# Patient Record
Sex: FEMALE | Race: WHITE | Employment: UNEMPLOYED | URBAN - METROPOLITAN AREA
[De-identification: names, ages, dates, MRNs, and addresses within clinical notes are randomized per-mention and may not be internally consistent; named-entity substitution may affect disease eponyms.]

---

## 2019-01-06 ENCOUNTER — HOSPITAL ENCOUNTER (EMERGENCY)
Age: 19
Discharge: HOME | End: 2019-01-06
Attending: EMERGENCY MEDICINE

## 2019-01-06 ENCOUNTER — APPOINTMENT (OUTPATIENT)
Dept: CT IMAGING | Age: 19
End: 2019-01-06

## 2019-01-06 VITALS
RESPIRATION RATE: 14 BRPM | BODY MASS INDEX: 32.25 KG/M2 | SYSTOLIC BLOOD PRESSURE: 96 MMHG | OXYGEN SATURATION: 100 % | DIASTOLIC BLOOD PRESSURE: 52 MMHG | HEIGHT: 59 IN | TEMPERATURE: 97 F | WEIGHT: 160 LBS | HEART RATE: 80 BPM

## 2019-01-06 DIAGNOSIS — R10.30 LOWER ABDOMINAL PAIN: Primary | ICD-10-CM

## 2019-01-06 LAB
A/G RATIO: 1.7 (ref 1.1–2.2)
ALBUMIN SERPL-MCNC: 4.4 G/DL (ref 3.4–5)
ALP BLD-CCNC: 66 U/L (ref 40–129)
ALT SERPL-CCNC: 18 U/L (ref 10–40)
ANION GAP SERPL CALCULATED.3IONS-SCNC: 8 MMOL/L (ref 3–16)
AST SERPL-CCNC: 16 U/L (ref 15–37)
BASOPHILS ABSOLUTE: 0.1 K/UL (ref 0–0.2)
BASOPHILS RELATIVE PERCENT: 0.7 %
BILIRUB SERPL-MCNC: 0.5 MG/DL (ref 0–1)
BILIRUBIN URINE: NEGATIVE
BLOOD, URINE: NEGATIVE
BUN BLDV-MCNC: 11 MG/DL (ref 7–20)
CALCIUM SERPL-MCNC: 9.1 MG/DL (ref 8.3–10.6)
CHLORIDE BLD-SCNC: 106 MMOL/L (ref 99–110)
CLARITY: CLEAR
CO2: 22 MMOL/L (ref 21–32)
COLOR: YELLOW
CREAT SERPL-MCNC: 0.6 MG/DL (ref 0.6–1.1)
EOSINOPHILS ABSOLUTE: 0.4 K/UL (ref 0–0.6)
EOSINOPHILS RELATIVE PERCENT: 5.3 %
GFR AFRICAN AMERICAN: >60
GFR NON-AFRICAN AMERICAN: >60
GLOBULIN: 2.6 G/DL
GLUCOSE BLD-MCNC: 97 MG/DL (ref 70–99)
GLUCOSE URINE: NEGATIVE MG/DL
HCG QUALITATIVE: NEGATIVE
HCT VFR BLD CALC: 45.1 % (ref 36–48)
HEMOGLOBIN: 15.6 G/DL (ref 12–16)
KETONES, URINE: NEGATIVE MG/DL
LEUKOCYTE ESTERASE, URINE: NEGATIVE
LYMPHOCYTES ABSOLUTE: 2.7 K/UL (ref 1–5.1)
LYMPHOCYTES RELATIVE PERCENT: 32.9 %
MCH RBC QN AUTO: 30.9 PG (ref 26–34)
MCHC RBC AUTO-ENTMCNC: 34.7 G/DL (ref 31–36)
MCV RBC AUTO: 88.8 FL (ref 80–100)
MICROSCOPIC EXAMINATION: NORMAL
MONOCYTES ABSOLUTE: 0.3 K/UL (ref 0–1.3)
MONOCYTES RELATIVE PERCENT: 4.3 %
NEUTROPHILS ABSOLUTE: 4.6 K/UL (ref 1.7–7.7)
NEUTROPHILS RELATIVE PERCENT: 56.8 %
NITRITE, URINE: NEGATIVE
PDW BLD-RTO: 12.1 % (ref 12.4–15.4)
PH UA: 6.5
PLATELET # BLD: 292 K/UL (ref 135–450)
PMV BLD AUTO: 7.1 FL (ref 5–10.5)
POTASSIUM REFLEX MAGNESIUM: 4.3 MMOL/L (ref 3.5–5.1)
PROTEIN UA: NEGATIVE MG/DL
RBC # BLD: 5.07 M/UL (ref 4–5.2)
SODIUM BLD-SCNC: 136 MMOL/L (ref 136–145)
SPECIFIC GRAVITY UA: 1.01
TOTAL PROTEIN: 7 G/DL (ref 6.4–8.2)
URINE REFLEX TO CULTURE: NORMAL
URINE TYPE: NORMAL
UROBILINOGEN, URINE: 0.2 E.U./DL
WBC # BLD: 8.1 K/UL (ref 4–11)

## 2019-01-06 PROCEDURE — 74177 CT ABD & PELVIS W/CONTRAST: CPT

## 2019-01-06 PROCEDURE — 6360000004 HC RX CONTRAST MEDICATION: Performed by: EMERGENCY MEDICINE

## 2019-01-06 PROCEDURE — 80053 COMPREHEN METABOLIC PANEL: CPT

## 2019-01-06 PROCEDURE — 96374 THER/PROPH/DIAG INJ IV PUSH: CPT

## 2019-01-06 PROCEDURE — 96375 TX/PRO/DX INJ NEW DRUG ADDON: CPT

## 2019-01-06 PROCEDURE — 81003 URINALYSIS AUTO W/O SCOPE: CPT

## 2019-01-06 PROCEDURE — 85025 COMPLETE CBC W/AUTO DIFF WBC: CPT

## 2019-01-06 PROCEDURE — 84703 CHORIONIC GONADOTROPIN ASSAY: CPT

## 2019-01-06 PROCEDURE — 99284 EMERGENCY DEPT VISIT MOD MDM: CPT

## 2019-01-06 PROCEDURE — 6360000002 HC RX W HCPCS: Performed by: EMERGENCY MEDICINE

## 2019-01-06 RX ORDER — ONDANSETRON 4 MG/1
4 TABLET, ORALLY DISINTEGRATING ORAL EVERY 8 HOURS PRN
Qty: 12 TABLET | Refills: 0 | Status: SHIPPED | OUTPATIENT
Start: 2019-01-06 | End: 2019-03-20

## 2019-01-06 RX ORDER — KETOROLAC TROMETHAMINE 30 MG/ML
30 INJECTION, SOLUTION INTRAMUSCULAR; INTRAVENOUS ONCE
Status: COMPLETED | OUTPATIENT
Start: 2019-01-06 | End: 2019-01-06

## 2019-01-06 RX ORDER — ONDANSETRON 2 MG/ML
4 INJECTION INTRAMUSCULAR; INTRAVENOUS
Status: DISCONTINUED | OUTPATIENT
Start: 2019-01-06 | End: 2019-01-06 | Stop reason: HOSPADM

## 2019-01-06 RX ADMIN — IOPAMIDOL 75 ML: 755 INJECTION, SOLUTION INTRAVENOUS at 13:16

## 2019-01-06 RX ADMIN — KETOROLAC TROMETHAMINE 30 MG: 30 INJECTION, SOLUTION INTRAMUSCULAR; INTRAVENOUS at 12:50

## 2019-01-06 RX ADMIN — ONDANSETRON 4 MG: 2 INJECTION INTRAMUSCULAR; INTRAVENOUS at 12:50

## 2019-01-06 ASSESSMENT — PAIN DESCRIPTION - DESCRIPTORS: DESCRIPTORS: ACHING

## 2019-01-06 ASSESSMENT — PAIN SCALES - GENERAL: PAINLEVEL_OUTOF10: 7

## 2019-01-06 ASSESSMENT — PAIN DESCRIPTION - LOCATION: LOCATION: ABDOMEN

## 2019-01-06 ASSESSMENT — PAIN DESCRIPTION - PAIN TYPE: TYPE: ACUTE PAIN

## 2019-03-15 ENCOUNTER — HOSPITAL ENCOUNTER (EMERGENCY)
Age: 19
Discharge: HOME | End: 2019-03-15
Attending: EMERGENCY MEDICINE

## 2019-03-15 VITALS
OXYGEN SATURATION: 98 % | SYSTOLIC BLOOD PRESSURE: 105 MMHG | BODY MASS INDEX: 34.88 KG/M2 | HEIGHT: 59 IN | DIASTOLIC BLOOD PRESSURE: 63 MMHG | RESPIRATION RATE: 16 BRPM | WEIGHT: 173 LBS | HEART RATE: 62 BPM | TEMPERATURE: 98.4 F

## 2019-03-15 DIAGNOSIS — K13.70 LESION OF ORAL MUCOSA: Primary | ICD-10-CM

## 2019-03-15 DIAGNOSIS — R59.1 LYMPHADENOPATHY: ICD-10-CM

## 2019-03-15 LAB
A/G RATIO: 1.1 (ref 1.1–2.2)
ALBUMIN SERPL-MCNC: 4.2 G/DL (ref 3.4–5)
ALP BLD-CCNC: 63 U/L (ref 40–129)
ALT SERPL-CCNC: 17 U/L (ref 10–40)
ANION GAP SERPL CALCULATED.3IONS-SCNC: 13 MMOL/L (ref 3–16)
AST SERPL-CCNC: 17 U/L (ref 15–37)
BASOPHILS ABSOLUTE: 0 K/UL (ref 0–0.2)
BASOPHILS RELATIVE PERCENT: 0.3 %
BILIRUB SERPL-MCNC: 1 MG/DL (ref 0–1)
BILIRUBIN URINE: NEGATIVE
BLOOD, URINE: NEGATIVE
BUN BLDV-MCNC: 9 MG/DL (ref 7–20)
CALCIUM SERPL-MCNC: 9.2 MG/DL (ref 8.3–10.6)
CHLORIDE BLD-SCNC: 96 MMOL/L (ref 99–110)
CLARITY: CLEAR
CO2: 23 MMOL/L (ref 21–32)
COLOR: YELLOW
CREAT SERPL-MCNC: 0.8 MG/DL (ref 0.6–1.1)
EOSINOPHILS ABSOLUTE: 0 K/UL (ref 0–0.6)
EOSINOPHILS RELATIVE PERCENT: 0.1 %
GFR AFRICAN AMERICAN: >60
GFR NON-AFRICAN AMERICAN: >60
GLOBULIN: 3.9 G/DL
GLUCOSE BLD-MCNC: 115 MG/DL (ref 70–99)
GLUCOSE URINE: NEGATIVE MG/DL
HCG QUALITATIVE: NEGATIVE
HCT VFR BLD CALC: 41 % (ref 36–48)
HEMOGLOBIN: 14.5 G/DL (ref 12–16)
KETONES, URINE: NEGATIVE MG/DL
LEUKOCYTE ESTERASE, URINE: NEGATIVE
LYMPHOCYTES ABSOLUTE: 1.2 K/UL (ref 1–5.1)
LYMPHOCYTES RELATIVE PERCENT: 8.9 %
MCH RBC QN AUTO: 30.6 PG (ref 26–34)
MCHC RBC AUTO-ENTMCNC: 35.4 G/DL (ref 31–36)
MCV RBC AUTO: 86.6 FL (ref 80–100)
MICROSCOPIC EXAMINATION: NORMAL
MONO TEST: NEGATIVE
MONOCYTES ABSOLUTE: 1.2 K/UL (ref 0–1.3)
MONOCYTES RELATIVE PERCENT: 9.1 %
NEUTROPHILS ABSOLUTE: 11 K/UL (ref 1.7–7.7)
NEUTROPHILS RELATIVE PERCENT: 81.6 %
NITRITE, URINE: NEGATIVE
PDW BLD-RTO: 11.7 % (ref 12.4–15.4)
PH UA: 5.5 (ref 5–8)
PLATELET # BLD: 227 K/UL (ref 135–450)
PMV BLD AUTO: 6.9 FL (ref 5–10.5)
POTASSIUM SERPL-SCNC: 3.8 MMOL/L (ref 3.5–5.1)
PROTEIN UA: NEGATIVE MG/DL
RBC # BLD: 4.73 M/UL (ref 4–5.2)
S PYO AG THROAT QL: NEGATIVE
SODIUM BLD-SCNC: 132 MMOL/L (ref 136–145)
SPECIFIC GRAVITY UA: 1.01 (ref 1–1.03)
TOTAL PROTEIN: 8.1 G/DL (ref 6.4–8.2)
URINE REFLEX TO CULTURE: NORMAL
URINE TYPE: NORMAL
UROBILINOGEN, URINE: 0.2 E.U./DL
WBC # BLD: 13.5 K/UL (ref 4–11)

## 2019-03-15 PROCEDURE — 2580000003 HC RX 258: Performed by: PHYSICIAN ASSISTANT

## 2019-03-15 PROCEDURE — 99283 EMERGENCY DEPT VISIT LOW MDM: CPT

## 2019-03-15 PROCEDURE — 85025 COMPLETE CBC W/AUTO DIFF WBC: CPT

## 2019-03-15 PROCEDURE — 6360000002 HC RX W HCPCS: Performed by: PHYSICIAN ASSISTANT

## 2019-03-15 PROCEDURE — 96374 THER/PROPH/DIAG INJ IV PUSH: CPT

## 2019-03-15 PROCEDURE — 80053 COMPREHEN METABOLIC PANEL: CPT

## 2019-03-15 PROCEDURE — 87081 CULTURE SCREEN ONLY: CPT

## 2019-03-15 PROCEDURE — 96375 TX/PRO/DX INJ NEW DRUG ADDON: CPT

## 2019-03-15 PROCEDURE — 81003 URINALYSIS AUTO W/O SCOPE: CPT

## 2019-03-15 PROCEDURE — 96361 HYDRATE IV INFUSION ADD-ON: CPT

## 2019-03-15 PROCEDURE — 87880 STREP A ASSAY W/OPTIC: CPT

## 2019-03-15 PROCEDURE — 86308 HETEROPHILE ANTIBODY SCREEN: CPT

## 2019-03-15 PROCEDURE — 84703 CHORIONIC GONADOTROPIN ASSAY: CPT

## 2019-03-15 RX ORDER — KETOROLAC TROMETHAMINE 30 MG/ML
30 INJECTION, SOLUTION INTRAMUSCULAR; INTRAVENOUS ONCE
Status: COMPLETED | OUTPATIENT
Start: 2019-03-15 | End: 2019-03-15

## 2019-03-15 RX ORDER — AMOXICILLIN AND CLAVULANATE POTASSIUM 875; 125 MG/1; MG/1
1 TABLET, FILM COATED ORAL 2 TIMES DAILY
Qty: 20 TABLET | Refills: 0 | Status: SHIPPED | OUTPATIENT
Start: 2019-03-15 | End: 2019-03-25

## 2019-03-15 RX ORDER — DEXAMETHASONE SODIUM PHOSPHATE 10 MG/ML
6 INJECTION INTRAMUSCULAR; INTRAVENOUS EVERY 6 HOURS
Status: DISCONTINUED | OUTPATIENT
Start: 2019-03-15 | End: 2019-03-15 | Stop reason: HOSPADM

## 2019-03-15 RX ORDER — 0.9 % SODIUM CHLORIDE 0.9 %
1000 INTRAVENOUS SOLUTION INTRAVENOUS ONCE
Status: COMPLETED | OUTPATIENT
Start: 2019-03-15 | End: 2019-03-15

## 2019-03-15 RX ADMIN — SODIUM CHLORIDE 1000 ML: 9 INJECTION, SOLUTION INTRAVENOUS at 14:31

## 2019-03-15 RX ADMIN — DEXAMETHASONE SODIUM PHOSPHATE 6 MG: 10 INJECTION INTRAMUSCULAR; INTRAVENOUS at 13:44

## 2019-03-15 RX ADMIN — KETOROLAC TROMETHAMINE 30 MG: 30 INJECTION, SOLUTION INTRAMUSCULAR; INTRAVENOUS at 14:31

## 2019-03-15 RX ADMIN — SODIUM CHLORIDE 1000 ML: 9 INJECTION, SOLUTION INTRAVENOUS at 13:44

## 2019-03-15 ASSESSMENT — ENCOUNTER SYMPTOMS
RESPIRATORY NEGATIVE: 1
GASTROINTESTINAL NEGATIVE: 1
EYES NEGATIVE: 1

## 2019-03-15 ASSESSMENT — PAIN SCALES - GENERAL
PAINLEVEL_OUTOF10: 8
PAINLEVEL_OUTOF10: 8
PAINLEVEL_OUTOF10: 6

## 2019-03-15 ASSESSMENT — PAIN DESCRIPTION - LOCATION
LOCATION: TEETH
LOCATION_2: THROAT

## 2019-03-15 ASSESSMENT — PAIN DESCRIPTION - ORIENTATION: ORIENTATION: LEFT

## 2019-03-15 ASSESSMENT — PAIN DESCRIPTION - INTENSITY: RATING_2: 10

## 2019-03-17 LAB — S PYO THROAT QL CULT: NORMAL

## 2019-03-20 ENCOUNTER — HOSPITAL ENCOUNTER (EMERGENCY)
Age: 19
Discharge: HOME | End: 2019-03-20

## 2019-03-20 VITALS
SYSTOLIC BLOOD PRESSURE: 106 MMHG | DIASTOLIC BLOOD PRESSURE: 78 MMHG | TEMPERATURE: 98.8 F | OXYGEN SATURATION: 98 % | BODY MASS INDEX: 29.84 KG/M2 | HEIGHT: 59 IN | RESPIRATION RATE: 18 BRPM | HEART RATE: 100 BPM | WEIGHT: 148 LBS

## 2019-03-20 DIAGNOSIS — J02.9 ACUTE PHARYNGITIS, UNSPECIFIED ETIOLOGY: Primary | ICD-10-CM

## 2019-03-20 LAB
AMORPHOUS: ABNORMAL /HPF
BACTERIA: ABNORMAL /HPF
BILIRUBIN URINE: ABNORMAL
BLOOD, URINE: ABNORMAL
CLARITY: ABNORMAL
COLOR: YELLOW
EPITHELIAL CELLS, UA: ABNORMAL /HPF
GLUCOSE URINE: NEGATIVE MG/DL
KETONES, URINE: 15 MG/DL
LEUKOCYTE ESTERASE, URINE: ABNORMAL
MICROSCOPIC EXAMINATION: YES
MUCUS: ABNORMAL /LPF
NITRITE, URINE: NEGATIVE
PH UA: 6 (ref 5–8)
PROTEIN UA: NEGATIVE MG/DL
RBC UA: ABNORMAL /HPF (ref 0–2)
S PYO AG THROAT QL: NEGATIVE
SPECIFIC GRAVITY UA: 1.02 (ref 1–1.03)
URINE TYPE: ABNORMAL
UROBILINOGEN, URINE: 0.2 E.U./DL
WBC UA: ABNORMAL /HPF (ref 0–5)

## 2019-03-20 PROCEDURE — 87081 CULTURE SCREEN ONLY: CPT

## 2019-03-20 PROCEDURE — 6370000000 HC RX 637 (ALT 250 FOR IP): Performed by: PHYSICIAN ASSISTANT

## 2019-03-20 PROCEDURE — 81001 URINALYSIS AUTO W/SCOPE: CPT

## 2019-03-20 PROCEDURE — 99284 EMERGENCY DEPT VISIT MOD MDM: CPT

## 2019-03-20 PROCEDURE — 87880 STREP A ASSAY W/OPTIC: CPT

## 2019-03-20 RX ORDER — IBUPROFEN 800 MG/1
800 TABLET ORAL EVERY 8 HOURS PRN
Qty: 20 TABLET | Refills: 0 | Status: SHIPPED | OUTPATIENT
Start: 2019-03-20 | End: 2019-03-28

## 2019-03-20 RX ORDER — IBUPROFEN 800 MG/1
800 TABLET ORAL ONCE
Status: COMPLETED | OUTPATIENT
Start: 2019-03-20 | End: 2019-03-20

## 2019-03-20 RX ORDER — FAMOTIDINE 20 MG/1
20 TABLET, FILM COATED ORAL 2 TIMES DAILY
Qty: 60 TABLET | Refills: 0 | Status: SHIPPED | OUTPATIENT
Start: 2019-03-20 | End: 2019-03-28

## 2019-03-20 RX ADMIN — IBUPROFEN 800 MG: 800 TABLET, FILM COATED ORAL at 09:11

## 2019-03-20 RX ADMIN — Medication 5 ML: at 09:13

## 2019-03-20 ASSESSMENT — PAIN DESCRIPTION - LOCATION: LOCATION: THROAT

## 2019-03-20 ASSESSMENT — PAIN SCALES - GENERAL
PAINLEVEL_OUTOF10: 6
PAINLEVEL_OUTOF10: 6

## 2019-03-20 ASSESSMENT — PAIN DESCRIPTION - DESCRIPTORS: DESCRIPTORS: ACHING;BURNING

## 2019-03-22 LAB — S PYO THROAT QL CULT: NORMAL

## 2019-03-28 ENCOUNTER — HOSPITAL ENCOUNTER (EMERGENCY)
Age: 19
Discharge: HOME | End: 2019-03-28
Attending: EMERGENCY MEDICINE

## 2019-03-28 VITALS
TEMPERATURE: 97.9 F | HEART RATE: 88 BPM | HEIGHT: 59 IN | DIASTOLIC BLOOD PRESSURE: 66 MMHG | SYSTOLIC BLOOD PRESSURE: 102 MMHG | OXYGEN SATURATION: 100 % | RESPIRATION RATE: 16 BRPM | BODY MASS INDEX: 29.23 KG/M2 | WEIGHT: 145 LBS

## 2019-03-28 DIAGNOSIS — R82.4 KETONURIA: ICD-10-CM

## 2019-03-28 DIAGNOSIS — N10 ACUTE PYELONEPHRITIS: ICD-10-CM

## 2019-03-28 DIAGNOSIS — R11.2 NAUSEA AND VOMITING, INTRACTABILITY OF VOMITING NOT SPECIFIED, UNSPECIFIED VOMITING TYPE: Primary | ICD-10-CM

## 2019-03-28 LAB
A/G RATIO: 1.2 (ref 1.1–2.2)
ALBUMIN SERPL-MCNC: 4.6 G/DL (ref 3.4–5)
ALP BLD-CCNC: 86 U/L (ref 40–129)
ALT SERPL-CCNC: 27 U/L (ref 10–40)
AMPHETAMINE SCREEN, URINE: ABNORMAL
ANION GAP SERPL CALCULATED.3IONS-SCNC: 14 MMOL/L (ref 3–16)
AST SERPL-CCNC: 22 U/L (ref 15–37)
BACTERIA: ABNORMAL /HPF
BARBITURATE SCREEN URINE: ABNORMAL
BASOPHILS ABSOLUTE: 0.1 K/UL (ref 0–0.2)
BASOPHILS RELATIVE PERCENT: 0.9 %
BENZODIAZEPINE SCREEN, URINE: ABNORMAL
BILIRUB SERPL-MCNC: 0.9 MG/DL (ref 0–1)
BILIRUBIN URINE: ABNORMAL
BLOOD, URINE: NEGATIVE
BUN BLDV-MCNC: 11 MG/DL (ref 7–20)
CALCIUM SERPL-MCNC: 9.9 MG/DL (ref 8.3–10.6)
CANNABINOID SCREEN URINE: POSITIVE
CHLORIDE BLD-SCNC: 102 MMOL/L (ref 99–110)
CLARITY: CLEAR
CO2: 24 MMOL/L (ref 21–32)
COCAINE METABOLITE SCREEN URINE: ABNORMAL
COLOR: YELLOW
CREAT SERPL-MCNC: 0.7 MG/DL (ref 0.6–1.1)
EOSINOPHILS ABSOLUTE: 0.2 K/UL (ref 0–0.6)
EOSINOPHILS RELATIVE PERCENT: 1.8 %
EPITHELIAL CELLS, UA: ABNORMAL /HPF
GFR AFRICAN AMERICAN: >60
GFR NON-AFRICAN AMERICAN: >60
GLOBULIN: 3.9 G/DL
GLUCOSE BLD-MCNC: 96 MG/DL (ref 70–99)
GLUCOSE URINE: NEGATIVE MG/DL
HCG(URINE) PREGNANCY TEST: NEGATIVE
HCT VFR BLD CALC: 44 % (ref 36–48)
HEMOGLOBIN: 15.1 G/DL (ref 12–16)
KETONES, URINE: ABNORMAL MG/DL
LEUKOCYTE ESTERASE, URINE: ABNORMAL
LIPASE: 21 U/L (ref 13–60)
LYMPHOCYTES ABSOLUTE: 2.2 K/UL (ref 1–5.1)
LYMPHOCYTES RELATIVE PERCENT: 22.4 %
Lab: ABNORMAL
MCH RBC QN AUTO: 30.1 PG (ref 26–34)
MCHC RBC AUTO-ENTMCNC: 34.3 G/DL (ref 31–36)
MCV RBC AUTO: 87.6 FL (ref 80–100)
METHADONE SCREEN, URINE: ABNORMAL
MICROSCOPIC EXAMINATION: YES
MONOCYTES ABSOLUTE: 0.7 K/UL (ref 0–1.3)
MONOCYTES RELATIVE PERCENT: 6.8 %
MUCUS: ABNORMAL /LPF
NEUTROPHILS ABSOLUTE: 6.6 K/UL (ref 1.7–7.7)
NEUTROPHILS RELATIVE PERCENT: 68.1 %
NITRITE, URINE: NEGATIVE
OPIATE SCREEN URINE: ABNORMAL
OXYCODONE URINE: ABNORMAL
PDW BLD-RTO: 11.9 % (ref 12.4–15.4)
PH UA: 5.5
PH UA: 5.5 (ref 5–8)
PHENCYCLIDINE SCREEN URINE: ABNORMAL
PLATELET # BLD: 373 K/UL (ref 135–450)
PMV BLD AUTO: 6.9 FL (ref 5–10.5)
POTASSIUM REFLEX MAGNESIUM: 4.1 MMOL/L (ref 3.5–5.1)
PROPOXYPHENE SCREEN: ABNORMAL
PROTEIN UA: ABNORMAL MG/DL
RAPID INFLUENZA  B AGN: NEGATIVE
RAPID INFLUENZA A AGN: NEGATIVE
RBC # BLD: 5.02 M/UL (ref 4–5.2)
RBC UA: ABNORMAL /HPF (ref 0–2)
SODIUM BLD-SCNC: 140 MMOL/L (ref 136–145)
SPECIFIC GRAVITY UA: 1.02 (ref 1–1.03)
TOTAL PROTEIN: 8.5 G/DL (ref 6.4–8.2)
URINE REFLEX TO CULTURE: YES
URINE TYPE: ABNORMAL
UROBILINOGEN, URINE: 0.2 E.U./DL
WBC # BLD: 9.7 K/UL (ref 4–11)
WBC UA: ABNORMAL /HPF (ref 0–5)

## 2019-03-28 PROCEDURE — 2580000003 HC RX 258: Performed by: PHYSICIAN ASSISTANT

## 2019-03-28 PROCEDURE — 84703 CHORIONIC GONADOTROPIN ASSAY: CPT

## 2019-03-28 PROCEDURE — 81001 URINALYSIS AUTO W/SCOPE: CPT

## 2019-03-28 PROCEDURE — 80053 COMPREHEN METABOLIC PANEL: CPT

## 2019-03-28 PROCEDURE — 85025 COMPLETE CBC W/AUTO DIFF WBC: CPT

## 2019-03-28 PROCEDURE — 6360000002 HC RX W HCPCS: Performed by: PHYSICIAN ASSISTANT

## 2019-03-28 PROCEDURE — 80307 DRUG TEST PRSMV CHEM ANLYZR: CPT

## 2019-03-28 PROCEDURE — 2500000003 HC RX 250 WO HCPCS: Performed by: PHYSICIAN ASSISTANT

## 2019-03-28 PROCEDURE — 99284 EMERGENCY DEPT VISIT MOD MDM: CPT

## 2019-03-28 PROCEDURE — 96374 THER/PROPH/DIAG INJ IV PUSH: CPT

## 2019-03-28 PROCEDURE — 83690 ASSAY OF LIPASE: CPT

## 2019-03-28 PROCEDURE — 6370000000 HC RX 637 (ALT 250 FOR IP): Performed by: EMERGENCY MEDICINE

## 2019-03-28 PROCEDURE — 96375 TX/PRO/DX INJ NEW DRUG ADDON: CPT

## 2019-03-28 PROCEDURE — 87804 INFLUENZA ASSAY W/OPTIC: CPT

## 2019-03-28 PROCEDURE — 96361 HYDRATE IV INFUSION ADD-ON: CPT

## 2019-03-28 PROCEDURE — 87086 URINE CULTURE/COLONY COUNT: CPT

## 2019-03-28 RX ORDER — 0.9 % SODIUM CHLORIDE 0.9 %
1000 INTRAVENOUS SOLUTION INTRAVENOUS ONCE
Status: COMPLETED | OUTPATIENT
Start: 2019-03-28 | End: 2019-03-28

## 2019-03-28 RX ORDER — CEPHALEXIN 500 MG/1
500 CAPSULE ORAL ONCE
Status: COMPLETED | OUTPATIENT
Start: 2019-03-28 | End: 2019-03-28

## 2019-03-28 RX ORDER — FAMOTIDINE 20 MG/1
20 TABLET, FILM COATED ORAL 2 TIMES DAILY
Qty: 60 TABLET | Refills: 0 | Status: SHIPPED | OUTPATIENT
Start: 2019-03-28 | End: 2019-07-10

## 2019-03-28 RX ORDER — ONDANSETRON 2 MG/ML
4 INJECTION INTRAMUSCULAR; INTRAVENOUS ONCE
Status: COMPLETED | OUTPATIENT
Start: 2019-03-28 | End: 2019-03-28

## 2019-03-28 RX ORDER — CEPHALEXIN 500 MG/1
500 CAPSULE ORAL 4 TIMES DAILY
Qty: 56 CAPSULE | Refills: 0 | Status: SHIPPED | OUTPATIENT
Start: 2019-03-28 | End: 2019-04-11

## 2019-03-28 RX ORDER — NAPROXEN 500 MG/1
500 TABLET ORAL 2 TIMES DAILY
Qty: 20 TABLET | Refills: 0 | Status: SHIPPED | OUTPATIENT
Start: 2019-03-28 | End: 2019-09-03 | Stop reason: ALTCHOICE

## 2019-03-28 RX ORDER — ONDANSETRON 4 MG/1
4 TABLET, ORALLY DISINTEGRATING ORAL EVERY 8 HOURS PRN
Qty: 20 TABLET | Refills: 0 | Status: SHIPPED | OUTPATIENT
Start: 2019-03-28 | End: 2019-07-10

## 2019-03-28 RX ORDER — KETOROLAC TROMETHAMINE 30 MG/ML
30 INJECTION, SOLUTION INTRAMUSCULAR; INTRAVENOUS ONCE
Status: COMPLETED | OUTPATIENT
Start: 2019-03-28 | End: 2019-03-28

## 2019-03-28 RX ADMIN — CEPHALEXIN 500 MG: 500 CAPSULE ORAL at 16:33

## 2019-03-28 RX ADMIN — KETOROLAC TROMETHAMINE 30 MG: 30 INJECTION, SOLUTION INTRAMUSCULAR; INTRAVENOUS at 14:09

## 2019-03-28 RX ADMIN — FAMOTIDINE 20 MG: 10 INJECTION, SOLUTION INTRAVENOUS at 14:10

## 2019-03-28 RX ADMIN — SODIUM CHLORIDE 1000 ML: 9 INJECTION, SOLUTION INTRAVENOUS at 15:19

## 2019-03-28 RX ADMIN — SODIUM CHLORIDE 1000 ML: 9 INJECTION, SOLUTION INTRAVENOUS at 14:07

## 2019-03-28 RX ADMIN — ONDANSETRON 4 MG: 2 INJECTION, SOLUTION INTRAMUSCULAR; INTRAVENOUS at 14:08

## 2019-03-28 ASSESSMENT — PAIN DESCRIPTION - LOCATION: LOCATION: ABDOMEN

## 2019-03-28 ASSESSMENT — PAIN SCALES - GENERAL
PAINLEVEL_OUTOF10: 6
PAINLEVEL_OUTOF10: 0

## 2019-03-28 ASSESSMENT — PAIN DESCRIPTION - PAIN TYPE: TYPE: ACUTE PAIN

## 2019-03-28 NOTE — ED PROVIDER NOTES
Department of Emergency Medicine   ED  Provider Note  Admit Date/Time: 3/28/2019  1:09 PM  ED Bed: 12/12  Chief Complaint:       Abdominal Pain (Abdominal pain, nausea, vomiting, diarrhea, hot flashes, dizziness x1 week. )    History of Present Illness   Source of history provided by:  patient. History/Exam Limitations: none. Isha Cat is a 19 y.o. old female who has a past medical history of:   Past Medical History:   Diagnosis Date    ADHD (attention deficit hyperactivity disorder)     Asthma     presents to the emergency department by private vehicle, for complaints of gradual onset aching, cramping pain in the entire abdomen without radiation which began 7 day(s) prior to arrival.   There has been similar episodes in the past.  Since onset the symptoms have been persistent and worsening. The pain is associated with diarrhea, vomiting and nothing pertinent. The pain is aggravated by activity and relieved by nothing. There has been NO CP, dysuria fevers or SOB. ROS   Pertinent positives and negatives are stated within HPI, all other systems reviewed and are negative. Past Surgical History:   Procedure Laterality Date    TUMOR REMOVAL  03/18/2019    left side of throat    Social History:  reports that she has been smoking cigarettes. She has been smoking about 0.25 packs per day. She has never used smokeless tobacco. She reports that she has current or past drug history. Drug: Marijuana. She reports that she does not drink alcohol. Family History: family history is not on file. Allergies: Patient has no known allergies. Physical Exam            ED Triage Vitals [03/28/19 1306]   BP Temp Temp Source Pulse Resp SpO2 Height Weight   111/78 97.9 °F (36.6 °C) Oral 98 15 98 % 4' 11\" (1.499 m) 145 lb (65.8 kg)      Oxygen Saturation Interpretation: Normal.    · General Appearance/Constitutional:  Alert, development consistent with age  · HEENT:  NC/NT. PERRLA.   Airway patent. · Neck:  Supple. No lymphadenopathy. · Respiratory:  No retractions. Lungs Clear to auscultation and breath sounds equal.  · CV:  Regular rate and rhythm. · GI:  General Appearance: normal.         Bowel sounds: normal bowel sounds. Distension:  None. Tenderness: No abdominal tenderness. Liver: non-tender. Spleen:  non-tender. Pulsatile Mass: absent. Hernia:  no inguinal or femoral hernias noted. · Back: CVA Tenderness: No.  · Integument:  Normal turgor. Warm, dry, without visible rash, unless noted elsewhere. · Lymphatics: No edema, cap.refill <3sec. · Neurological:  Orientation age-appropriate. Motor functions intact. Lab / Imaging Results   (All laboratory and radiology results have been personally reviewed by myself)  Labs:  Results for orders placed or performed during the hospital encounter of 03/28/19   Urine Culture   Result Value Ref Range    Urine Culture, Routine       <10,000 CFU/ml mixed skin/urogenital rebeca.  No further workup   Rapid influenza A/B antigens   Result Value Ref Range    Rapid Influenza A Ag Negative Negative    Rapid Influenza B Ag Negative Negative   Urine, reflex to culture   Result Value Ref Range    Color, UA Yellow Straw/Yellow    Clarity, UA Clear Clear    Glucose, Ur Negative Negative mg/dL    Bilirubin Urine SMALL (A) Negative    Ketones, Urine TRACE (A) Negative mg/dL    Specific Gravity, UA 1.025 1.005 - 1.030    Blood, Urine Negative Negative    pH, UA 5.5 5.0 - 8.0    Protein, UA TRACE (A) Negative mg/dL    Urobilinogen, Urine 0.2 <2.0 E.U./dL    Nitrite, Urine Negative Negative    Leukocyte Esterase, Urine TRACE (A) Negative    Microscopic Examination YES     Urine Reflex to Culture Yes     Urine Type Not Specified    Pregnancy, Urine   Result Value Ref Range    HCG(Urine) Pregnancy Test Negative Detects HCG level >20 MIU/mL   CBC Auto Differential   Result Value Ref Range    WBC 9.7 4.0 - 11.0 K/uL    RBC 5.02 4.00 - 5.20 M/uL    Hemoglobin 15.1 12.0 - 16.0 g/dL    Hematocrit 44.0 36.0 - 48.0 %    MCV 87.6 80.0 - 100.0 fL    MCH 30.1 26.0 - 34.0 pg    MCHC 34.3 31.0 - 36.0 g/dL    RDW 11.9 (L) 12.4 - 15.4 %    Platelets 624 477 - 331 K/uL    MPV 6.9 5.0 - 10.5 fL    Neutrophils % 68.1 %    Lymphocytes % 22.4 %    Monocytes % 6.8 %    Eosinophils % 1.8 %    Basophils % 0.9 %    Neutrophils # 6.6 1.7 - 7.7 K/uL    Lymphocytes # 2.2 1.0 - 5.1 K/uL    Monocytes # 0.7 0.0 - 1.3 K/uL    Eosinophils # 0.2 0.0 - 0.6 K/uL    Basophils # 0.1 0.0 - 0.2 K/uL   Comprehensive Metabolic Panel w/ Reflex to MG   Result Value Ref Range    Sodium 140 136 - 145 mmol/L    Potassium reflex Magnesium 4.1 3.5 - 5.1 mmol/L    Chloride 102 99 - 110 mmol/L    CO2 24 21 - 32 mmol/L    Anion Gap 14 3 - 16    Glucose 96 70 - 99 mg/dL    BUN 11 7 - 20 mg/dL    CREATININE 0.7 0.6 - 1.1 mg/dL    GFR Non-African American >60 >60    GFR African American >60 >60    Calcium 9.9 8.3 - 10.6 mg/dL    Total Protein 8.5 (H) 6.4 - 8.2 g/dL    Alb 4.6 3.4 - 5.0 g/dL    Albumin/Globulin Ratio 1.2 1.1 - 2.2    Total Bilirubin 0.9 0.0 - 1.0 mg/dL    Alkaline Phosphatase 86 40 - 129 U/L    ALT 27 10 - 40 U/L    AST 22 15 - 37 U/L    Globulin 3.9 g/dL   Lipase   Result Value Ref Range    Lipase 21.0 13.0 - 60.0 U/L   Drug screen multi urine   Result Value Ref Range    Amphetamine Screen, Urine Neg Negative <1000ng/mL    Barbiturate Screen, Ur Neg Negative <200 ng/mL    Benzodiazepine Screen, Urine Neg Negative <200 ng/mL    Cannabinoid Scrn, Ur POSITIVE (A) Negative <50 ng/mL    Cocaine Metabolite Screen, Urine Neg Negative <300 ng/mL    Opiate Scrn, Ur Neg Negative <300 ng/mL    PCP Screen, Urine Neg Negative <25 ng/mL    Methadone Screen, Urine Neg Negative <300 ng/mL    Propoxyphene Scrn, Ur Neg Negative <300 ng/mL    pH, UA 5.5     Drug Screen Comment: see below     Oxycodone Urine Neg Negative <100 ng/ml   Microscopic Urinalysis   Result Value Ref Range    Mucus, UA 3+ (A) /LPF    WBC, UA 20-50 (A) 0 - 5 /HPF    RBC, UA 10-20 (A) 0 - 2 /HPF    Epi Cells 5-10 /HPF    Bacteria, UA 2+ (A) /HPF     Imaging: All Radiology results interpreted by Radiologist unless otherwise noted. No orders to display       ED Course / Medical Decision Making     Medications   famotidine (PEPCID) injection 20 mg (20 mg Intravenous Given 3/28/19 1410)   ondansetron (ZOFRAN) injection 4 mg (4 mg Intravenous Given 3/28/19 1408)   ketorolac (TORADOL) injection 30 mg (30 mg Intravenous Given 3/28/19 1409)   0.9 % sodium chloride bolus (0 mLs Intravenous Stopped 3/28/19 1519)   0.9 % sodium chloride bolus (0 mLs Intravenous Stopped 3/28/19 1652)   cephALEXin (KEFLEX) capsule 500 mg (500 mg Oral Given 3/28/19 1633)     ED Course as of Apr 01 0108   Thu Mar 28, 2019   1558 Patient is provided with IV fluids Pepcid Toradol and Zofran. She has no signs of leukocytosis. Negative influenza no anemia. No electrolyte abnormality normal kidney and liver function elevation in lipase.    [AR]   1559 Patient indicates her symptoms are improving due to ketonuria we will administer one additional bag of fluids and then we will discharge in stable condition.    [AR]   1559 Patient's blood pressures are mildly hypertensive for the duration of the time she is here. I did review past visits and this is consistent with patients baseline as patient states it is.     [AR]   1624 Rapid influenza A/B antigens:    Rapid Influenza A Ag Negative   Rapid Influenza B Ag Negative [WL]   1624 Lipase:    Lipase 21.0 [WL]   1624 Comprehensive Metabolic Panel w/ Reflex to MG(!):    Sodium 140   Potassium 4.1   Chloride 102   CO2 24   Anion Gap 14   Glucose 96   BUN 11   Creatinine 0.7   GFR Non- >60   GFR African American >60   Calcium 9.9   Total Protein 8.5(!)   Albumin 4.6   Albumin/Globulin Ratio 1.2   Bilirubin 0.9   Alk Phos 86   ALT 27   AST 22   Globulin 3.9 [WL]   1624 CBC Auto Differential(!):    WBC 9.7   RBC 5.02   Hemoglobin Quant 15.1   Hematocrit 44.0   MCV 87.6   MCH 30.1   MCHC 34.3   RDW 11.9(!)   Platelet Count 640   MPV 6.9   Neutrophils % 68.1   Lymphocyte % 22.4   Monocytes % 6.8   Eosinophils % 1.8   Basophils % 0.9   Neutrophils # 6.6   Lymphocytes # 2.2   Monocytes # 0.7   Eosinophils # 0.2   Basophils # 0.1 [WL]   1624 Drug screen multi urine(!):    Amphetamine Screen, Urine Neg   Barbiturate Screen, Ur Neg   Benzodiazepine Screen, Urine Neg   Cannabinoid Scrn, Ur POSITIVE(!)   Cocaine Metabolite Screen, Urine Neg   Opiate Scrn, Ur Neg   PCP Screen, Urine Neg   Methadone Screen, Urine Neg   Propoxyphene Scrn, Ur Neg   pH, UA 5.5   Drug Screen Comment: see below   Oxycodone Urine Neg [WL]   1624 Microscopic Urinalysis(!):    Mucus, UA 3+(!)   WBC, UA 20-50(!)   RBC, UA 10-20(!)   Epi Cells 5-10   Bacteria, UA 2+(!) [WL]   1624 Urine, reflex to culture(!):    Color, UA Yellow   Clarity, UA Clear   Glucose, UA Negative   Bilirubin, Urine SMALL(!)   Ketones, Urine TRACE(!)   Specific Gravity, UA 1.025   Blood, Urine Negative   pH, UA 5.5   Protein, UA TRACE(!)   Urobilinogen, Urine 0.2   Nitrite, Urine Negative   Leukocyte Esterase, Urine TRACE(!)   Microscopic Examination YES   Urine Reflex to Culture Yes   Urine Type Not Specified [WL]   5492 Pregnancy, Urine:    HCG(Urine) Pregnancy Test Negative [WL]   1627 I have personally performed and/or participated in the history, exam and medical decision making and agree with all pertinent clinical information. I have also reviewed and agree with the past medical, family and social history unless otherwise noted. Patient presents for evaluation of generalized abdominal discomfort and intractable nausea and vomiting this morning. She has not had fever chills chest pain or shortness of breath. She has been having some GI symptoms past few days. She has not had any back or flank pain. He has not had significant dysuria.     On my evaluation patient is nontoxic in no acute distress after medications. Abdomen is soft and nontender with no guarding or rebound. She has no CVA tenderness. Patient with nonseptic pyelonephritis, is well-appearing. She'll be discharged with p.o. antibiotics and nausea control    [WL]      ED Course User Index  [AR] Ralph Castillo PA-C  [WL] Ramiro Haley,      Re-Evaluations:  3/28/19      Time: 1430    Patients symptoms are improving. MDM:  Afebrile, stable, patient presents to the ED for evaluation. Seen on my own, per my scope of practice, with attending ED provider available for consultation who agrees with assessment and plan. Afebrile stable patient presents the ED for evaluation of her vitals are within normal limits SPO2 on room air. He percent patient is not hypoxic. Patient's abdomen is nontender upon palpation. She is provided with Pepcid Zofran and Toradol and IV fluids which helped her symptoms improved. Labs and urinalysis are ordered and evaluated. Patient's urine shows ketonuria and therefore she is given 2 L of IV fluids. There is no indication for CT scanning is patient's abdomen is soft and nontender she is not guarding. Symptoms have been ongoing for the past 7 days. Patient is strongly encouraged to follow-up PCP she is given a referral for one 2 discharged home with some treatment and low suspicion for return. All questions are answered. Indications for return to the ED are discussed. Patient is advised if any new or worsening symptoms arise they should immediately return to the emergency room. Follow-up with primary care in 1-2 days. Counseling:   I have spoken with the patient and discussed todays results, in addition to providing specific details for the plan of care and counseling regarding the diagnosis and prognosis and are agreeable with the plan. Assessment      1.  Nausea and vomiting, intractability of vomiting not specified, unspecified vomiting type    2. Ketonuria    3. Acute pyelonephritis      This patient's ED course included: a personal history and physicial examination  This patient has remained hemodynamically stable during their ED course. Plan   Discharge to home. Patient condition is stable. New Medications     Discharge Medication List as of 3/28/2019  4:26 PM      START taking these medications    Details   ondansetron (ZOFRAN ODT) 4 MG disintegrating tablet Take 1 tablet by mouth every 8 hours as needed for Nausea, Disp-20 tablet, R-0Print      cephALEXin (KEFLEX) 500 MG capsule Take 1 capsule by mouth 4 times daily for 14 days, Disp-56 capsule, R-0Print           Electronically signed by Alan Macias PA-C   DD: 3/28/19  **This report was transcribed using voice recognition software. Every effort was made to ensure accuracy; however, inadvertent computerized transcription errors may be present.   END OF PROVIDER NOTE      Alan Macias PA-C  04/01/19 [id number]

## 2019-03-29 LAB — URINE CULTURE, ROUTINE: NORMAL

## 2019-03-29 NOTE — ED PROVIDER NOTES
ED Course as of Mar 28 2324   Thu Mar 28, 2019   1558 Patient is provided with IV fluids Pepcid Toradol and Zofran. She has no signs of leukocytosis. Negative influenza no anemia. No electrolyte abnormality normal kidney and liver function elevation in lipase.    [AR]   1559 Patient indicates her symptoms are improving due to ketonuria we will administer one additional bag of fluids and then we will discharge in stable condition.    [AR]   1559 Patient's blood pressures are mildly hypertensive for the duration of the time she is here. I did review past visits and this is consistent with patients baseline as patient states it is.     [AR]   1986 Rapid influenza A/B antigens:    Rapid Influenza A Ag Negative   Rapid Influenza B Ag Negative [WL]   1624 Lipase:    Lipase 21.0 [WL]   1624 Comprehensive Metabolic Panel w/ Reflex to MG(!):    Sodium 140   Potassium 4.1   Chloride 102   CO2 24   Anion Gap 14   Glucose 96   BUN 11   Creatinine 0.7   GFR Non- >60   GFR African American >60   Calcium 9.9   Total Protein 8.5(!)   Albumin 4.6   Albumin/Globulin Ratio 1.2   Bilirubin 0.9   Alk Phos 86   ALT 27   AST 22   Globulin 3.9 [WL]   1624 CBC Auto Differential(!):    WBC 9.7   RBC 5.02   Hemoglobin Quant 15.1   Hematocrit 44.0   MCV 87.6   MCH 30.1   MCHC 34.3   RDW 11.9(!)   Platelet Count 074   MPV 6.9   Neutrophils % 68.1   Lymphocyte % 22.4   Monocytes % 6.8   Eosinophils % 1.8   Basophils % 0.9   Neutrophils # 6.6   Lymphocytes # 2.2   Monocytes # 0.7   Eosinophils # 0.2   Basophils # 0.1 [WL]   1624 Drug screen multi urine(!):    Amphetamine Screen, Urine Neg   Barbiturate Screen, Ur Neg   Benzodiazepine Screen, Urine Neg   Cannabinoid Scrn, Ur POSITIVE(!)   Cocaine Metabolite Screen, Urine Neg   Opiate Scrn, Ur Neg   PCP Screen, Urine Neg   Methadone Screen, Urine Neg   Propoxyphene Scrn, Ur Neg   pH, UA 5.5   Drug Screen Comment: see below   Oxycodone Urine Neg [WL]   1624 Microscopic Urinalysis(!):    Mucus, UA 3+(!)   WBC, UA 20-50(!)   RBC, UA 10-20(!)   Epi Cells 5-10   Bacteria, UA 2+(!) [WL]   1624 Urine, reflex to culture(!):    Color, UA Yellow   Clarity, UA Clear   Glucose, UA Negative   Bilirubin, Urine SMALL(!)   Ketones, Urine TRACE(!)   Specific Gravity, UA 1.025   Blood, Urine Negative   pH, UA 5.5   Protein, UA TRACE(!)   Urobilinogen, Urine 0.2   Nitrite, Urine Negative   Leukocyte Esterase, Urine TRACE(!)   Microscopic Examination YES   Urine Reflex to Culture Yes   Urine Type Not Specified [WL]   7677 Pregnancy, Urine:    HCG(Urine) Pregnancy Test Negative [WL]   1627 I have personally performed and/or participated in the history, exam and medical decision making and agree with all pertinent clinical information. I have also reviewed and agree with the past medical, family and social history unless otherwise noted. Patient presents for evaluation of generalized abdominal discomfort and intractable nausea and vomiting this morning. She has not had fever chills chest pain or shortness of breath. She has been having some GI symptoms past few days. She has not had any back or flank pain. He has not had significant dysuria. On my evaluation patient is nontoxic in no acute distress after medications. Abdomen is soft and nontender with no guarding or rebound. She has no CVA tenderness. Patient with nonseptic pyelonephritis, is well-appearing.   She'll be discharged with p.o. antibiotics and nausea control    [WL]      ED Course User Index  [AR] Nubia Ferreira PA-C  [WL] DO Lianet Carrizales DO  03/28/19 5093

## 2019-03-31 ENCOUNTER — APPOINTMENT (OUTPATIENT)
Dept: CT IMAGING | Age: 19
End: 2019-03-31

## 2019-03-31 ENCOUNTER — HOSPITAL ENCOUNTER (EMERGENCY)
Age: 19
Discharge: HOME | End: 2019-03-31
Attending: EMERGENCY MEDICINE

## 2019-03-31 VITALS
WEIGHT: 130 LBS | SYSTOLIC BLOOD PRESSURE: 94 MMHG | RESPIRATION RATE: 16 BRPM | BODY MASS INDEX: 26.21 KG/M2 | HEART RATE: 81 BPM | DIASTOLIC BLOOD PRESSURE: 66 MMHG | HEIGHT: 59 IN | TEMPERATURE: 97.8 F | OXYGEN SATURATION: 100 %

## 2019-03-31 DIAGNOSIS — R10.9 RIGHT SIDED ABDOMINAL PAIN: Primary | ICD-10-CM

## 2019-03-31 LAB
A/G RATIO: 1.2 (ref 1.1–2.2)
ALBUMIN SERPL-MCNC: 4.3 G/DL (ref 3.4–5)
ALP BLD-CCNC: 90 U/L (ref 40–129)
ALT SERPL-CCNC: 23 U/L (ref 10–40)
ANION GAP SERPL CALCULATED.3IONS-SCNC: 13 MMOL/L (ref 3–16)
AST SERPL-CCNC: 13 U/L (ref 15–37)
BACTERIA: ABNORMAL /HPF
BASOPHILS ABSOLUTE: 0.1 K/UL (ref 0–0.2)
BASOPHILS RELATIVE PERCENT: 0.7 %
BILIRUB SERPL-MCNC: 1.4 MG/DL (ref 0–1)
BILIRUBIN URINE: ABNORMAL
BLOOD, URINE: ABNORMAL
BUN BLDV-MCNC: 7 MG/DL (ref 7–20)
CALCIUM SERPL-MCNC: 9.2 MG/DL (ref 8.3–10.6)
CHLORIDE BLD-SCNC: 104 MMOL/L (ref 99–110)
CLARITY: ABNORMAL
CO2: 23 MMOL/L (ref 21–32)
COLOR: YELLOW
CREAT SERPL-MCNC: 0.7 MG/DL (ref 0.6–1.1)
EOSINOPHILS ABSOLUTE: 0.1 K/UL (ref 0–0.6)
EOSINOPHILS RELATIVE PERCENT: 0.9 %
EPITHELIAL CELLS, UA: ABNORMAL /HPF
GFR AFRICAN AMERICAN: >60
GFR NON-AFRICAN AMERICAN: >60
GLOBULIN: 3.5 G/DL
GLUCOSE BLD-MCNC: 93 MG/DL (ref 70–99)
GLUCOSE URINE: NEGATIVE MG/DL
HCG QUALITATIVE: NEGATIVE
HCT VFR BLD CALC: 41.3 % (ref 36–48)
HEMOGLOBIN: 14.3 G/DL (ref 12–16)
KETONES, URINE: 15 MG/DL
LEUKOCYTE ESTERASE, URINE: ABNORMAL
LIPASE: 17 U/L (ref 13–60)
LYMPHOCYTES ABSOLUTE: 1.3 K/UL (ref 1–5.1)
LYMPHOCYTES RELATIVE PERCENT: 11.2 %
MCH RBC QN AUTO: 30.1 PG (ref 26–34)
MCHC RBC AUTO-ENTMCNC: 34.6 G/DL (ref 31–36)
MCV RBC AUTO: 87 FL (ref 80–100)
MICROSCOPIC EXAMINATION: YES
MONOCYTES ABSOLUTE: 0.6 K/UL (ref 0–1.3)
MONOCYTES RELATIVE PERCENT: 5.1 %
MUCUS: ABNORMAL /LPF
NEUTROPHILS ABSOLUTE: 9.8 K/UL (ref 1.7–7.7)
NEUTROPHILS RELATIVE PERCENT: 82.1 %
NITRITE, URINE: NEGATIVE
PDW BLD-RTO: 12.3 % (ref 12.4–15.4)
PH UA: 5.5 (ref 5–8)
PLATELET # BLD: 303 K/UL (ref 135–450)
PMV BLD AUTO: 7 FL (ref 5–10.5)
POTASSIUM REFLEX MAGNESIUM: 4 MMOL/L (ref 3.5–5.1)
PROTEIN UA: ABNORMAL MG/DL
RBC # BLD: 4.74 M/UL (ref 4–5.2)
RBC UA: ABNORMAL /HPF (ref 0–2)
SODIUM BLD-SCNC: 140 MMOL/L (ref 136–145)
SPECIFIC GRAVITY UA: >=1.03 (ref 1–1.03)
TOTAL PROTEIN: 7.8 G/DL (ref 6.4–8.2)
URINE REFLEX TO CULTURE: YES
URINE TYPE: ABNORMAL
UROBILINOGEN, URINE: 0.2 E.U./DL
WBC # BLD: 11.9 K/UL (ref 4–11)
WBC UA: ABNORMAL /HPF (ref 0–5)

## 2019-03-31 PROCEDURE — 83690 ASSAY OF LIPASE: CPT

## 2019-03-31 PROCEDURE — 85025 COMPLETE CBC W/AUTO DIFF WBC: CPT

## 2019-03-31 PROCEDURE — 81001 URINALYSIS AUTO W/SCOPE: CPT

## 2019-03-31 PROCEDURE — 96361 HYDRATE IV INFUSION ADD-ON: CPT

## 2019-03-31 PROCEDURE — 6370000000 HC RX 637 (ALT 250 FOR IP): Performed by: PHYSICIAN ASSISTANT

## 2019-03-31 PROCEDURE — 87086 URINE CULTURE/COLONY COUNT: CPT

## 2019-03-31 PROCEDURE — 6360000004 HC RX CONTRAST MEDICATION: Performed by: EMERGENCY MEDICINE

## 2019-03-31 PROCEDURE — 80053 COMPREHEN METABOLIC PANEL: CPT

## 2019-03-31 PROCEDURE — 99284 EMERGENCY DEPT VISIT MOD MDM: CPT

## 2019-03-31 PROCEDURE — 84703 CHORIONIC GONADOTROPIN ASSAY: CPT

## 2019-03-31 PROCEDURE — 2580000003 HC RX 258: Performed by: PHYSICIAN ASSISTANT

## 2019-03-31 PROCEDURE — 96374 THER/PROPH/DIAG INJ IV PUSH: CPT

## 2019-03-31 PROCEDURE — 74177 CT ABD & PELVIS W/CONTRAST: CPT

## 2019-03-31 PROCEDURE — 6360000002 HC RX W HCPCS: Performed by: PHYSICIAN ASSISTANT

## 2019-03-31 PROCEDURE — 96375 TX/PRO/DX INJ NEW DRUG ADDON: CPT

## 2019-03-31 RX ORDER — ONDANSETRON 4 MG/1
4 TABLET, FILM COATED ORAL EVERY 8 HOURS PRN
Qty: 20 TABLET | Refills: 0 | Status: SHIPPED | OUTPATIENT
Start: 2019-03-31 | End: 2019-07-10

## 2019-03-31 RX ORDER — 0.9 % SODIUM CHLORIDE 0.9 %
1000 INTRAVENOUS SOLUTION INTRAVENOUS ONCE
Status: COMPLETED | OUTPATIENT
Start: 2019-03-31 | End: 2019-03-31

## 2019-03-31 RX ORDER — ONDANSETRON 2 MG/ML
4 INJECTION INTRAMUSCULAR; INTRAVENOUS EVERY 6 HOURS PRN
Status: DISCONTINUED | OUTPATIENT
Start: 2019-03-31 | End: 2019-03-31 | Stop reason: HOSPADM

## 2019-03-31 RX ORDER — KETOROLAC TROMETHAMINE 30 MG/ML
15 INJECTION, SOLUTION INTRAMUSCULAR; INTRAVENOUS ONCE
Status: COMPLETED | OUTPATIENT
Start: 2019-03-31 | End: 2019-03-31

## 2019-03-31 RX ORDER — CYCLOBENZAPRINE HCL 10 MG
10 TABLET ORAL 3 TIMES DAILY PRN
Qty: 20 TABLET | Refills: 0 | Status: SHIPPED | OUTPATIENT
Start: 2019-03-31 | End: 2019-04-07

## 2019-03-31 RX ORDER — CYCLOBENZAPRINE HCL 10 MG
10 TABLET ORAL ONCE
Status: COMPLETED | OUTPATIENT
Start: 2019-03-31 | End: 2019-03-31

## 2019-03-31 RX ADMIN — KETOROLAC TROMETHAMINE 15 MG: 30 INJECTION, SOLUTION INTRAMUSCULAR at 15:17

## 2019-03-31 RX ADMIN — ONDANSETRON 4 MG: 2 INJECTION, SOLUTION INTRAMUSCULAR; INTRAVENOUS at 15:17

## 2019-03-31 RX ADMIN — SODIUM CHLORIDE 1000 ML: 9 INJECTION, SOLUTION INTRAVENOUS at 15:17

## 2019-03-31 RX ADMIN — IOPAMIDOL 75 ML: 755 INJECTION, SOLUTION INTRAVENOUS at 15:29

## 2019-03-31 RX ADMIN — CYCLOBENZAPRINE HYDROCHLORIDE 10 MG: 10 TABLET, FILM COATED ORAL at 16:30

## 2019-03-31 ASSESSMENT — PAIN DESCRIPTION - PAIN TYPE
TYPE: ACUTE PAIN
TYPE: ACUTE PAIN

## 2019-03-31 ASSESSMENT — PAIN SCALES - GENERAL
PAINLEVEL_OUTOF10: 6
PAINLEVEL_OUTOF10: 8
PAINLEVEL_OUTOF10: 5

## 2019-03-31 ASSESSMENT — PAIN DESCRIPTION - ORIENTATION: ORIENTATION: RIGHT;LOWER

## 2019-03-31 ASSESSMENT — ENCOUNTER SYMPTOMS
RESPIRATORY NEGATIVE: 1
ABDOMINAL PAIN: 1
NAUSEA: 1
VOMITING: 1

## 2019-03-31 ASSESSMENT — PAIN DESCRIPTION - LOCATION
LOCATION: ABDOMEN
LOCATION: ABDOMEN

## 2019-03-31 ASSESSMENT — PAIN DESCRIPTION - DESCRIPTORS: DESCRIPTORS: ACHING

## 2019-03-31 NOTE — ED PROVIDER NOTES
I independently examined and evaluated Tanya Toro. In brief, patient presenting for evaluation pain localizing to the right lower quadrant is more of a sharp pain that is worse anytime she moves. She is recently diagnosed with a UTI and possible pyelonephritis and started on Keflex. She is been taking that in addition to Zofran and Naprosyn for symptom control but she states that the Naprosyn only helps with the pain for about an hour and then gets worse. .    Focused exam revealed patient is overall well-appearing and in no acute distress. Heart is regular without murmur. Lungs are clear to auscultation. Abdomen is soft, focally tender in the right lower quadrant without any noted guarding or rebound. No abdominal distention. Imaging:  Ct Abdomen Pelvis W Iv Contrast Additional Contrast? None    Result Date: 3/31/2019  EXAMINATION: CT OF THE ABDOMEN AND PELVIS WITH CONTRAST 3/31/2019 3:27 pm TECHNIQUE: CT of the abdomen and pelvis was performed with the administration of intravenous contrast. Multiplanar reformatted images are provided for review. Dose modulation, iterative reconstruction, and/or weight based adjustment of the mA/kV was utilized to reduce the radiation dose to as low as reasonably achievable. COMPARISON: January 6, 2019 HISTORY: ORDERING SYSTEM PROVIDED HISTORY: cva right sided tenderness/ RLQ pain TECHNOLOGIST PROVIDED HISTORY: If patient is on cardiac monitor and/or pulse ox, they may be taken off cardiac monitor and pulse ox, left on O2 if currently on. All monitors reattached when patient returns to room. Additional Contrast?->None Ordering Physician Provided Reason for Exam: lower right side,  swollen, pain with movement. diarrhea Acuity: Acute Type of Exam: Initial Relevant Medical/Surgical History: no prior abd sx FINDINGS: Lower Chest: Lower mediastinal structures are unremarkable. Lung bases are clear.  Organs: No acute abnormality is identified involving the liver, gallbladder, spleen, pancreas, adrenal glands or kidneys. GI/Bowel: The small bowel is normal in caliber. No evidence of bowel obstruction. No focal inflammatory changes are present involving the GI tract. The appendix is normal. Pelvis: The urinary bladder is decompressed and not optimally evaluated. No stones are seen in the bladder or distal ureters. The pelvic organs appear age-appropriate. No free fluid in the pelvis. No pelvic lymphadenopathy. Peritoneum/Retroperitoneum: The abdominal aorta is normal in caliber. No intraperitoneal free air or free fluid is identified. Bones/Soft Tissues: No acute osseous abnormality     1. No acute findings within the abdomen. Normal appendix. 2. No acute findings within the pelvis. ED course: Patient presents for evaluation of abdominal pain is worse with movement. Possible muscular skeletal component. No evidence of acute appendicitis on CT scan. Her urine does still show white blood cells but also shows significant epithelial cells. Upon review of her urine culture from her visit on the 28th this did not grow any significant organism. We'll send urine for culture again today and have her continue the Keflex as previously prescribed but we'll trial Flexeril in addition to the NSAIDs for pain control. Patient was in agreement of plan for discharge home with close patient follow-up. Reasons to return to the ER were discussed and all questions answered at time of discharge. All diagnostic, treatment, and disposition decisions were made by myself in conjunction with the advanced practice provider. For all further details of the patient's emergency department visit, please see the advanced practice provider's documentation.     Comment: Please note this report has been produced using speech recognition software and may contain errors related to that system including errors in grammar, punctuation, and spelling, as well as words and phrases that may be inappropriate. If there are any questions or concerns please feel free to contact the dictating provider for clarification.        Madison Gamez MD  03/31/19 3035

## 2019-03-31 NOTE — ED NOTES
Pt states she was dx with kidney infection and started on antibiotics. Pt states she woke up this morning with lower abd pain.       Emmanuel Burden RN  03/31/19 [id number]

## 2019-03-31 NOTE — ED NOTES
Freddy Rice, [facility] with pt blood pressure of 94/66 at discharge.       Alex Lion RN  03/31/19 3745

## 2019-03-31 NOTE — ED PROVIDER NOTES
hyperactivity disorder)     Asthma          SURGICAL HISTORY     Past Surgical History:   Procedure Laterality Date    TUMOR REMOVAL  03/18/2019    left side of throat          CURRENTMEDICATIONS       Discharge Medication List as of 3/31/2019  4:29 PM      CONTINUE these medications which have NOT CHANGED    Details   ondansetron (ZOFRAN ODT) 4 MG disintegrating tablet Take 1 tablet by mouth every 8 hours as needed for Nausea, Disp-20 tablet, R-0Print      famotidine (PEPCID) 20 MG tablet Take 1 tablet by mouth 2 times daily, Disp-60 tablet, R-0Print      naproxen (NAPROSYN) 500 MG tablet Take 1 tablet by mouth 2 times daily for 20 doses, Disp-20 tablet, R-0Print      cephALEXin (KEFLEX) 500 MG capsule Take 1 capsule by mouth 4 times daily for 14 days, Disp-56 capsule, R-0Print               ALLERGIES     Patient has no known allergies. FAMILYHISTORY     History reviewed. No pertinent family history.        SOCIAL HISTORY       Social History     Socioeconomic History    Marital status: Single     Spouse name: None    Number of children: None    Years of education: None    Highest education level: None   Occupational History    None   Social Needs    Financial resource strain: None    Food insecurity:     Worry: None     Inability: None    Transportation needs:     Medical: None     Non-medical: None   Tobacco Use    Smoking status: Current Every Day Smoker     Packs/day: 0.25     Types: Cigarettes    Smokeless tobacco: Never Used   Substance and Sexual Activity    Alcohol use: No    Drug use: Yes     Types: Marijuana     Comment: yesterday     Sexual activity: Yes     Partners: Male   Lifestyle    Physical activity:     Days per week: None     Minutes per session: None    Stress: None   Relationships    Social connections:     Talks on phone: None     Gets together: None     Attends Mu-ism service: None     Active member of club or organization: None     Attends meetings of clubs or organizations: None     Relationship status: None    Intimate partner violence:     Fear of current or ex partner: None     Emotionally abused: None     Physically abused: None     Forced sexual activity: None   Other Topics Concern    None   Social History Narrative    None       SCREENINGS    McIntyre Coma Scale  Eye Opening: Spontaneous  Best Verbal Response: Oriented  Best Motor Response: Obeys commands  McIntyre Coma Scale Score: 15        PHYSICAL EXAM    (up to 7 for level 4, 8 or more for level 5)     ED Triage Vitals [03/31/19 1431]   BP Temp Temp Source Pulse Resp SpO2 Height Weight   111/70 97.8 °F (36.6 °C) Oral 115 16 99 % 4' 11\" (1.499 m) 130 lb (59 kg)       Physical Exam    DIAGNOSTIC RESULTS   LABS:    Labs Reviewed   CBC WITH AUTO DIFFERENTIAL - Abnormal; Notable for the following components:       Result Value    WBC 11.9 (*)     RDW 12.3 (*)     Neutrophils # 9.8 (*)     All other components within normal limits    Narrative:     Performed [facility]  [facility],  [employer]   Phone [phone]   COMPREHENSIVE METABOLIC PANEL W/ REFLEX TO MG FOR LOW K - Abnormal; Notable for the following components:     Total Bilirubin 1.4 (*)     AST 13 (*)     All other components within normal limits    Narrative:     Performed at:  [facility],  [employer]   Phone [phone]   URINE RT REFLEX TO CULTURE - Abnormal; Notable for the following components:    Clarity, UA SL CLOUDY (*)     Bilirubin Urine SMALL (*)     Ketones, Urine 15 (*)     Blood, Urine TRACE-INTACT (*)     Protein, UA TRACE (*)     Leukocyte Esterase, Urine SMALL (*)     All other components within normal limits    Narrative:     Performed at:  [facility],  [employer]   Phone [phone]   MICROSCOPIC URINALYSIS - Abnormal; Notable for the following components:    Mucus, UA 2+ (*)     WBC, UA 20-50 (*)     RBC, UA 3-5 (*)     Bacteria, UA 2+ (*)     All other components within normal limits    Narrative:     Performed at:  [facility],  [place], [facility]   Phone [phone]   URINE CULTURE   HCG, SERUM, QUALITATIVE    Narrative:     Performed at:  [place] [place] [address],  [place], [facility]   Phone [phone]   LIPASE    Narrative:     Performed at:  [facility],  [place], [place]   Phone [phone]       All other labs were within normal range or not returned as of this dictation. EKG: All EKG's are interpreted by the Emergency Department Physician who either signs orCo-signs this chart in the absence of a cardiologist.  Please see their note for interpretation of EKG. RADIOLOGY:   Non-plain film images such as CT, Ultrasound and MRI are read by the radiologist. Plain radiographic images are visualized andpreliminarily interpreted by the  ED Provider with the below findings:        Interpretation perthe Radiologist below, if available at the time of this note:    CT ABDOMEN PELVIS W IV CONTRAST Additional Contrast? None   Final Result   1. No acute findings within the abdomen. Normal appendix. 2. No acute findings within the pelvis. No results found.       PROCEDURES   Unless otherwise noted below, none     Procedures    CRITICAL CARE TIME   N/A    CONSULTS:  None      EMERGENCY DEPARTMENT COURSE and DIFFERENTIALDIAGNOSIS/MDM:   Vitals:    Vitals:    03/31/19 1603 03/31/19 1622 03/31/19 1642 03/31/19 1646   BP: 90/62 99/67 92/61 94/66   Pulse: 80 84 80 81   Resp: 16 30 14 16   Temp:       TempSrc:       SpO2: 100% 100% 100% 100%   Weight:       Height:           Patient was given thefollowing medications:  Medications   ondansetron (ZOFRAN) injection 4 mg (4 mg Intravenous Given 3/31/19 1517)   0.9 % sodium chloride bolus (0 mLs Intravenous Stopped 3/31/19 1628)   ketorolac (TORADOL) injection 15 mg (15 mg Intravenous Given 3/31/19 1517)   iopamidol (ISOVUE-370) 76 % injection 75 mL (75 mLs Intravenous Given 3/31/19 1529)   cyclobenzaprine (FLEXERIL) tablet 10 mg (10 mg Oral Given 3/31/19 1630)       Patient brought in today for complaints of right lower abdominal pain and nausea and vomiting. Patient states she was recently diagnosed with a UTI given keflex however now she is experiencing RLQ pain. On exam she is alert oriented tachycardic at 115. Afebrile breathing comfortably and room air satting at 99%. Appears nontoxic in no respiratory distress. Patient does have right lower quadrant pain when palpated. No rigidity or guarding. She does have some right-sided CVA tenderness. Patient was given fluids and Toradol and Zofran here. She does have a white count of 11.9. CT scan of abdomen and pelvis unremarkable. She has a normal-appearing appendix no evidence of pyelonephritis on imaging. Patient is still taking Keflex for her previous urinary tract infection. After re-eval, patient states she is feeling much better. Plan will be to discharge with Flexeril and advised her to continue taking Naprosyn for pain control as pain appears to be worse with movement. I also wrote her a prescription for Zofran for continued nausea. Encourage plenty of fluids at home. I advised her to return with any new or worsening symptoms. Seen and evaluated by my attending who agreed with assessment and plan.      Results for orders placed or performed during the hospital encounter of 03/31/19   CBC auto differential   Result Value Ref Range    WBC 11.9 (H) 4.0 - 11.0 K/uL    RBC 4.74 4.00 - 5.20 M/uL    Hemoglobin 14.3 12.0 - 16.0 g/dL    Hematocrit 41.3 36.0 - 48.0 %    MCV 87.0 80.0 - 100.0 fL    MCH 30.1 26.0 - 34.0 pg    MCHC 34.6 31.0 - 36.0 g/dL    RDW 12.3 (L) 12.4 - 15.4 %    Platelets 364 135 - 450 K/uL    MPV 7.0 5.0 - 10.5 fL    Neutrophils % 82.1 %    Lymphocytes % 11.2 %    Monocytes % 5.1 %    Eosinophils % 0.9 %    Basophils % 0.7 %    Neutrophils # 9.8 (H) 1.7 - 7.7 K/uL    Lymphocytes # 1.3 1.0 - 5.1 K/uL    Monocytes # 0.6 0.0 - 1.3 K/uL    Eosinophils # 0.1 0.0 - 0.6 K/uL    Basophils # 0.1 0.0 - 0.2 K/uL   Comprehensive Metabolic Panel w/ Reflex to MG   Result Value Ref Range    Sodium 140 136 - 145 mmol/L    Potassium reflex Magnesium 4.0 3.5 - 5.1 mmol/L    Chloride 104 99 - 110 mmol/L    CO2 23 21 - 32 mmol/L    Anion Gap 13 3 - 16    Glucose 93 70 - 99 mg/dL    BUN 7 7 - 20 mg/dL    CREATININE 0.7 0.6 - 1.1 mg/dL    GFR Non-African American >60 >60    GFR African American >60 >60    Calcium 9.2 8.3 - 10.6 mg/dL    Total Protein 7.8 6.4 - 8.2 g/dL    Alb 4.3 3.4 - 5.0 g/dL    Albumin/Globulin Ratio 1.2 1.1 - 2.2    Total Bilirubin 1.4 (H) 0.0 - 1.0 mg/dL    Alkaline Phosphatase 90 40 - 129 U/L    ALT 23 10 - 40 U/L    AST 13 (L) 15 - 37 U/L    Globulin 3.5 g/dL   Urinalysis Reflex to Culture   Result Value Ref Range    Color, UA Yellow Straw/Yellow    Clarity, UA SL CLOUDY (A) Clear    Glucose, Ur Negative Negative mg/dL    Bilirubin Urine SMALL (A) Negative    Ketones, Urine 15 (A) Negative mg/dL    Specific Gravity, UA >=1.030 1.005 - 1.030    Blood, Urine TRACE-INTACT (A) Negative    pH, UA 5.5 5.0 - 8.0    Protein, UA TRACE (A) Negative mg/dL    Urobilinogen, Urine 0.2 <2.0 E.U./dL    Nitrite, Urine Negative Negative    Leukocyte Esterase, Urine SMALL (A) Negative    Microscopic Examination YES     Urine Reflex to Culture Yes     Urine Type Not Specified    hCG, serum, qualitative   Result Value Ref Range    hCG Qual Negative Detects HCG level >10 MIU/mL   Lipase   Result Value Ref Range    Lipase 17.0 13.0 - 60.0 U/L   Microscopic Urinalysis   Result Value Ref Range    Mucus, UA 2+ (A) /LPF    WBC, UA 20-50 (A) 0 - 5 /HPF    RBC, UA 3-5 (A) 0 - 2 /HPF    Epi Cells 20-50 /HPF Bacteria, UA 2+ (A) /HPF         I estimate there is LOW risk for ACUTE APPENDICITIS, BOWEL OBSTRUCTION, CHOLECYSTITIS, DIVERTICULITIS, INCARCERATED HERNIA, PANCREATITIS, or PERFORATED BOWEL or ULCER, thus I consider the discharge disposition reasonable. Also, there is no evidence or peritonitis, sepsis, or toxicity. Tanya Toro and I have discussed the diagnosis and risks, and we agree with discharging home to follow-up with their primary doctor. We also discussed returning to the Emergency Department immediately if new or worsening symptoms occur. We have discussed the symptoms which are most concerning (e.g., bloody stool, fever, changing or worsening pain, vomiting) that necessitate immediate return. FINAL Impression    1. Right sided abdominal pain        Blood pressure 94/66, pulse 81, temperature 97.8 °F (36.6 °C), temperature source Oral, resp. rate 16, height 4' 11\" (1.499 m), weight 130 lb (59 kg), last menstrual period 03/07/2019, SpO2 100 %. FINAL IMPRESSION      1.  Right sided abdominal pain          DISPOSITION/PLAN   DISPOSITION        PATIENT REFERREDTO:  [facility]) Pre-Services  [phone]          DISCHARGE MEDICATIONS:  Discharge Medication List as of 3/31/2019  4:29 PM      START taking these medications    Details   cyclobenzaprine (FLEXERIL) 10 MG tablet Take 1 tablet by mouth 3 times daily as needed for Muscle spasms, Disp-20 tablet, R-0Print      ondansetron (ZOFRAN) 4 MG tablet Take 1 tablet by mouth every 8 hours as needed for Nausea, Disp-20 tablet, R-0Print             DISCONTINUED MEDICATIONS:  Discharge Medication List as of 3/31/2019  4:29 PM                 (Please note that portions ofthis note were completed with a voice recognition program.  Efforts were made to edit the dictations but occasionally words are mis-transcribed.)    Khalif Godinez PA-C (electronically signed)           Khalif Godinez PA-C  03/31/19 9034

## 2019-03-31 NOTE — ED NOTES
Saline lock removed intact. IV site looked unremarkable. Pressure dressing applied. Discharge instructions reviewed with Ms. Candie Ruiz. She verbalized understanding. Copy of discharge instructions and prescriptions given. She was advised not to drive, drink ETOH, operate machinery, or supervise small children after receiving pain medications here in the ED or while taking pain medications at home. She verbalized understanding. Ms. Candie Ruiz was discharged to home in good condition per personal vehicle, friend/family driving. She exited the ED without difficulty.       Emmanuel Burden RN  03/31/19 2195

## 2019-04-02 LAB — URINE CULTURE, ROUTINE: NORMAL

## 2019-05-21 ENCOUNTER — HOSPITAL ENCOUNTER (EMERGENCY)
Age: 19
Discharge: HOME | End: 2019-05-21
Payer: MEDICAID

## 2019-05-21 ENCOUNTER — APPOINTMENT (OUTPATIENT)
Dept: GENERAL RADIOLOGY | Age: 19
End: 2019-05-21
Payer: MEDICAID

## 2019-05-21 VITALS
OXYGEN SATURATION: 100 % | HEART RATE: 100 BPM | HEIGHT: 59 IN | WEIGHT: 130 LBS | RESPIRATION RATE: 18 BRPM | BODY MASS INDEX: 26.21 KG/M2 | SYSTOLIC BLOOD PRESSURE: 112 MMHG | TEMPERATURE: 97.7 F | DIASTOLIC BLOOD PRESSURE: 71 MMHG

## 2019-05-21 DIAGNOSIS — J40 BRONCHITIS: Primary | ICD-10-CM

## 2019-05-21 PROCEDURE — 99283 EMERGENCY DEPT VISIT LOW MDM: CPT

## 2019-05-21 PROCEDURE — 71046 X-RAY EXAM CHEST 2 VIEWS: CPT

## 2019-05-21 RX ORDER — BENZONATATE 100 MG/1
100 CAPSULE ORAL 3 TIMES DAILY PRN
Qty: 30 CAPSULE | Refills: 0 | Status: SHIPPED | OUTPATIENT
Start: 2019-05-21 | End: 2019-05-31

## 2019-05-21 RX ORDER — PREDNISONE 10 MG/1
TABLET ORAL
Qty: 20 TABLET | Refills: 0 | Status: SHIPPED | OUTPATIENT
Start: 2019-05-21 | End: 2019-05-31

## 2019-05-21 NOTE — LETTER
[facility] ED  [facility] [address]  Phone: [phone]         May 21, 2019     Patient: Tanya Toro   YOB: 2000   Date of Visit: 5/21/2019       To Whom It May Concern:    Forrest Raymond was seen and treated in our emergency department on 5/21/2019.         Sincerely,        Lily Ko RN        Signature:__________________________________

## 2019-05-24 NOTE — ED PROVIDER NOTES
EVALUATED BY THE [facility]    CHIEF COMPLAINT  Cough (productive grenn cough x 2 days. Pt states she tried allergy medicine once and all it did was clear her throat. )      HISTORY OF Carolin Cervantes is a 19 y.o. female who presents to the ED for evaluation of a cough. Patient states she has had a cough for the last 2 days. No fever. No chest pain. Patient states she is on allergy medicine because she thinks its allergy related however it has not helped. No nausea or vomiting. Here for further evaluation. No other complaints, modifying factors or associated symptoms. Nursing notes reviewed. Past Medical History:   Diagnosis Date    ADHD (attention deficit hyperactivity disorder)     Asthma      Past Surgical History:   Procedure Laterality Date    TUMOR REMOVAL  03/18/2019    left side of throat      History reviewed. No pertinent family history.   Social History     Socioeconomic History    Marital status: Single     Spouse name: Not on file    Number of children: Not on file    Years of education: Not on file    Highest education level: Not on file   Occupational History    Not on file   Social Needs    Financial resource strain: Not on file    Food insecurity:     Worry: Not on file     Inability: Not on file    Transportation needs:     Medical: Not on file     Non-medical: Not on file   Tobacco Use    Smoking status: Current Every Day Smoker     Packs/day: 0.25     Types: Cigarettes    Smokeless tobacco: Never Used   Substance and Sexual Activity    Alcohol use: No    Drug use: Yes     Types: Marijuana     Comment: yesterday     Sexual activity: Yes     Partners: Male   Lifestyle    Physical activity:     Days per week: Not on file     Minutes per session: Not on file    Stress: Not on file   Relationships    Social connections:     Talks on phone: Not on file     Gets together: Not on file     Attends [employer] service: Not on file     Active member of club or organization: Not on file     Attends meetings of clubs or organizations: Not on file     Relationship status: Not on file    Intimate partner violence:     Fear of current or ex partner: Not on file     Emotionally abused: Not on file     Physically abused: Not on file     Forced sexual activity: Not on file   Other Topics Concern    Not on file   Social History Narrative    Not on file     No current facility-administered medications for this encounter. Current Outpatient Medications   Medication Sig Dispense Refill    predniSONE (DELTASONE) 10 MG tablet Take 4 tablets by mouth once daily for 5 days 20 tablet 0    benzonatate (TESSALON PERLES) 100 MG capsule Take 1 capsule by mouth 3 times daily as needed for Cough 30 capsule 0    famotidine (PEPCID) 20 MG tablet Take 1 tablet by mouth 2 times daily 60 tablet 0    ondansetron (ZOFRAN) 4 MG tablet Take 1 tablet by mouth every 8 hours as needed for Nausea 20 tablet 0    ondansetron (ZOFRAN ODT) 4 MG disintegrating tablet Take 1 tablet by mouth every 8 hours as needed for Nausea 20 tablet 0    naproxen (NAPROSYN) 500 MG tablet Take 1 tablet by mouth 2 times daily for 20 doses 20 tablet 0     No Known Allergies    REVIEW OF SYSTEMS  6 systems reviewed, pertinent positives per HPI otherwise noted to be negative    PHYSICAL EXAM  /71   Pulse 100   Temp 97.7 °F (36.5 °C) (Oral)   Resp 18   Ht 4' 11\" (1.499 m)   Wt 130 lb (59 kg)   LMP 04/21/2019   SpO2 100%   BMI 26.26 kg/m²   GENERAL APPEARANCE: Awake and alert. Cooperative. No acute distress. HEAD: Normocephalic. Atraumatic. EYES: No outward signs of trauma. No obvious abnormality. EOM's grossly intact. ENT: Mucous membranes are moist. Oropharynx is clear, uvula midline. TM clear bilaterally. NECK: Supple. Normal ROM. CHEST: Equal symmetric chest rise. RRR  LUNGS: Breathing is unlabored. Speaking comfortably in full sentences. CTAB  Abdomen: Nondistended. nontender  EXTREMITIES: MAEE. No acute deformities. SKIN: Warm and dry. NEUROLOGICAL: Alert and oriented. Strength is 5/5 in all extremities and sensation is intact. Labs:    No results found for this visit on 05/21/19. RADIOLOGY  Xr Chest Standard (2 Vw)    Result Date: 5/21/2019  EXAMINATION: TWO XRAY VIEWS OF THE CHEST 5/21/2019 11:54 am COMPARISON: 02/15/2013 HISTORY: ORDERING SYSTEM PROVIDED HISTORY: cough TECHNOLOGIST PROVIDED HISTORY: Reason for exam:->cough Ordering Physician Provided Reason for Exam: cough Acuity: Acute Type of Exam: Initial Additional signs and symptoms:  (productive grenn cough x 2 days. Pt states she tried allergy medicine once and all it did was clear her throat. ) FINDINGS: The heart and pulmonary vascularity are within normal limits. There are no focal areas of consolidation or pleural effusion. The osseous structures are intact. Negative         ED COURSE/MDM  Patient seen and evaluated here in the ER with the supervising physician available for consultation. Patient presented to the ER with complaints of a cough. Patient chest xray was negative. Patient started on antitussive as well as prednisone. Patient was discharged home in good condition. Patient was given scripts for the following medications. I counseled patient how to take these medications. Discharge Medication List as of 5/21/2019 12:32 PM      START taking these medications    Details   predniSONE (DELTASONE) 10 MG tablet Take 4 tablets by mouth once daily for 5 days, Disp-20 tablet, R-0Print      benzonatate (TESSALON PERLES) 100 MG capsule Take 1 capsule by mouth 3 times daily as needed for Cough, Disp-30 capsule, R-0Print                 CLINICAL IMPRESSION  1. Bronchitis        Blood pressure 112/71, pulse 100, temperature 97.7 °F (36.5 °C), temperature source Oral, resp. rate 18, height 4' 11\" (1.499 m), weight 130 lb (59 kg), last menstrual period 04/21/2019, SpO2 100 %.     DISPOSITION  Patient was discharged to home in

## 2019-07-10 ENCOUNTER — APPOINTMENT (OUTPATIENT)
Dept: ULTRASOUND IMAGING | Age: 19
End: 2019-07-10
Payer: MEDICAID

## 2019-07-10 ENCOUNTER — HOSPITAL ENCOUNTER (EMERGENCY)
Age: 19
Discharge: HOME | End: 2019-07-10
Attending: EMERGENCY MEDICINE
Payer: MEDICAID

## 2019-07-10 VITALS
TEMPERATURE: 98.6 F | WEIGHT: 140 LBS | RESPIRATION RATE: 18 BRPM | HEART RATE: 90 BPM | SYSTOLIC BLOOD PRESSURE: 124 MMHG | HEIGHT: 59 IN | OXYGEN SATURATION: 97 % | DIASTOLIC BLOOD PRESSURE: 80 MMHG | BODY MASS INDEX: 28.22 KG/M2

## 2019-07-10 DIAGNOSIS — Z3A.01 LESS THAN 8 WEEKS GESTATION OF PREGNANCY: Primary | ICD-10-CM

## 2019-07-10 LAB
A/G RATIO: 1.5 (ref 1.1–2.2)
ABO/RH: NORMAL
ALBUMIN SERPL-MCNC: 4.4 G/DL (ref 3.4–5)
ALP BLD-CCNC: 60 U/L (ref 40–129)
ALT SERPL-CCNC: 16 U/L (ref 10–40)
ANION GAP SERPL CALCULATED.3IONS-SCNC: 12 MMOL/L (ref 3–16)
AST SERPL-CCNC: 17 U/L (ref 15–37)
BASOPHILS ABSOLUTE: 0.1 K/UL (ref 0–0.2)
BASOPHILS RELATIVE PERCENT: 0.7 %
BILIRUB SERPL-MCNC: 0.8 MG/DL (ref 0–1)
BILIRUBIN URINE: NEGATIVE
BLOOD, URINE: NEGATIVE
BUN BLDV-MCNC: 10 MG/DL (ref 7–20)
CALCIUM SERPL-MCNC: 9.3 MG/DL (ref 8.3–10.6)
CHLORIDE BLD-SCNC: 104 MMOL/L (ref 99–110)
CLARITY: CLEAR
CO2: 21 MMOL/L (ref 21–32)
COLOR: YELLOW
CREAT SERPL-MCNC: 0.6 MG/DL (ref 0.6–1.1)
EOSINOPHILS ABSOLUTE: 0.5 K/UL (ref 0–0.6)
EOSINOPHILS RELATIVE PERCENT: 5.2 %
GFR AFRICAN AMERICAN: >60
GFR NON-AFRICAN AMERICAN: >60
GLOBULIN: 2.9 G/DL
GLUCOSE BLD-MCNC: 123 MG/DL (ref 70–99)
GLUCOSE URINE: NEGATIVE MG/DL
GONADOTROPIN, CHORIONIC (HCG) QUANT: 1365 MIU/ML
HCT VFR BLD CALC: 42.9 % (ref 36–48)
HEMOGLOBIN: 15.1 G/DL (ref 12–16)
KETONES, URINE: NEGATIVE MG/DL
LEUKOCYTE ESTERASE, URINE: NEGATIVE
LYMPHOCYTES ABSOLUTE: 2.5 K/UL (ref 1–5.1)
LYMPHOCYTES RELATIVE PERCENT: 26.4 %
MCH RBC QN AUTO: 30.9 PG (ref 26–34)
MCHC RBC AUTO-ENTMCNC: 35.1 G/DL (ref 31–36)
MCV RBC AUTO: 88 FL (ref 80–100)
MICROSCOPIC EXAMINATION: NORMAL
MONOCYTES ABSOLUTE: 0.4 K/UL (ref 0–1.3)
MONOCYTES RELATIVE PERCENT: 4.3 %
NEUTROPHILS ABSOLUTE: 6 K/UL (ref 1.7–7.7)
NEUTROPHILS RELATIVE PERCENT: 63.4 %
NITRITE, URINE: NEGATIVE
PDW BLD-RTO: 12.2 % (ref 12.4–15.4)
PH UA: 7 (ref 5–8)
PLATELET # BLD: 282 K/UL (ref 135–450)
PMV BLD AUTO: 6.9 FL (ref 5–10.5)
POTASSIUM REFLEX MAGNESIUM: 3.8 MMOL/L (ref 3.5–5.1)
PROTEIN UA: NEGATIVE MG/DL
RBC # BLD: 4.88 M/UL (ref 4–5.2)
SODIUM BLD-SCNC: 137 MMOL/L (ref 136–145)
SPECIFIC GRAVITY UA: 1.01 (ref 1–1.03)
TOTAL PROTEIN: 7.3 G/DL (ref 6.4–8.2)
URINE REFLEX TO CULTURE: NORMAL
URINE TYPE: NORMAL
UROBILINOGEN, URINE: 0.2 E.U./DL
WBC # BLD: 9.4 K/UL (ref 4–11)

## 2019-07-10 PROCEDURE — 99284 EMERGENCY DEPT VISIT MOD MDM: CPT

## 2019-07-10 PROCEDURE — 80053 COMPREHEN METABOLIC PANEL: CPT

## 2019-07-10 PROCEDURE — 84702 CHORIONIC GONADOTROPIN TEST: CPT

## 2019-07-10 PROCEDURE — 86900 BLOOD TYPING SEROLOGIC ABO: CPT

## 2019-07-10 PROCEDURE — 81003 URINALYSIS AUTO W/O SCOPE: CPT

## 2019-07-10 PROCEDURE — 86901 BLOOD TYPING SEROLOGIC RH(D): CPT

## 2019-07-10 PROCEDURE — 76817 TRANSVAGINAL US OBSTETRIC: CPT

## 2019-07-10 PROCEDURE — 85025 COMPLETE CBC W/AUTO DIFF WBC: CPT

## 2019-07-10 PROCEDURE — 76801 OB US < 14 WKS SINGLE FETUS: CPT

## 2019-07-10 RX ORDER — PNV NO.95/FERROUS FUM/FOLIC AC 28MG-0.8MG
1 TABLET ORAL DAILY
Qty: 30 TABLET | Refills: 0 | Status: SHIPPED | OUTPATIENT
Start: 2019-07-10

## 2019-07-10 ASSESSMENT — PAIN SCALES - GENERAL: PAINLEVEL_OUTOF10: 0

## 2019-07-10 ASSESSMENT — ENCOUNTER SYMPTOMS
ABDOMINAL PAIN: 1
SHORTNESS OF BREATH: 0

## 2019-07-13 ENCOUNTER — HOSPITAL ENCOUNTER (OUTPATIENT)
Age: 19
Discharge: HOME | End: 2019-07-13
Payer: MEDICAID

## 2019-07-13 LAB — GONADOTROPIN, CHORIONIC (HCG) QUANT: 4960 MIU/ML

## 2019-07-13 PROCEDURE — 84702 CHORIONIC GONADOTROPIN TEST: CPT

## 2019-07-13 PROCEDURE — 36415 COLL VENOUS BLD VENIPUNCTURE: CPT

## 2019-09-03 ENCOUNTER — APPOINTMENT (OUTPATIENT)
Dept: GENERAL RADIOLOGY | Age: 19
End: 2019-09-03
Payer: MEDICAID

## 2019-09-03 ENCOUNTER — HOSPITAL ENCOUNTER (EMERGENCY)
Age: 19
Discharge: HOME | End: 2019-09-04
Attending: EMERGENCY MEDICINE
Payer: MEDICAID

## 2019-09-03 DIAGNOSIS — Z3A.14 14 WEEKS GESTATION OF PREGNANCY: ICD-10-CM

## 2019-09-03 DIAGNOSIS — B34.9 VIRAL ILLNESS: Primary | ICD-10-CM

## 2019-09-03 DIAGNOSIS — J20.9 ACUTE BRONCHITIS, UNSPECIFIED ORGANISM: ICD-10-CM

## 2019-09-03 LAB
A/G RATIO: 1.4 (ref 1.1–2.2)
ALBUMIN SERPL-MCNC: 4.2 G/DL (ref 3.4–5)
ALP BLD-CCNC: 45 U/L (ref 40–129)
ALT SERPL-CCNC: 13 U/L (ref 10–40)
ANION GAP SERPL CALCULATED.3IONS-SCNC: 13 MMOL/L (ref 3–16)
AST SERPL-CCNC: 12 U/L (ref 15–37)
BACTERIA: ABNORMAL /HPF
BASOPHILS ABSOLUTE: 0 K/UL (ref 0–0.2)
BASOPHILS RELATIVE PERCENT: 0.3 %
BILIRUB SERPL-MCNC: 0.6 MG/DL (ref 0–1)
BILIRUBIN URINE: NEGATIVE
BLOOD, URINE: NEGATIVE
BUN BLDV-MCNC: 4 MG/DL (ref 7–20)
CALCIUM SERPL-MCNC: 9.6 MG/DL (ref 8.3–10.6)
CHLORIDE BLD-SCNC: 105 MMOL/L (ref 99–110)
CLARITY: CLEAR
CO2: 21 MMOL/L (ref 21–32)
COLOR: YELLOW
CREAT SERPL-MCNC: <0.5 MG/DL (ref 0.6–1.1)
EOSINOPHILS ABSOLUTE: 0.2 K/UL (ref 0–0.6)
EOSINOPHILS RELATIVE PERCENT: 1.4 %
EPITHELIAL CELLS, UA: ABNORMAL /HPF
GFR AFRICAN AMERICAN: >60
GFR NON-AFRICAN AMERICAN: >60
GLOBULIN: 2.9 G/DL
GLUCOSE BLD-MCNC: 103 MG/DL (ref 70–99)
GLUCOSE URINE: NEGATIVE MG/DL
GONADOTROPIN, CHORIONIC (HCG) QUANT: NORMAL MIU/ML
HCT VFR BLD CALC: 36.9 % (ref 36–48)
HEMOGLOBIN: 13.1 G/DL (ref 12–16)
KETONES, URINE: 15 MG/DL
LEUKOCYTE ESTERASE, URINE: NEGATIVE
LYMPHOCYTES ABSOLUTE: 2.4 K/UL (ref 1–5.1)
LYMPHOCYTES RELATIVE PERCENT: 14.9 %
MCH RBC QN AUTO: 30.6 PG (ref 26–34)
MCHC RBC AUTO-ENTMCNC: 35.6 G/DL (ref 31–36)
MCV RBC AUTO: 86.1 FL (ref 80–100)
MICROSCOPIC EXAMINATION: YES
MONOCYTES ABSOLUTE: 0.6 K/UL (ref 0–1.3)
MONOCYTES RELATIVE PERCENT: 3.5 %
MUCUS: ABNORMAL /LPF
NEUTROPHILS ABSOLUTE: 12.9 K/UL (ref 1.7–7.7)
NEUTROPHILS RELATIVE PERCENT: 79.9 %
NITRITE, URINE: NEGATIVE
PDW BLD-RTO: 12.4 % (ref 12.4–15.4)
PH UA: 6 (ref 5–8)
PLATELET # BLD: 258 K/UL (ref 135–450)
PMV BLD AUTO: 7.1 FL (ref 5–10.5)
POTASSIUM SERPL-SCNC: 3.2 MMOL/L (ref 3.5–5.1)
PROTEIN UA: ABNORMAL MG/DL
RBC # BLD: 4.29 M/UL (ref 4–5.2)
RBC UA: ABNORMAL /HPF (ref 0–2)
SODIUM BLD-SCNC: 139 MMOL/L (ref 136–145)
SPECIFIC GRAVITY UA: >=1.03 (ref 1–1.03)
TOTAL PROTEIN: 7.1 G/DL (ref 6.4–8.2)
URINE REFLEX TO CULTURE: ABNORMAL
URINE TYPE: ABNORMAL
UROBILINOGEN, URINE: 0.2 E.U./DL
WBC # BLD: 16.1 K/UL (ref 4–11)
WBC UA: ABNORMAL /HPF (ref 0–5)

## 2019-09-03 PROCEDURE — 71046 X-RAY EXAM CHEST 2 VIEWS: CPT

## 2019-09-03 PROCEDURE — 80053 COMPREHEN METABOLIC PANEL: CPT

## 2019-09-03 PROCEDURE — 81001 URINALYSIS AUTO W/SCOPE: CPT

## 2019-09-03 PROCEDURE — 6360000002 HC RX W HCPCS: Performed by: NURSE PRACTITIONER

## 2019-09-03 PROCEDURE — 2580000003 HC RX 258: Performed by: NURSE PRACTITIONER

## 2019-09-03 PROCEDURE — 96361 HYDRATE IV INFUSION ADD-ON: CPT

## 2019-09-03 PROCEDURE — 96374 THER/PROPH/DIAG INJ IV PUSH: CPT

## 2019-09-03 PROCEDURE — 99284 EMERGENCY DEPT VISIT MOD MDM: CPT

## 2019-09-03 PROCEDURE — 84702 CHORIONIC GONADOTROPIN TEST: CPT

## 2019-09-03 PROCEDURE — 85025 COMPLETE CBC W/AUTO DIFF WBC: CPT

## 2019-09-03 PROCEDURE — 6370000000 HC RX 637 (ALT 250 FOR IP): Performed by: NURSE PRACTITIONER

## 2019-09-03 RX ORDER — 0.9 % SODIUM CHLORIDE 0.9 %
1000 INTRAVENOUS SOLUTION INTRAVENOUS ONCE
Status: COMPLETED | OUTPATIENT
Start: 2019-09-03 | End: 2019-09-04

## 2019-09-03 RX ORDER — POTASSIUM CHLORIDE 20 MEQ/1
40 TABLET, EXTENDED RELEASE ORAL ONCE
Status: COMPLETED | OUTPATIENT
Start: 2019-09-03 | End: 2019-09-03

## 2019-09-03 RX ORDER — METOCLOPRAMIDE HYDROCHLORIDE 5 MG/ML
10 INJECTION INTRAMUSCULAR; INTRAVENOUS ONCE
Status: COMPLETED | OUTPATIENT
Start: 2019-09-03 | End: 2019-09-03

## 2019-09-03 RX ADMIN — SODIUM CHLORIDE 1000 ML: 9 INJECTION, SOLUTION INTRAVENOUS at 23:43

## 2019-09-03 RX ADMIN — METOCLOPRAMIDE 10 MG: 5 INJECTION, SOLUTION INTRAMUSCULAR; INTRAVENOUS at 23:43

## 2019-09-03 RX ADMIN — POTASSIUM CHLORIDE 40 MEQ: 20 TABLET, EXTENDED RELEASE ORAL at 23:43

## 2019-09-03 ASSESSMENT — PAIN DESCRIPTION - LOCATION: LOCATION: CHEST;HEAD;THROAT

## 2019-09-03 ASSESSMENT — PAIN SCALES - GENERAL: PAINLEVEL_OUTOF10: 8

## 2019-09-03 ASSESSMENT — PAIN DESCRIPTION - PAIN TYPE: TYPE: ACUTE PAIN

## 2019-09-04 VITALS
HEIGHT: 59 IN | SYSTOLIC BLOOD PRESSURE: 112 MMHG | OXYGEN SATURATION: 100 % | BODY MASS INDEX: 28.83 KG/M2 | TEMPERATURE: 98.5 F | WEIGHT: 143 LBS | HEART RATE: 100 BPM | RESPIRATION RATE: 14 BRPM | DIASTOLIC BLOOD PRESSURE: 81 MMHG

## 2019-09-04 LAB
RAPID INFLUENZA  B AGN: NEGATIVE
RAPID INFLUENZA A AGN: NEGATIVE
S PYO AG THROAT QL: NEGATIVE

## 2019-09-04 PROCEDURE — 6370000000 HC RX 637 (ALT 250 FOR IP): Performed by: NURSE PRACTITIONER

## 2019-09-04 PROCEDURE — 87804 INFLUENZA ASSAY W/OPTIC: CPT

## 2019-09-04 PROCEDURE — 87880 STREP A ASSAY W/OPTIC: CPT

## 2019-09-04 PROCEDURE — 96361 HYDRATE IV INFUSION ADD-ON: CPT

## 2019-09-04 PROCEDURE — 87081 CULTURE SCREEN ONLY: CPT

## 2019-09-04 RX ORDER — METOCLOPRAMIDE 10 MG/1
10 TABLET ORAL 4 TIMES DAILY
Qty: 20 TABLET | Refills: 0 | Status: SHIPPED | OUTPATIENT
Start: 2019-09-04 | End: 2020-02-16 | Stop reason: ALTCHOICE

## 2019-09-04 RX ORDER — GUAIFENESIN/DEXTROMETHORPHAN 100-10MG/5
5 SYRUP ORAL 3 TIMES DAILY PRN
Qty: 120 ML | Refills: 0 | Status: SHIPPED | OUTPATIENT
Start: 2019-09-04 | End: 2019-09-14

## 2019-09-04 RX ORDER — PSEUDOEPHEDRINE HCL 120 MG/1
120 TABLET, FILM COATED, EXTENDED RELEASE ORAL EVERY 12 HOURS
Qty: 20 TABLET | Refills: 0 | Status: SHIPPED | OUTPATIENT
Start: 2019-09-04 | End: 2019-09-14

## 2019-09-04 RX ORDER — ALBUTEROL SULFATE 90 UG/1
2 AEROSOL, METERED RESPIRATORY (INHALATION) EVERY 4 HOURS PRN
Qty: 1 INHALER | Refills: 0 | Status: SHIPPED | OUTPATIENT
Start: 2019-09-04 | End: 2020-02-16 | Stop reason: ALTCHOICE

## 2019-09-04 RX ORDER — ACETAMINOPHEN 500 MG
1000 TABLET ORAL ONCE
Status: COMPLETED | OUTPATIENT
Start: 2019-09-04 | End: 2019-09-04

## 2019-09-04 RX ADMIN — ACETAMINOPHEN 1000 MG: 500 TABLET ORAL at 02:14

## 2019-09-04 ASSESSMENT — ENCOUNTER SYMPTOMS
BACK PAIN: 0
COUGH: 1
NAUSEA: 1
WHEEZING: 0
ABDOMINAL PAIN: 0
SHORTNESS OF BREATH: 0
VOMITING: 0
DIARRHEA: 0
COLOR CHANGE: 0

## 2019-09-04 ASSESSMENT — PAIN SCALES - GENERAL
PAINLEVEL_OUTOF10: 3
PAINLEVEL_OUTOF10: 8

## 2019-09-06 LAB — S PYO THROAT QL CULT: NORMAL

## 2019-09-22 ENCOUNTER — HOSPITAL ENCOUNTER (EMERGENCY)
Age: 19
Discharge: HOME | End: 2019-09-22
Attending: EMERGENCY MEDICINE
Payer: MEDICAID

## 2019-09-22 ENCOUNTER — APPOINTMENT (OUTPATIENT)
Dept: ULTRASOUND IMAGING | Age: 19
End: 2019-09-22
Payer: MEDICAID

## 2019-09-22 VITALS
RESPIRATION RATE: 18 BRPM | OXYGEN SATURATION: 100 % | DIASTOLIC BLOOD PRESSURE: 70 MMHG | TEMPERATURE: 97.9 F | HEART RATE: 91 BPM | WEIGHT: 148 LBS | SYSTOLIC BLOOD PRESSURE: 98 MMHG | BODY MASS INDEX: 29.89 KG/M2

## 2019-09-22 DIAGNOSIS — R10.31 RIGHT GROIN PAIN: Primary | ICD-10-CM

## 2019-09-22 DIAGNOSIS — N89.8 VAGINAL DISCHARGE: ICD-10-CM

## 2019-09-22 DIAGNOSIS — O44.02 PLACENTA PREVIA IN SECOND TRIMESTER: ICD-10-CM

## 2019-09-22 DIAGNOSIS — Z34.92 SECOND TRIMESTER PREGNANCY: ICD-10-CM

## 2019-09-22 LAB
A/G RATIO: 1.4 (ref 1.1–2.2)
ABO/RH: NORMAL
ALBUMIN SERPL-MCNC: 3.9 G/DL (ref 3.4–5)
ALP BLD-CCNC: 40 U/L (ref 40–129)
ALT SERPL-CCNC: 11 U/L (ref 10–40)
ANION GAP SERPL CALCULATED.3IONS-SCNC: 12 MMOL/L (ref 3–16)
AST SERPL-CCNC: 18 U/L (ref 15–37)
BACTERIA WET PREP: NORMAL
BILIRUB SERPL-MCNC: 0.3 MG/DL (ref 0–1)
BUN BLDV-MCNC: 5 MG/DL (ref 7–20)
CALCIUM SERPL-MCNC: 9 MG/DL (ref 8.3–10.6)
CHLORIDE BLD-SCNC: 104 MMOL/L (ref 99–110)
CLUE CELLS: NORMAL
CO2: 21 MMOL/L (ref 21–32)
CREAT SERPL-MCNC: <0.5 MG/DL (ref 0.6–1.1)
EPITHELIAL CELLS WET PREP: NORMAL
GFR AFRICAN AMERICAN: >60
GFR NON-AFRICAN AMERICAN: >60
GLOBULIN: 2.8 G/DL
GLUCOSE BLD-MCNC: 113 MG/DL (ref 70–99)
GONADOTROPIN, CHORIONIC (HCG) QUANT: NORMAL MIU/ML
HCT VFR BLD CALC: 35.8 % (ref 36–48)
HEMOGLOBIN: 12.8 G/DL (ref 12–16)
MCH RBC QN AUTO: 31.4 PG (ref 26–34)
MCHC RBC AUTO-ENTMCNC: 35.7 G/DL (ref 31–36)
MCV RBC AUTO: 87.8 FL (ref 80–100)
PDW BLD-RTO: 12.7 % (ref 12.4–15.4)
PLATELET # BLD: 250 K/UL (ref 135–450)
PMV BLD AUTO: 7.1 FL (ref 5–10.5)
POTASSIUM SERPL-SCNC: 4.3 MMOL/L (ref 3.5–5.1)
RBC # BLD: 4.08 M/UL (ref 4–5.2)
RBC WET PREP: NORMAL
SODIUM BLD-SCNC: 137 MMOL/L (ref 136–145)
SOURCE WET PREP: NORMAL
TOTAL PROTEIN: 6.7 G/DL (ref 6.4–8.2)
TRICHOMONAS PREP: NORMAL
WBC # BLD: 10.8 K/UL (ref 4–11)
WBC WET PREP: NORMAL
YEAST WET PREP: NORMAL

## 2019-09-22 PROCEDURE — 80053 COMPREHEN METABOLIC PANEL: CPT

## 2019-09-22 PROCEDURE — 87210 SMEAR WET MOUNT SALINE/INK: CPT

## 2019-09-22 PROCEDURE — 87491 CHLMYD TRACH DNA AMP PROBE: CPT

## 2019-09-22 PROCEDURE — 87591 N.GONORRHOEAE DNA AMP PROB: CPT

## 2019-09-22 PROCEDURE — 84702 CHORIONIC GONADOTROPIN TEST: CPT

## 2019-09-22 PROCEDURE — 85027 COMPLETE CBC AUTOMATED: CPT

## 2019-09-22 PROCEDURE — 86900 BLOOD TYPING SEROLOGIC ABO: CPT

## 2019-09-22 PROCEDURE — 99284 EMERGENCY DEPT VISIT MOD MDM: CPT

## 2019-09-22 PROCEDURE — 76805 OB US >/= 14 WKS SNGL FETUS: CPT

## 2019-09-22 PROCEDURE — 86901 BLOOD TYPING SEROLOGIC RH(D): CPT

## 2019-09-22 ASSESSMENT — ENCOUNTER SYMPTOMS
RHINORRHEA: 0
BACK PAIN: 0
BLOOD IN STOOL: 0
PHOTOPHOBIA: 0
EYE DISCHARGE: 0
COLOR CHANGE: 0
NAUSEA: 0
APNEA: 0
WHEEZING: 0
VOICE CHANGE: 0
TROUBLE SWALLOWING: 0
DIARRHEA: 0
EYE PAIN: 0
COUGH: 0
ABDOMINAL DISTENTION: 0
SINUS PRESSURE: 0
STRIDOR: 0
ABDOMINAL PAIN: 1
CHEST TIGHTNESS: 0
CONSTIPATION: 0
VOMITING: 0
RECTAL PAIN: 0
SHORTNESS OF BREATH: 0
SORE THROAT: 0
EYE REDNESS: 0

## 2019-09-22 ASSESSMENT — PAIN SCALES - GENERAL: PAINLEVEL_OUTOF10: 7

## 2019-09-23 LAB
C TRACH DNA GENITAL QL NAA+PROBE: POSITIVE
N. GONORRHOEAE DNA: NEGATIVE

## 2019-11-01 LAB
ABO, EXTERNAL RESULT: NORMAL
HEP B, EXTERNAL RESULT: NEGATIVE
HIV, EXTERNAL RESULT: NEGATIVE
RH FACTOR, EXTERNAL RESULT: POSITIVE
RUBELLA TITER, EXTERNAL RESULT: NORMAL

## 2019-12-23 ENCOUNTER — APPOINTMENT (OUTPATIENT)
Dept: GENERAL RADIOLOGY | Age: 19
End: 2019-12-23
Payer: MEDICAID

## 2019-12-23 ENCOUNTER — HOSPITAL ENCOUNTER (EMERGENCY)
Age: 19
Discharge: HOME | End: 2019-12-23
Attending: EMERGENCY MEDICINE
Payer: MEDICAID

## 2019-12-23 VITALS
HEART RATE: 97 BPM | WEIGHT: 162 LBS | TEMPERATURE: 98.2 F | OXYGEN SATURATION: 96 % | BODY MASS INDEX: 31.8 KG/M2 | SYSTOLIC BLOOD PRESSURE: 99 MMHG | DIASTOLIC BLOOD PRESSURE: 61 MMHG | RESPIRATION RATE: 16 BRPM | HEIGHT: 60 IN

## 2019-12-23 DIAGNOSIS — R11.2 NAUSEA VOMITING AND DIARRHEA: Primary | ICD-10-CM

## 2019-12-23 DIAGNOSIS — R19.7 NAUSEA VOMITING AND DIARRHEA: Primary | ICD-10-CM

## 2019-12-23 DIAGNOSIS — Z3A.28 28 WEEKS GESTATION OF PREGNANCY: ICD-10-CM

## 2019-12-23 DIAGNOSIS — N39.0 URINARY TRACT INFECTION WITHOUT HEMATURIA, SITE UNSPECIFIED: ICD-10-CM

## 2019-12-23 LAB
A/G RATIO: 1.4 (ref 1.1–2.2)
ALBUMIN SERPL-MCNC: 3.5 G/DL (ref 3.4–5)
ALP BLD-CCNC: 68 U/L (ref 40–129)
ALT SERPL-CCNC: <5 U/L (ref 10–40)
ANION GAP SERPL CALCULATED.3IONS-SCNC: 13 MMOL/L (ref 3–16)
AST SERPL-CCNC: 10 U/L (ref 15–37)
BACTERIA: ABNORMAL /HPF
BASOPHILS ABSOLUTE: 0 K/UL (ref 0–0.2)
BASOPHILS RELATIVE PERCENT: 0.3 %
BILIRUB SERPL-MCNC: 0.3 MG/DL (ref 0–1)
BILIRUBIN URINE: NEGATIVE
BLOOD, URINE: NEGATIVE
BUN BLDV-MCNC: 5 MG/DL (ref 7–20)
CALCIUM SERPL-MCNC: 8.6 MG/DL (ref 8.3–10.6)
CHLORIDE BLD-SCNC: 102 MMOL/L (ref 99–110)
CLARITY: CLEAR
CO2: 21 MMOL/L (ref 21–32)
COLOR: YELLOW
CREAT SERPL-MCNC: <0.5 MG/DL (ref 0.6–1.1)
EOSINOPHILS ABSOLUTE: 0.3 K/UL (ref 0–0.6)
EOSINOPHILS RELATIVE PERCENT: 2.5 %
EPITHELIAL CELLS, UA: ABNORMAL /HPF
GFR AFRICAN AMERICAN: >60
GFR NON-AFRICAN AMERICAN: >60
GLOBULIN: 2.5 G/DL
GLUCOSE BLD-MCNC: 98 MG/DL (ref 70–99)
GLUCOSE URINE: NEGATIVE MG/DL
HCT VFR BLD CALC: 31.4 % (ref 36–48)
HEMOGLOBIN: 11 G/DL (ref 12–16)
KETONES, URINE: NEGATIVE MG/DL
LEUKOCYTE ESTERASE, URINE: ABNORMAL
LIPASE: 21 U/L (ref 13–60)
LYMPHOCYTES ABSOLUTE: 1.9 K/UL (ref 1–5.1)
LYMPHOCYTES RELATIVE PERCENT: 17 %
MCH RBC QN AUTO: 31.2 PG (ref 26–34)
MCHC RBC AUTO-ENTMCNC: 35 G/DL (ref 31–36)
MCV RBC AUTO: 89.2 FL (ref 80–100)
MICROSCOPIC EXAMINATION: YES
MONOCYTES ABSOLUTE: 0.7 K/UL (ref 0–1.3)
MONOCYTES RELATIVE PERCENT: 6.4 %
MUCUS: ABNORMAL /LPF
NEUTROPHILS ABSOLUTE: 8.2 K/UL (ref 1.7–7.7)
NEUTROPHILS RELATIVE PERCENT: 73.8 %
NITRITE, URINE: NEGATIVE
PDW BLD-RTO: 12.7 % (ref 12.4–15.4)
PH UA: 7 (ref 5–8)
PLATELET # BLD: 247 K/UL (ref 135–450)
PMV BLD AUTO: 7.1 FL (ref 5–10.5)
POTASSIUM SERPL-SCNC: 3.5 MMOL/L (ref 3.5–5.1)
PROTEIN UA: NEGATIVE MG/DL
RAPID INFLUENZA  B AGN: NEGATIVE
RAPID INFLUENZA A AGN: NEGATIVE
RBC # BLD: 3.52 M/UL (ref 4–5.2)
RBC UA: ABNORMAL /HPF (ref 0–2)
SODIUM BLD-SCNC: 136 MMOL/L (ref 136–145)
SPECIFIC GRAVITY UA: 1.02 (ref 1–1.03)
TOTAL PROTEIN: 6 G/DL (ref 6.4–8.2)
URINE TYPE: ABNORMAL
UROBILINOGEN, URINE: 0.2 E.U./DL
WBC # BLD: 11.2 K/UL (ref 4–11)
WBC UA: ABNORMAL /HPF (ref 0–5)

## 2019-12-23 PROCEDURE — 99284 EMERGENCY DEPT VISIT MOD MDM: CPT

## 2019-12-23 PROCEDURE — 80053 COMPREHEN METABOLIC PANEL: CPT

## 2019-12-23 PROCEDURE — 87804 INFLUENZA ASSAY W/OPTIC: CPT

## 2019-12-23 PROCEDURE — 96375 TX/PRO/DX INJ NEW DRUG ADDON: CPT

## 2019-12-23 PROCEDURE — 85025 COMPLETE CBC W/AUTO DIFF WBC: CPT

## 2019-12-23 PROCEDURE — 6360000002 HC RX W HCPCS: Performed by: EMERGENCY MEDICINE

## 2019-12-23 PROCEDURE — 87086 URINE CULTURE/COLONY COUNT: CPT

## 2019-12-23 PROCEDURE — 2580000003 HC RX 258: Performed by: EMERGENCY MEDICINE

## 2019-12-23 PROCEDURE — 81001 URINALYSIS AUTO W/SCOPE: CPT

## 2019-12-23 PROCEDURE — 96365 THER/PROPH/DIAG IV INF INIT: CPT

## 2019-12-23 PROCEDURE — 83690 ASSAY OF LIPASE: CPT

## 2019-12-23 PROCEDURE — 71045 X-RAY EXAM CHEST 1 VIEW: CPT

## 2019-12-23 RX ORDER — ONDANSETRON 4 MG/1
4 TABLET, FILM COATED ORAL DAILY PRN
Qty: 30 TABLET | Refills: 0 | Status: SHIPPED | OUTPATIENT
Start: 2019-12-23 | End: 2020-02-16 | Stop reason: ALTCHOICE

## 2019-12-23 RX ORDER — ONDANSETRON 2 MG/ML
4 INJECTION INTRAMUSCULAR; INTRAVENOUS ONCE
Status: COMPLETED | OUTPATIENT
Start: 2019-12-23 | End: 2019-12-23

## 2019-12-23 RX ORDER — CEFUROXIME AXETIL 250 MG/1
250 TABLET ORAL 2 TIMES DAILY
Qty: 14 TABLET | Refills: 0 | Status: SHIPPED | OUTPATIENT
Start: 2019-12-23 | End: 2019-12-30

## 2019-12-23 RX ORDER — SODIUM CHLORIDE 9 MG/ML
1000 INJECTION, SOLUTION INTRAVENOUS CONTINUOUS
Status: DISCONTINUED | OUTPATIENT
Start: 2019-12-23 | End: 2019-12-24 | Stop reason: HOSPADM

## 2019-12-23 RX ADMIN — SODIUM CHLORIDE 1000 ML: 9 INJECTION, SOLUTION INTRAVENOUS at 21:43

## 2019-12-23 RX ADMIN — ONDANSETRON 4 MG: 2 INJECTION INTRAMUSCULAR; INTRAVENOUS at 21:42

## 2019-12-23 RX ADMIN — CEFTRIAXONE SODIUM 1 G: 1 INJECTION, POWDER, FOR SOLUTION INTRAMUSCULAR; INTRAVENOUS at 21:43

## 2019-12-23 ASSESSMENT — PAIN DESCRIPTION - FREQUENCY: FREQUENCY: CONTINUOUS

## 2019-12-23 ASSESSMENT — PAIN DESCRIPTION - ONSET: ONSET: ON-GOING

## 2019-12-23 ASSESSMENT — PAIN DESCRIPTION - PAIN TYPE: TYPE: ACUTE PAIN

## 2019-12-23 ASSESSMENT — PAIN SCALES - GENERAL: PAINLEVEL_OUTOF10: 6

## 2019-12-23 ASSESSMENT — PAIN DESCRIPTION - ORIENTATION: ORIENTATION: LEFT;LOWER

## 2019-12-23 ASSESSMENT — PAIN DESCRIPTION - LOCATION: LOCATION: ABDOMEN

## 2019-12-23 ASSESSMENT — PAIN DESCRIPTION - DESCRIPTORS: DESCRIPTORS: CRAMPING

## 2019-12-25 LAB — URINE CULTURE, ROUTINE: NORMAL

## 2019-12-31 ENCOUNTER — HOSPITAL ENCOUNTER (OUTPATIENT)
Age: 19
Discharge: HOME | End: 2019-12-31
Attending: OBSTETRICS & GYNECOLOGY | Admitting: OBSTETRICS & GYNECOLOGY
Payer: MEDICAID

## 2019-12-31 VITALS
HEART RATE: 120 BPM | SYSTOLIC BLOOD PRESSURE: 120 MMHG | RESPIRATION RATE: 18 BRPM | WEIGHT: 166 LBS | DIASTOLIC BLOOD PRESSURE: 64 MMHG | HEIGHT: 60 IN | BODY MASS INDEX: 32.59 KG/M2 | TEMPERATURE: 98.2 F

## 2019-12-31 PROCEDURE — 99211 OFF/OP EST MAY X REQ PHY/QHP: CPT

## 2019-12-31 NOTE — PROGRESS NOTES
Dr. Marielle Aguilar stated it was ok for the patient to take any tylenol cold and flu products for her respiratory symptoms.

## 2019-12-31 NOTE — PROGRESS NOTES
Pt presents to triage with c/o decreased fetal movement, and flu like symptoms for the past 2 days. No recent intercourse, denies LOF, reports she \"hasn't felt the baby move for 2 days since I got sick\".

## 2019-12-31 NOTE — PROGRESS NOTES
Pt verbalized understanding of verbal and written discharge instructions and denies having questions at this time. Pt left OB unit at 1424 ambulatory, undelivered, and in stable condition. Patient is not in active labor.

## 2019-12-31 NOTE — H&P
Department of Obstetrics and Gynecology  Labor and Delivery  Attending Triage Note      SUBJECTIVE:  Presents c/o of decreased fetal movement for 2 days, denies LOF or VB. Since arriving in triage the fetus has started moving. OBJECTIVE    Vitals:  /64   Pulse 120   Temp 98.2 °F (36.8 °C) (Oral)   Resp 18   Ht 5' (1.524 m)   Wt 166 lb (75.3 kg)   LMP 2019   BMI 32.42 kg/m²     CONSTITUTIONAL:  awake, alert, cooperative, no apparent distress, and appears stated age  ABDOMEN:  Gravid, soft, NT, ND  SKIN:  normal skin color, texture, turgor    Membranes:  Intact    Fetal heart rate:         Baseline Heart Rate:  135        Accelerations:  present       Variability:  moderate    Contraction frequency:         DATA:      ASSESSMENT & PLAN:      23 yo  @ 29w6d  1. NST reactive  2. Reassurance given  3. Follow-up in the office as scheduled    Active Problems:    * No active hospital problems.  *

## 2020-02-03 LAB
C. TRACHOMATIS, EXTERNAL RESULT: NEGATIVE
GBS, EXTERNAL RESULT: POSITIVE
N. GONORRHOEAE, EXTERNAL RESULT: NEGATIVE
RPR, EXTERNAL RESULT: NON REACTIVE

## 2020-02-16 ENCOUNTER — APPOINTMENT (OUTPATIENT)
Dept: GENERAL RADIOLOGY | Age: 20
End: 2020-02-16
Payer: MEDICAID

## 2020-02-16 ENCOUNTER — HOSPITAL ENCOUNTER (OUTPATIENT)
Age: 20
Setting detail: OBSERVATION
Discharge: HOME | End: 2020-02-18
Attending: EMERGENCY MEDICINE | Admitting: OBSTETRICS & GYNECOLOGY
Payer: MEDICAID

## 2020-02-16 ENCOUNTER — HOSPITAL ENCOUNTER (INPATIENT)
Age: 20
End: 2020-02-16
Attending: OBSTETRICS & GYNECOLOGY | Admitting: OBSTETRICS & GYNECOLOGY
Payer: MEDICAID

## 2020-02-16 PROBLEM — R00.0 TACHYCARDIA: Status: ACTIVE | Noted: 2020-02-16

## 2020-02-16 PROBLEM — J11.1 FLU: Status: ACTIVE | Noted: 2020-02-16

## 2020-02-16 LAB
ANION GAP SERPL CALCULATED.3IONS-SCNC: 10 MMOL/L (ref 3–16)
ANION GAP SERPL CALCULATED.3IONS-SCNC: 15 MMOL/L (ref 3–16)
BASOPHILS ABSOLUTE: 0 K/UL (ref 0–0.2)
BASOPHILS RELATIVE PERCENT: 0.4 %
BUN BLDV-MCNC: 3 MG/DL (ref 7–20)
BUN BLDV-MCNC: 4 MG/DL (ref 7–20)
CALCIUM SERPL-MCNC: 8.1 MG/DL (ref 8.3–10.6)
CALCIUM SERPL-MCNC: 8.8 MG/DL (ref 8.3–10.6)
CHLORIDE BLD-SCNC: 101 MMOL/L (ref 99–110)
CHLORIDE BLD-SCNC: 105 MMOL/L (ref 99–110)
CO2: 19 MMOL/L (ref 21–32)
CO2: 20 MMOL/L (ref 21–32)
CREAT SERPL-MCNC: <0.5 MG/DL (ref 0.6–1.1)
CREAT SERPL-MCNC: <0.5 MG/DL (ref 0.6–1.1)
EKG ATRIAL RATE: 147 BPM
EKG DIAGNOSIS: NORMAL
EKG P AXIS: 60 DEGREES
EKG P-R INTERVAL: 144 MS
EKG Q-T INTERVAL: 338 MS
EKG QRS DURATION: 66 MS
EKG QTC CALCULATION (BAZETT): 528 MS
EKG R AXIS: 43 DEGREES
EKG T AXIS: 52 DEGREES
EKG VENTRICULAR RATE: 147 BPM
EOSINOPHILS ABSOLUTE: 0.1 K/UL (ref 0–0.6)
EOSINOPHILS RELATIVE PERCENT: 1 %
GFR AFRICAN AMERICAN: >60
GFR AFRICAN AMERICAN: >60
GFR NON-AFRICAN AMERICAN: >60
GFR NON-AFRICAN AMERICAN: >60
GLUCOSE BLD-MCNC: 82 MG/DL (ref 70–99)
GLUCOSE BLD-MCNC: 89 MG/DL (ref 70–99)
HCT VFR BLD CALC: 25.7 % (ref 36–48)
HCT VFR BLD CALC: 30.2 % (ref 36–48)
HEMOGLOBIN: 10.8 G/DL (ref 12–16)
HEMOGLOBIN: 8.8 G/DL (ref 12–16)
LYMPHOCYTES ABSOLUTE: 0.5 K/UL (ref 1–5.1)
LYMPHOCYTES RELATIVE PERCENT: 5.1 %
MAGNESIUM: 1.4 MG/DL (ref 1.8–2.4)
MCH RBC QN AUTO: 30.4 PG (ref 26–34)
MCH RBC QN AUTO: 31.1 PG (ref 26–34)
MCHC RBC AUTO-ENTMCNC: 34.1 G/DL (ref 31–36)
MCHC RBC AUTO-ENTMCNC: 35.8 G/DL (ref 31–36)
MCV RBC AUTO: 86.8 FL (ref 80–100)
MCV RBC AUTO: 89.2 FL (ref 80–100)
MONOCYTES ABSOLUTE: 1 K/UL (ref 0–1.3)
MONOCYTES RELATIVE PERCENT: 9.9 %
NEUTROPHILS ABSOLUTE: 8.5 K/UL (ref 1.7–7.7)
NEUTROPHILS RELATIVE PERCENT: 83.6 %
PDW BLD-RTO: 12.6 % (ref 12.4–15.4)
PDW BLD-RTO: 12.9 % (ref 12.4–15.4)
PHOSPHORUS: 3 MG/DL (ref 2.5–4.9)
PLATELET # BLD: 183 K/UL (ref 135–450)
PLATELET # BLD: 211 K/UL (ref 135–450)
PMV BLD AUTO: 7 FL (ref 5–10.5)
PMV BLD AUTO: 7.3 FL (ref 5–10.5)
POTASSIUM SERPL-SCNC: 3.2 MMOL/L (ref 3.5–5.1)
POTASSIUM SERPL-SCNC: 3.5 MMOL/L (ref 3.5–5.1)
PROCALCITONIN: 0.11 NG/ML (ref 0–0.15)
RAPID INFLUENZA  B AGN: POSITIVE
RAPID INFLUENZA A AGN: NEGATIVE
RBC # BLD: 2.88 M/UL (ref 4–5.2)
RBC # BLD: 3.48 M/UL (ref 4–5.2)
SODIUM BLD-SCNC: 134 MMOL/L (ref 136–145)
SODIUM BLD-SCNC: 136 MMOL/L (ref 136–145)
SPECIMEN STATUS: NORMAL
WBC # BLD: 10.1 K/UL (ref 4–11)
WBC # BLD: 6 K/UL (ref 4–11)

## 2020-02-16 PROCEDURE — 6360000002 HC RX W HCPCS: Performed by: INTERNAL MEDICINE

## 2020-02-16 PROCEDURE — 94640 AIRWAY INHALATION TREATMENT: CPT

## 2020-02-16 PROCEDURE — 6360000002 HC RX W HCPCS: Performed by: OBSTETRICS & GYNECOLOGY

## 2020-02-16 PROCEDURE — 80048 BASIC METABOLIC PNL TOTAL CA: CPT

## 2020-02-16 PROCEDURE — 84145 PROCALCITONIN (PCT): CPT

## 2020-02-16 PROCEDURE — 96376 TX/PRO/DX INJ SAME DRUG ADON: CPT

## 2020-02-16 PROCEDURE — 85025 COMPLETE CBC W/AUTO DIFF WBC: CPT

## 2020-02-16 PROCEDURE — 96375 TX/PRO/DX INJ NEW DRUG ADDON: CPT

## 2020-02-16 PROCEDURE — 87804 INFLUENZA ASSAY W/OPTIC: CPT

## 2020-02-16 PROCEDURE — G0378 HOSPITAL OBSERVATION PER HR: HCPCS

## 2020-02-16 PROCEDURE — 96361 HYDRATE IV INFUSION ADD-ON: CPT

## 2020-02-16 PROCEDURE — 96365 THER/PROPH/DIAG IV INF INIT: CPT

## 2020-02-16 PROCEDURE — 2580000003 HC RX 258: Performed by: INTERNAL MEDICINE

## 2020-02-16 PROCEDURE — 2580000003 HC RX 258: Performed by: EMERGENCY MEDICINE

## 2020-02-16 PROCEDURE — 2580000003 HC RX 258: Performed by: OBSTETRICS & GYNECOLOGY

## 2020-02-16 PROCEDURE — 6370000000 HC RX 637 (ALT 250 FOR IP): Performed by: EMERGENCY MEDICINE

## 2020-02-16 PROCEDURE — 59025 FETAL NON-STRESS TEST: CPT

## 2020-02-16 PROCEDURE — 93005 ELECTROCARDIOGRAM TRACING: CPT | Performed by: INTERNAL MEDICINE

## 2020-02-16 PROCEDURE — 96372 THER/PROPH/DIAG INJ SC/IM: CPT

## 2020-02-16 PROCEDURE — 6360000002 HC RX W HCPCS: Performed by: EMERGENCY MEDICINE

## 2020-02-16 PROCEDURE — 6370000000 HC RX 637 (ALT 250 FOR IP): Performed by: OBSTETRICS & GYNECOLOGY

## 2020-02-16 PROCEDURE — 84100 ASSAY OF PHOSPHORUS: CPT

## 2020-02-16 PROCEDURE — 93010 ELECTROCARDIOGRAM REPORT: CPT | Performed by: INTERNAL MEDICINE

## 2020-02-16 PROCEDURE — 99285 EMERGENCY DEPT VISIT HI MDM: CPT

## 2020-02-16 PROCEDURE — 71045 X-RAY EXAM CHEST 1 VIEW: CPT

## 2020-02-16 PROCEDURE — 6360000002 HC RX W HCPCS: Performed by: NURSE PRACTITIONER

## 2020-02-16 PROCEDURE — 96366 THER/PROPH/DIAG IV INF ADDON: CPT

## 2020-02-16 PROCEDURE — 83735 ASSAY OF MAGNESIUM: CPT

## 2020-02-16 PROCEDURE — 93005 ELECTROCARDIOGRAM TRACING: CPT | Performed by: EMERGENCY MEDICINE

## 2020-02-16 PROCEDURE — 6370000000 HC RX 637 (ALT 250 FOR IP): Performed by: NURSE PRACTITIONER

## 2020-02-16 PROCEDURE — 36415 COLL VENOUS BLD VENIPUNCTURE: CPT

## 2020-02-16 PROCEDURE — 85027 COMPLETE CBC AUTOMATED: CPT

## 2020-02-16 RX ORDER — OSELTAMIVIR PHOSPHATE 75 MG/1
75 CAPSULE ORAL ONCE
Status: COMPLETED | OUTPATIENT
Start: 2020-02-16 | End: 2020-02-16

## 2020-02-16 RX ORDER — OSELTAMIVIR PHOSPHATE 75 MG/1
75 CAPSULE ORAL 2 TIMES DAILY
Status: DISCONTINUED | OUTPATIENT
Start: 2020-02-16 | End: 2020-02-18 | Stop reason: HOSPADM

## 2020-02-16 RX ORDER — ACETAMINOPHEN 500 MG
1000 TABLET ORAL ONCE
Status: COMPLETED | OUTPATIENT
Start: 2020-02-16 | End: 2020-02-16

## 2020-02-16 RX ORDER — ALBUTEROL SULFATE 2.5 MG/3ML
2.5 SOLUTION RESPIRATORY (INHALATION) EVERY 4 HOURS PRN
Status: DISCONTINUED | OUTPATIENT
Start: 2020-02-16 | End: 2020-02-16

## 2020-02-16 RX ORDER — ONDANSETRON 2 MG/ML
4 INJECTION INTRAMUSCULAR; INTRAVENOUS EVERY 6 HOURS PRN
Status: DISCONTINUED | OUTPATIENT
Start: 2020-02-16 | End: 2020-02-18 | Stop reason: HOSPADM

## 2020-02-16 RX ORDER — SODIUM CHLORIDE 9 MG/ML
INJECTION, SOLUTION INTRAVENOUS
Status: DISPENSED
Start: 2020-02-16 | End: 2020-02-17

## 2020-02-16 RX ORDER — PRENATAL WITH FERROUS FUM AND FOLIC ACID 3080; 920; 120; 400; 22; 1.84; 3; 20; 10; 1; 12; 200; 27; 25; 2 [IU]/1; [IU]/1; MG/1; [IU]/1; MG/1; MG/1; MG/1; MG/1; MG/1; MG/1; UG/1; MG/1; MG/1; MG/1; MG/1
1 TABLET ORAL DAILY
Status: DISCONTINUED | OUTPATIENT
Start: 2020-02-16 | End: 2020-02-18 | Stop reason: HOSPADM

## 2020-02-16 RX ORDER — 0.9 % SODIUM CHLORIDE 0.9 %
1000 INTRAVENOUS SOLUTION INTRAVENOUS ONCE
Status: COMPLETED | OUTPATIENT
Start: 2020-02-16 | End: 2020-02-16

## 2020-02-16 RX ORDER — ALBUTEROL SULFATE 2.5 MG/3ML
2.5 SOLUTION RESPIRATORY (INHALATION) EVERY 4 HOURS PRN
Status: DISCONTINUED | OUTPATIENT
Start: 2020-02-16 | End: 2020-02-18 | Stop reason: HOSPADM

## 2020-02-16 RX ORDER — SODIUM CHLORIDE 0.9 % (FLUSH) 0.9 %
10 SYRINGE (ML) INJECTION EVERY 12 HOURS SCHEDULED
Status: DISCONTINUED | OUTPATIENT
Start: 2020-02-16 | End: 2020-02-18 | Stop reason: HOSPADM

## 2020-02-16 RX ORDER — FAMOTIDINE 10 MG
10 TABLET ORAL 2 TIMES DAILY PRN
COMMUNITY

## 2020-02-16 RX ORDER — ACETAMINOPHEN 325 MG/1
650 TABLET ORAL EVERY 4 HOURS PRN
Status: DISCONTINUED | OUTPATIENT
Start: 2020-02-16 | End: 2020-02-18 | Stop reason: HOSPADM

## 2020-02-16 RX ORDER — 0.9 % SODIUM CHLORIDE 0.9 %
500 INTRAVENOUS SOLUTION INTRAVENOUS ONCE
Status: COMPLETED | OUTPATIENT
Start: 2020-02-16 | End: 2020-02-16

## 2020-02-16 RX ORDER — POTASSIUM CHLORIDE 20 MEQ/1
40 TABLET, EXTENDED RELEASE ORAL ONCE
Status: COMPLETED | OUTPATIENT
Start: 2020-02-16 | End: 2020-02-16

## 2020-02-16 RX ORDER — SODIUM CHLORIDE 0.9 % (FLUSH) 0.9 %
10 SYRINGE (ML) INJECTION PRN
Status: DISCONTINUED | OUTPATIENT
Start: 2020-02-16 | End: 2020-02-18 | Stop reason: HOSPADM

## 2020-02-16 RX ORDER — MAGNESIUM SULFATE IN WATER 40 MG/ML
4 INJECTION, SOLUTION INTRAVENOUS ONCE
Status: COMPLETED | OUTPATIENT
Start: 2020-02-16 | End: 2020-02-17

## 2020-02-16 RX ORDER — SODIUM CHLORIDE 9 MG/ML
INJECTION, SOLUTION INTRAVENOUS CONTINUOUS
Status: DISCONTINUED | OUTPATIENT
Start: 2020-02-16 | End: 2020-02-18 | Stop reason: HOSPADM

## 2020-02-16 RX ADMIN — SODIUM CHLORIDE 500 ML: 9 INJECTION, SOLUTION INTRAVENOUS at 06:09

## 2020-02-16 RX ADMIN — ENOXAPARIN SODIUM 40 MG: 40 INJECTION SUBCUTANEOUS at 21:13

## 2020-02-16 RX ADMIN — ACETAMINOPHEN 650 MG: 325 TABLET ORAL at 17:01

## 2020-02-16 RX ADMIN — ALBUTEROL SULFATE 5 MG: 2.5 SOLUTION RESPIRATORY (INHALATION) at 04:42

## 2020-02-16 RX ADMIN — ACETAMINOPHEN 650 MG: 325 TABLET ORAL at 10:37

## 2020-02-16 RX ADMIN — OSELTAMIVIR PHOSPHATE 75 MG: 75 CAPSULE ORAL at 19:52

## 2020-02-16 RX ADMIN — ACETAMINOPHEN 1000 MG: 500 TABLET ORAL at 05:42

## 2020-02-16 RX ADMIN — SODIUM CHLORIDE 1000 ML: 9 INJECTION, SOLUTION INTRAVENOUS at 04:22

## 2020-02-16 RX ADMIN — POTASSIUM CHLORIDE 40 MEQ: 20 TABLET, EXTENDED RELEASE ORAL at 19:52

## 2020-02-16 RX ADMIN — ONDANSETRON 4 MG: 2 INJECTION INTRAMUSCULAR; INTRAVENOUS at 07:05

## 2020-02-16 RX ADMIN — MAGNESIUM SULFATE HEPTAHYDRATE 4 G: 40 INJECTION, SOLUTION INTRAVENOUS at 19:52

## 2020-02-16 RX ADMIN — SODIUM CHLORIDE: 9 INJECTION, SOLUTION INTRAVENOUS at 18:20

## 2020-02-16 RX ADMIN — ALBUTEROL SULFATE 2.5 MG: 2.5 SOLUTION RESPIRATORY (INHALATION) at 11:26

## 2020-02-16 RX ADMIN — ALBUTEROL SULFATE 2.5 MG: 2.5 SOLUTION RESPIRATORY (INHALATION) at 20:00

## 2020-02-16 RX ADMIN — ONDANSETRON 4 MG: 2 INJECTION INTRAMUSCULAR; INTRAVENOUS at 19:58

## 2020-02-16 RX ADMIN — SODIUM CHLORIDE: 9 INJECTION, SOLUTION INTRAVENOUS at 23:00

## 2020-02-16 RX ADMIN — ACETAMINOPHEN 650 MG: 325 TABLET ORAL at 22:57

## 2020-02-16 RX ADMIN — SODIUM CHLORIDE 1000 ML: 9 INJECTION, SOLUTION INTRAVENOUS at 09:02

## 2020-02-16 RX ADMIN — SODIUM CHLORIDE 1000 ML: 9 INJECTION, SOLUTION INTRAVENOUS at 17:55

## 2020-02-16 RX ADMIN — OSELTAMIVIR PHOSPHATE 75 MG: 75 CAPSULE ORAL at 05:42

## 2020-02-16 RX ADMIN — Medication 10 ML: at 08:55

## 2020-02-16 RX ADMIN — SODIUM CHLORIDE 1000 ML: 9 INJECTION, SOLUTION INTRAVENOUS at 13:23

## 2020-02-16 ASSESSMENT — PAIN DESCRIPTION - PROGRESSION: CLINICAL_PROGRESSION: GRADUALLY WORSENING

## 2020-02-16 ASSESSMENT — PAIN SCALES - GENERAL
PAINLEVEL_OUTOF10: 8
PAINLEVEL_OUTOF10: 6
PAINLEVEL_OUTOF10: 7
PAINLEVEL_OUTOF10: 6

## 2020-02-16 ASSESSMENT — PAIN DESCRIPTION - FREQUENCY: FREQUENCY: CONTINUOUS

## 2020-02-16 ASSESSMENT — PAIN DESCRIPTION - PAIN TYPE
TYPE: ACUTE PAIN
TYPE: ACUTE PAIN

## 2020-02-16 ASSESSMENT — PAIN DESCRIPTION - ONSET: ONSET: ON-GOING

## 2020-02-16 ASSESSMENT — PAIN DESCRIPTION - DESCRIPTORS: DESCRIPTORS: ACHING

## 2020-02-16 ASSESSMENT — PAIN DESCRIPTION - LOCATION: LOCATION: ANKLE;HIP;BACK

## 2020-02-16 NOTE — ED NOTES
[address] at [address] [facility] per Dr. Abhay Mendenhall spoke with Dr. Abhay Oliveira at [address]  02/16/20 3919

## 2020-02-16 NOTE — PROGRESS NOTES
Dr. Day Pérez updated with patient lab results, FHR and contraction pattern. Order for SVE by this RN received.

## 2020-02-16 NOTE — PROGRESS NOTES
Patient c/o SOB. HX asthma and stated \"feels like my asthma is acting up\". Patient states she uses albuterol nebulizer at home. C/O chest pain with deep breaths. Lungs CTA. Notified Dr. Zach Sawyer, waiting on response.

## 2020-02-16 NOTE — PROGRESS NOTES
4 Eyes Skin Assessment     The patient is being assess for  Admission    I agree that 2 RN's have performed a thorough Head to Toe Skin Assessment on the patient. ALL assessment sites listed below have been assessed. Areas assessed by both nurses: Ele Reyes RN & Sallie Astorga RN  [x]   Head, Face, and Ears   [x]   Shoulders, Back, and Chest  [x]   Arms, Elbows, and Hands   [x]   Coccyx, Sacrum, and IschIum  [x]   Legs, Feet, and Heels        Does the Patient have Skin Breakdown?   No         Fabio Prevention initiated:  NA   Wound Care Orders initiated:  NA      WOC nurse consulted for Pressure Injury (Stage 3,4, Unstageable, DTI, NWPT, and Complex wounds), New and Established Ostomies:  NA      Nurse 1 eSignature: Electronically signed by Liang Perez RN on 2/16/20 at 6:59 PM    **SHARE this note so that the co-signing nurse is able to place an eSignature**    Nurse 2 eSignature: {Esignature:[id number]}

## 2020-02-16 NOTE — ED NOTES
Writer spoke with [facility], states they will bring monitor down to assess heart tones.      Gasper West RN  02/16/20 6601

## 2020-02-16 NOTE — CONSULTS
Hospital Medicine  Consult History & Physical        Chief Complaint:  Cough, shortness of breath     Date of Service: Pt seen/examined in consultation on 02/16/20     History Of Present Illness:      22 y.o. female who we are asked to see/evaluate by Flaquita Galicia MD for medical management of influenza B, sinus tachycardia  Last trimester pregnant patient admitted with influenza  Tachycardia noted. No chest pain, no shortness of breath. Admitted by obstetrics service because of viable pregnancy. Past Medical History:        Diagnosis Date    ADHD (attention deficit hyperactivity disorder)     Asthma     Chlamydia infection 09/22/2019       Past Surgical History:        Procedure Laterality Date    TUMOR REMOVAL  03/18/2019    left side of throat        Medications Prior to Admission:    Prior to Admission medications    Medication Sig Start Date End Date Taking? Authorizing Provider   famotidine (PEPCID) 10 MG tablet Take 10 mg by mouth 2 times daily as needed   Yes Historical Provider, MD   Prenatal Vit-Fe Fumarate-FA (PRENATAL VITAMINS) 28-0.8 MG TABS Take 1 tablet by mouth daily 7/10/19  Yes DEEP Lara - CNP       Allergies:  Patient has no known allergies. Social History:      The patient currently lives at home    TOBACCO:   reports that she has been smoking cigarettes. She has been smoking about 0.25 packs per day. She has never used smokeless tobacco.  ETOH:   reports no history of alcohol use. Family History:     Reviewed in detail and negative for DM, CAD, Cancer, CVA. Positive as follows:    History reviewed. No pertinent family history. REVIEW OF SYSTEMS:   Pertinent positives as noted in the HPI. Cough, weakness. All other systems reviewed and negative.     PHYSICAL EXAM PERFORMED:  BP (!) 96/54   Pulse 156   Temp 98.8 °F (37.1 °C) (Oral)   Resp 24   Ht 5' (1.524 m)   Wt 172 lb (78 kg)   LMP 04/21/2019   SpO2 95%   BMI 33.59 kg/m²   General appearance: No apparent distress, appears stated age and cooperative. HEENT: Normal cephalic, atraumatic without obvious deformity. Pupils equal, round, and reactive to light. Extra ocular muscles intact. Conjunctivae/corneas clear. Neck: Supple, with full range of motion. No jugular venous distention. Trachea midline. Respiratory:  Normal respiratory effort. Few rhonchi, no crackles or wheezes. Cardiovascular: Tachycardic, regular rate and rhythm with normal S1/S2 without murmurs, rubs or gallops. Abdomen: Soft, non-tender, non-distended with normal bowel sounds. Pregnant patient  Musculoskeletal: No clubbing, cyanosis or edema bilaterally. Skin: Skin color, texture, turgor normal.  No rashes or lesions. Neurologic:  Neurovascularly intact without any focal sensory/motor deficits. Cranial nerves: II-XII intact, grossly non-focal.  Psychiatric: Alert and oriented, thought content appropriate, normal insight  Capillary Refill: Brisk,< 3 seconds   Peripheral Pulses: +2 palpable, equal bilaterally     Labs:     Recent Labs     02/16/20  0338   WBC 10.1   HGB 10.8*   HCT 30.2*        Recent Labs     02/16/20  0338      K 3.5      CO2 20*   BUN 4*   CREATININE <0.5*   CALCIUM 8.8     No results for input(s): AST, ALT, BILIDIR, BILITOT, ALKPHOS in the last 72 hours. No results for input(s): INR in the last 72 hours. No results for input(s): Shaila Arian in the last 72 hours.     Urinalysis:    Lab Results   Component Value Date    NITRU Negative 12/23/2019    WBCUA 6-10 12/23/2019    BACTERIA 1+ 12/23/2019    RBCUA 0-2 12/23/2019    BLOODU Negative 12/23/2019    SPECGRAV 1.020 12/23/2019    GLUCOSEU Negative 12/23/2019       Radiology: I have reviewed the radiology reports with the following interpretation:     No orders to display          EKG:  I have reviewed the EKG with the following interpretation:    Sinus tachycardia      ASSESSMENT:    Active Hospital Problems    Diagnosis Date Noted    Flu [J11.1] 02/16/2020    Tachycardia [R00.0] 02/16/2020       PLAN:    Influenza B  Continue Tamiflu to complete 5-day course. No signs of viral pneumonia, patient is not septic, not in septic shock. Sinus tachycardia  Most likely secondary to acute illness and dehydration  As sinus tachycardia is a reactive phenomenon, direct treatment to reduce heart rate is not indicated. In this case, it could be contraindicated. We will proceed with normal saline boluses. Blood pressure is low but acceptable for a young woman with no medical conditions otherwise. If hypotension becomes severe and intractable, patient may need pressors. This does not seem to be likely. Last trimester pregnancy  Primary service to follow and assess. DVT Prophylaxis: per primary  Diet: DIET GENERAL;  Code Status: Full Code    PT/OT Eval Status: Active and ongoing    Dispo - to be decided by primary. Observation stay for now.     Thank you for the consultation, will follow up as needed    Livan Uriarte MD

## 2020-02-16 NOTE — PROGRESS NOTES
Called to ED to evaluate patient being seen for r/o flu. Patient is alert, oriented and c/o chest pain/pressure when coughing. Denies vaginal bleeding, denies LOF and reports +FM. Symptoms started 02/15/2020 around 1600 and progressed through the evening. Patient is accompanied by FOB.

## 2020-02-16 NOTE — ED PROVIDER NOTES
[address] [facility]  ED PROVIDER NOTE    Patient Identification  Pt Name: Tate Kelly  MRN: [id number]  Steffengfsha 2000  Date of evaluation: 2/16/2020  Provider: Nery Morillo MD  PCP: No primary care provider on file. Chief Complaint  Illness (Patient reports fever, chills , body aches, nausea no vomiting. Patient reports all day and getting worse. Patient is 37 weeks pregnant)      HPI  History provided by patient   This is a 22 y.o. female who presents to the ED for fever, chills, body aches. Ongoing for about 24 hours. No chest pain or shortness of breath. Has occasional cough. Is 37 weeks pregnant. Has no abdominal pain. Does have lower back pressure. Is having contractions that do not last greater than a minute. Last contraction was at 10 PM.  Had about 4 contractions today symptoms are moderate intensity. ROS  10 systems reviewed, pertinent positives/negatives per HPI otherwise noted to be negative. I have reviewed the following nursing documentation:  Allergies: Patient has no known allergies. Past medical history:   Past Medical History:   Diagnosis Date    ADHD (attention deficit hyperactivity disorder)     Asthma     Chlamydia infection 09/22/2019     Past surgical history:   Past Surgical History:   Procedure Laterality Date    TUMOR REMOVAL  03/18/2019    left side of throat        Home medications:   Previous Medications    PRENATAL VIT-FE FUMARATE-FA (PRENATAL VITAMINS) 28-0.8 MG TABS    Take 1 tablet by mouth daily       Social history:  reports that she has been smoking cigarettes. She has been smoking about 0.25 packs per day. She has never used smokeless tobacco. She reports previous drug use. Drug: Marijuana. She reports that she does not drink alcohol. Family history:  History reviewed. No pertinent family history.       Exam  ED Triage Vitals [02/16/20 0307]   BP Temp Temp Source Pulse Resp SpO2 Height Weight   114/60 99.5 °F (37.5 °C) Oral 146 16 96 % 5' Sodium 136 136 - 145 mmol/L    Potassium 3.5 3.5 - 5.1 mmol/L    Chloride 101 99 - 110 mmol/L    CO2 20 (L) 21 - 32 mmol/L    Anion Gap 15 3 - 16    Glucose 89 70 - 99 mg/dL    BUN 4 (L) 7 - 20 mg/dL    CREATININE <0.5 (L) 0.6 - 1.1 mg/dL    GFR Non-African American >60 >60    GFR African American >60 >60    Calcium 8.8 8.3 - 10.6 mg/dL       Screenings           MDM and ED Course  Patient is a 78-year-old woman who presents 37 weeks pregnant with flulike symptoms. Found to have influenza B. Will treat with Tamiflu. Given IV fluids however persistently tachycardic to the 120s to the 130s. Lower suspicion for pulmonary embolism given better alternate explanation for her symptoms. Is having contractions 4 times a day lasting for less than a minute at a time, likely Alize Ropes in etiology. Will admit to OB/GYN service given persistent tachycardia . (EMP MDM)    [unfilled]    Final Impression  1. Influenza with respiratory manifestation other than pneumonia    2. Complication of pregnancy, antepartum        Blood pressure (!) 106/46, pulse 138, temperature 99.5 °F (37.5 °C), temperature source Oral, resp. rate 19, height 5' (1.524 m), weight 172 lb (78 kg), last menstrual period 04/21/2019, SpO2 96 %, not currently breastfeeding. Disposition:  DISPOSITION Decision To Admit 02/16/2020 04:54:53 AM      Patient Referrals:  No follow-up provider specified. Discharge Medications:  New Prescriptions    No medications on file       Discontinued Medications:  Discontinued Medications    ALBUTEROL SULFATE HFA (PROVENTIL HFA) 108 (90 BASE) MCG/ACT INHALER    Inhale 2 puffs into the lungs every 4 hours as needed for Wheezing or Shortness of Breath (Space out to every 6 hours as symptoms improve) Space out to every 6 hours as symptoms improve. METOCLOPRAMIDE (REGLAN) 10 MG TABLET    Take 1 tablet by mouth 4 times daily WARNING:  May cause drowsiness. May impair ability to operate vehicles or machinery.   Do not use in combination with alcohol. ONDANSETRON (ZOFRAN) 4 MG TABLET    Take 1 tablet by mouth daily as needed for Nausea or Vomiting       This chart was generated using the [employer] dictation system. I created this record but it may contain dictation errors given the limitations of this technology.         Agnes Elias MD  02/16/20 1578

## 2020-02-16 NOTE — PROGRESS NOTES
5. Bronchodilators will be delivered via Metered Dose Inhaler (MDI), HHN, Aerogen to intubated patients on mechanical ventilation. 6. Inhalation medication orders will be delivered and/or substituted as outlined below. Aerosolized Medications Ordering and Administration Guidelines:    1. All Medications will be ordered by a physician, and their frequency and/or modality will be adjusted as defined by the patients Respiratory Severity Index (RSI) score. 2. If the patient does not have documented COPD, consider discontinuing anticholinergics when RSI is less than 9.  3. If the bronchospasm worsens (increased RSI), then the bronchodilator frequency can be increased to a maximum of every 4 hours. If greater than every 4 hours is required, the physician will be contacted. 4. If the bronchospasm improves, the frequency of the bronchodilator can be decreased, based on the patient's RSI, but not less than home treatment regimen frequency. 5. Bronchodilator(s) will be discontinued if patient has a RSI less than 9 and has received no scheduled or as needed treatment for 72  Hrs. Patients Ordered on a Mucolytic Agent:    1. Must always be administered with a bronchodilator. 2. Discontinue if patient experiences worsened bronchospasm, or secretions have lessened to the point that the patient is able to clear them with a cough. Anti-inflammatory and Combination Medications:    1. If the patient lacks prior history of lung disease, is not using inhaled anti-inflammatory medication at home, and lacks wheezing by examination or by history for at least 24 hours, contact physician for possible discontinuation.

## 2020-02-16 NOTE — ED NOTES
Called [facility] at [facility] [address] per Dr. Monty Petit spoke with Dr. Monty Jacobo at [facility]  02/16/20 0715

## 2020-02-16 NOTE — PROGRESS NOTES
Pt admitted to room C339 from ED via wheelchair. Pt alert and oriented. Pt MEWS 6 d/t HR 150s, BP 96/54, RR 24-30. Pt c/o slightly worsening abd cramping since coming to the ED; Clinical and Charge RN aware. Clinical notified OBGYN Charge as well. Spoke with Dr. Candy Arias; no new orders at this time. Pt denies needs at this time. Will continue to monitor.

## 2020-02-16 NOTE — H&P
Department of Obstetrics and Gynecology  Attending Obstetrics History and Physical        CHIEF COMPLAINT:  Respiratory symptoms    HISTORY OF PRESENT ILLNESS:      The patient is a 22 y.o.  3 parity 56 at 39 4/7 weeks. Patient presents with a chief complaint as above and is being admitted for influenza b with tachycardia. DATES:    Last Menstrual Period:    Estimated Due Date:  3/11/2020    PRENATAL CARE:    Provider:  Rehan Turner MD    Blood Type/Rh:  B+  Antibody Screen:  neg  Rubella:  Imm  RPR:  nr  Hepatitis B Surface Antigen: nr  HIV:  nr  Gonorrhea:  neg  Chlamydia:  Pos, JASSI neg  1 hour Glucose Tolerance Test:    Group B Strep:        PAST OB HISTORY        Depression:  No      Post-partum depression:  No      Diabetes:  No      Gestational Diabetes:  No      Thyroid Disease:  No      Chronic HTN:  No      Gestation HTN:  No      Pre-eclampsia:  No      Seizure disorder:  No      Asthma:  No      Clotting disorder:  No      :  No      Tubal ligation:  No      D & C:  No      Cerclage:  No      LEEP:  No      Myomectomy:  No    OB History    Para Term  AB Living   3       2     SAB TAB Ectopic Molar Multiple Live Births   2                # Outcome Date GA Lbr Micha/2nd Weight Sex Delivery Anes PTL Lv   3 Current            2 SAB 2019           1 SAB 2018             Past Gynecological History:      Menarche:    Last menstrual period:  Patient's last menstrual period was 2019. History of uterine fibroids:  No  History of endometriosis:  No  Pap History:    Sexually transmitted disease history: chlamydia    Past Medical History:        Diagnosis Date    ADHD (attention deficit hyperactivity disorder)     Asthma     Chlamydia infection 2019     Past Surgical History:        Procedure Laterality Date    TUMOR REMOVAL  2019    left side of throat      Social History:      Family History:   History reviewed. No pertinent family history.   Medications

## 2020-02-16 NOTE — ED NOTES
Fetal Heart tones assessed at this time. Adrianna Wang MD at bedside assessing patient at this time.       Shravan Pearsoner, [address]  02/16/20 4719

## 2020-02-16 NOTE — FLOWSHEET NOTE
This RN to C339 to apply EFM. Pt states cramping is less now since IV fluids. FHR baseline 145 to 150, moderate variability noted. Pt states feels fetal movement. Will reassess FHR tracing in 30 minutes. Pt aware of same and voices understanding.  She is attempting to rest @ this time

## 2020-02-16 NOTE — ED NOTES
Patient ambulatory to and from bathroom without assistance.      Francsico Menchaca RN  02/16/20 9938

## 2020-02-16 NOTE — PROGRESS NOTES
hospitalist perfect served for routine consult for influenza B and tachycardia. Requested immediate call back to primary RN d/t sustained HR 150s and worsening abd cramps. Clinical and charge RN aware.

## 2020-02-17 LAB
ANION GAP SERPL CALCULATED.3IONS-SCNC: 9 MMOL/L (ref 3–16)
BUN BLDV-MCNC: 3 MG/DL (ref 7–20)
CALCIUM SERPL-MCNC: 8.4 MG/DL (ref 8.3–10.6)
CHLORIDE BLD-SCNC: 107 MMOL/L (ref 99–110)
CO2: 20 MMOL/L (ref 21–32)
CREAT SERPL-MCNC: <0.5 MG/DL (ref 0.6–1.1)
EKG ATRIAL RATE: 128 BPM
EKG DIAGNOSIS: NORMAL
EKG P AXIS: 68 DEGREES
EKG P-R INTERVAL: 138 MS
EKG Q-T INTERVAL: 298 MS
EKG QRS DURATION: 66 MS
EKG QTC CALCULATION (BAZETT): 435 MS
EKG R AXIS: 58 DEGREES
EKG T AXIS: 95 DEGREES
EKG VENTRICULAR RATE: 128 BPM
GFR AFRICAN AMERICAN: >60
GFR NON-AFRICAN AMERICAN: >60
GLUCOSE BLD-MCNC: 98 MG/DL (ref 70–99)
POTASSIUM SERPL-SCNC: 3.6 MMOL/L (ref 3.5–5.1)
SODIUM BLD-SCNC: 136 MMOL/L (ref 136–145)
TSH SERPL DL<=0.05 MIU/L-ACNC: 1.04 UIU/ML (ref 0.27–4.2)

## 2020-02-17 PROCEDURE — 84443 ASSAY THYROID STIM HORMONE: CPT

## 2020-02-17 PROCEDURE — 6360000002 HC RX W HCPCS: Performed by: OBSTETRICS & GYNECOLOGY

## 2020-02-17 PROCEDURE — 94640 AIRWAY INHALATION TREATMENT: CPT

## 2020-02-17 PROCEDURE — 6370000000 HC RX 637 (ALT 250 FOR IP): Performed by: OBSTETRICS & GYNECOLOGY

## 2020-02-17 PROCEDURE — G0378 HOSPITAL OBSERVATION PER HR: HCPCS

## 2020-02-17 PROCEDURE — 96372 THER/PROPH/DIAG INJ SC/IM: CPT

## 2020-02-17 PROCEDURE — 96376 TX/PRO/DX INJ SAME DRUG ADON: CPT

## 2020-02-17 PROCEDURE — 36415 COLL VENOUS BLD VENIPUNCTURE: CPT

## 2020-02-17 PROCEDURE — 93010 ELECTROCARDIOGRAM REPORT: CPT | Performed by: INTERNAL MEDICINE

## 2020-02-17 PROCEDURE — 80048 BASIC METABOLIC PNL TOTAL CA: CPT

## 2020-02-17 PROCEDURE — 6370000000 HC RX 637 (ALT 250 FOR IP): Performed by: INTERNAL MEDICINE

## 2020-02-17 PROCEDURE — 2580000003 HC RX 258: Performed by: INTERNAL MEDICINE

## 2020-02-17 PROCEDURE — 59025 FETAL NON-STRESS TEST: CPT

## 2020-02-17 PROCEDURE — 6360000002 HC RX W HCPCS: Performed by: NURSE PRACTITIONER

## 2020-02-17 RX ORDER — CALCIUM CARBONATE 200(500)MG
500 TABLET,CHEWABLE ORAL 3 TIMES DAILY PRN
Status: DISCONTINUED | OUTPATIENT
Start: 2020-02-17 | End: 2020-02-18 | Stop reason: HOSPADM

## 2020-02-17 RX ADMIN — Medication 1 TABLET: at 09:42

## 2020-02-17 RX ADMIN — ALBUTEROL SULFATE 2.5 MG: 2.5 SOLUTION RESPIRATORY (INHALATION) at 16:10

## 2020-02-17 RX ADMIN — SODIUM CHLORIDE: 9 INJECTION, SOLUTION INTRAVENOUS at 19:49

## 2020-02-17 RX ADMIN — ONDANSETRON 4 MG: 2 INJECTION INTRAMUSCULAR; INTRAVENOUS at 13:51

## 2020-02-17 RX ADMIN — OSELTAMIVIR PHOSPHATE 75 MG: 75 CAPSULE ORAL at 09:42

## 2020-02-17 RX ADMIN — ALBUTEROL SULFATE 2.5 MG: 2.5 SOLUTION RESPIRATORY (INHALATION) at 11:17

## 2020-02-17 RX ADMIN — ALBUTEROL SULFATE 2.5 MG: 2.5 SOLUTION RESPIRATORY (INHALATION) at 01:37

## 2020-02-17 RX ADMIN — OSELTAMIVIR PHOSPHATE 75 MG: 75 CAPSULE ORAL at 19:49

## 2020-02-17 RX ADMIN — ALBUTEROL SULFATE 2.5 MG: 2.5 SOLUTION RESPIRATORY (INHALATION) at 20:38

## 2020-02-17 RX ADMIN — ONDANSETRON 4 MG: 2 INJECTION INTRAMUSCULAR; INTRAVENOUS at 04:23

## 2020-02-17 RX ADMIN — ACETAMINOPHEN 650 MG: 325 TABLET ORAL at 06:37

## 2020-02-17 RX ADMIN — ANTACID TABLETS 500 MG: 500 TABLET, CHEWABLE ORAL at 16:07

## 2020-02-17 RX ADMIN — ENOXAPARIN SODIUM 40 MG: 40 INJECTION SUBCUTANEOUS at 09:42

## 2020-02-17 ASSESSMENT — PAIN SCALES - GENERAL
PAINLEVEL_OUTOF10: 6
PAINLEVEL_OUTOF10: 3
PAINLEVEL_OUTOF10: 5
PAINLEVEL_OUTOF10: 5

## 2020-02-17 NOTE — PROGRESS NOTES
Shift assessment completed and charted. Pt alert and oriented. Pt MEWS 6 d/t BP running 92/46, HR 120s-130s, RR 24. Afebrile at this time. Pt does state that she feels dizzy. Pt ambulating to the restroom and back with standby assistance and tolerated well. Lungs clear at this time. Tele on. Pt still c/o abd cramping on and off. Pt does state that she still feels her baby kick from time to time. Pt verbalized understanding to call out if abd cramping worsens or increases in time and frequency. Will continue to monitor closely. Lab results and CXR results in. Perfect Serve sent to CNP to evaluate. See new orders for Mag and K+. Addendum: Spoke with SATISH Senior. She wanted to verify with OBGYN if it was okay to start pt on a standard daily dose of Lovenox. Spoke with Dr. Ricky Petit. He stated it was fine for pt to take Lovenox. Notified CNP. Lovenox ordered.

## 2020-02-17 NOTE — PROGRESS NOTES
Pt continues to MEWS high (6). HR 130s, BP dropped slightly to 89/44, MAP 59. Spoke with CNP. Do not need to further notify her unless pts systolic <75 or HR >012. Will continue to monitor.

## 2020-02-17 NOTE — PLAN OF CARE
Pt daily care needs met during shift. Pt bathed self during shift. Pt observed feeding self; This RN observed the OB RN showing patient how to stretch muscles to prepare for labor; Will continue to monitor.

## 2020-02-17 NOTE — PROGRESS NOTES
Antepartum    Pt is a 21 yo  at 45.5 HD2 with influenza. Pt states she feels a little better this am, sitting up ready to eat breakfast. Not felt baby much yet this am.    Vitals:    20 2116 20 2255 20 0126 20 0420   BP: (!) 89/44 (!) 94/56 (!) 95/45 (!) 85/41   Pulse: 135 135 118 132   Resp:    Temp: 99 °F (37.2 °C) 99.2 °F (37.3 °C) 98.4 °F (36.9 °C) 98.2 °F (36.8 °C)   TempSrc: Oral Oral Oral Oral   SpO2: 99% 95% 96% 94%   Weight:       Height:           A/P: 21 yo  at 36.5 with influenza    Appreciate internal medicine management respiratory symptoms, continued tachycardia.    Daily NST for fetal wellbeing  General diet  Lovenox dvt prophylaxis     Mady Morillo MD

## 2020-02-17 NOTE — PROGRESS NOTES
Hospitalist Progress Note      PCP: No primary care provider on file. Date of Admission: 2/16/2020    Chief Complaint: Cough, shortness of breath    Hospital Course: Admitted with influenza B. Noted to be hypotensive and tachycardic. Those problems continue, but at the lower severity and patient is asymptomatic. Subjective: No chest pain. Shortness of breath has improved and the small amount of shortness of breath and wheezes are responsive to albuterol. No nausea or vomiting. Medications:  Reviewed    Infusion Medications    sodium chloride 125 mL/hr at 02/16/20 2300     Scheduled Medications    prenatal vitamin  1 tablet Oral Daily    sodium chloride flush  10 mL Intravenous 2 times per day    oseltamivir  75 mg Oral BID    enoxaparin  40 mg Subcutaneous Daily     PRN Meds: sodium chloride flush, acetaminophen, ondansetron, albuterol      Intake/Output Summary (Last 24 hours) at 2/17/2020 1339  Last data filed at 2/17/2020 0850  Gross per 24 hour   Intake 4151 ml   Output 1500 ml   Net 2651 ml       Physical Exam Performed:    BP 96/63   Pulse 114   Temp 97.5 °F (36.4 °C) (Axillary)   Resp 16   Ht 5' (1.524 m)   Wt 172 lb (78 kg)   LMP 04/21/2019   SpO2 97%   BMI 33.59 kg/m²     General appearance: No apparent distress, appears stated age and cooperative. HEENT: Pupils equal, round, and reactive to light. Conjunctivae/corneas clear. Neck: Supple, with full range of motion. No jugular venous distention. Trachea midline. Respiratory:  Normal respiratory effort. Lungs are clear today. Cardiovascular: Regular rate and rhythm with normal S1/S2 without murmurs, rubs or gallops. Abdomen: Pregnant belly, soft, non-tender, non-distended with normal bowel sounds. Musculoskeletal: No clubbing, cyanosis or edema bilaterally. Full range of motion without deformity. Skin: Skin color, texture, turgor normal.  No rashes or lesions.   Neurologic:  Neurovascularly intact without any focal monitor.     Last trimester pregnancy  Primary service to follow and assess. No acute problems detected.     Discussed with the patient, questions answered    DVT Prophylaxis: Lovenox  Diet: DIET GENERAL;  Code Status: Full Code    PT/OT Eval Status: Not needed    Dispo -per primary service    Frannie Caballero MD

## 2020-02-18 VITALS
HEIGHT: 60 IN | HEART RATE: 98 BPM | BODY MASS INDEX: 33.77 KG/M2 | OXYGEN SATURATION: 96 % | RESPIRATION RATE: 18 BRPM | TEMPERATURE: 97.6 F | SYSTOLIC BLOOD PRESSURE: 93 MMHG | DIASTOLIC BLOOD PRESSURE: 60 MMHG | WEIGHT: 172 LBS

## 2020-02-18 LAB
ANION GAP SERPL CALCULATED.3IONS-SCNC: 11 MMOL/L (ref 3–16)
BUN BLDV-MCNC: <2 MG/DL (ref 7–20)
CALCIUM SERPL-MCNC: 8.3 MG/DL (ref 8.3–10.6)
CHLORIDE BLD-SCNC: 106 MMOL/L (ref 99–110)
CO2: 20 MMOL/L (ref 21–32)
CREAT SERPL-MCNC: <0.5 MG/DL (ref 0.6–1.1)
GFR AFRICAN AMERICAN: >60
GFR NON-AFRICAN AMERICAN: >60
GLUCOSE BLD-MCNC: 97 MG/DL (ref 70–99)
MAGNESIUM: 1.5 MG/DL (ref 1.8–2.4)
POTASSIUM SERPL-SCNC: 3.5 MMOL/L (ref 3.5–5.1)
SODIUM BLD-SCNC: 137 MMOL/L (ref 136–145)

## 2020-02-18 PROCEDURE — 96376 TX/PRO/DX INJ SAME DRUG ADON: CPT

## 2020-02-18 PROCEDURE — 2580000003 HC RX 258: Performed by: INTERNAL MEDICINE

## 2020-02-18 PROCEDURE — 6360000002 HC RX W HCPCS: Performed by: INTERNAL MEDICINE

## 2020-02-18 PROCEDURE — 94640 AIRWAY INHALATION TREATMENT: CPT

## 2020-02-18 PROCEDURE — G0378 HOSPITAL OBSERVATION PER HR: HCPCS

## 2020-02-18 PROCEDURE — 36415 COLL VENOUS BLD VENIPUNCTURE: CPT

## 2020-02-18 PROCEDURE — 6370000000 HC RX 637 (ALT 250 FOR IP): Performed by: OBSTETRICS & GYNECOLOGY

## 2020-02-18 PROCEDURE — 96372 THER/PROPH/DIAG INJ SC/IM: CPT

## 2020-02-18 PROCEDURE — 80048 BASIC METABOLIC PNL TOTAL CA: CPT

## 2020-02-18 PROCEDURE — 83735 ASSAY OF MAGNESIUM: CPT

## 2020-02-18 PROCEDURE — 6360000002 HC RX W HCPCS: Performed by: NURSE PRACTITIONER

## 2020-02-18 PROCEDURE — 6360000002 HC RX W HCPCS: Performed by: OBSTETRICS & GYNECOLOGY

## 2020-02-18 PROCEDURE — 59025 FETAL NON-STRESS TEST: CPT

## 2020-02-18 RX ORDER — MAGNESIUM SULFATE IN WATER 40 MG/ML
4 INJECTION, SOLUTION INTRAVENOUS ONCE
Status: COMPLETED | OUTPATIENT
Start: 2020-02-18 | End: 2020-02-18

## 2020-02-18 RX ORDER — OSELTAMIVIR PHOSPHATE 75 MG/1
75 CAPSULE ORAL 2 TIMES DAILY
Qty: 6 CAPSULE | Refills: 0 | Status: SHIPPED | OUTPATIENT
Start: 2020-02-18 | End: 2020-02-21

## 2020-02-18 RX ORDER — ALBUTEROL SULFATE 2.5 MG/3ML
2.5 SOLUTION RESPIRATORY (INHALATION) 4 TIMES DAILY
Status: DISCONTINUED | OUTPATIENT
Start: 2020-02-18 | End: 2020-02-18 | Stop reason: HOSPADM

## 2020-02-18 RX ADMIN — Medication 1 TABLET: at 09:51

## 2020-02-18 RX ADMIN — ALBUTEROL SULFATE 2.5 MG: 2.5 SOLUTION RESPIRATORY (INHALATION) at 02:04

## 2020-02-18 RX ADMIN — OSELTAMIVIR PHOSPHATE 75 MG: 75 CAPSULE ORAL at 09:51

## 2020-02-18 RX ADMIN — MAGNESIUM SULFATE HEPTAHYDRATE 4 G: 40 INJECTION, SOLUTION INTRAVENOUS at 09:52

## 2020-02-18 RX ADMIN — ALBUTEROL SULFATE 2.5 MG: 2.5 SOLUTION RESPIRATORY (INHALATION) at 12:22

## 2020-02-18 RX ADMIN — ALBUTEROL SULFATE 2.5 MG: 2.5 SOLUTION RESPIRATORY (INHALATION) at 07:45

## 2020-02-18 RX ADMIN — ENOXAPARIN SODIUM 40 MG: 40 INJECTION SUBCUTANEOUS at 09:51

## 2020-02-18 RX ADMIN — SODIUM CHLORIDE: 9 INJECTION, SOLUTION INTRAVENOUS at 03:36

## 2020-02-18 ASSESSMENT — PAIN SCALES - GENERAL: PAINLEVEL_OUTOF10: 0

## 2020-02-18 NOTE — CARE COORDINATION
D/c order noted. Spoke with [employer]. No DCP needs. Needs 2 more doses tamiflu patient has insurance.  Radha Juarez RN

## 2020-02-18 NOTE — PROGRESS NOTES
Hospitalist Progress Note      PCP: No primary care provider on file. Date of Admission: 2/16/2020    Chief Complaint: Cough, shortness of breath    Hospital Course: Admitted with influenza B. Noted to be hypotensive and tachycardic on admission. Blood pressure is acceptable. Tachycardia resolving. Subjective: No chest pain. Resolved shortness of breath. No nausea or vomiting. No palpitation. Medications:  Reviewed    Infusion Medications    sodium chloride 125 mL/hr at 02/18/20 0336     Scheduled Medications    albuterol  2.5 mg Nebulization 4x daily    magnesium sulfate  4 g Intravenous Once    prenatal vitamin  1 tablet Oral Daily    sodium chloride flush  10 mL Intravenous 2 times per day    oseltamivir  75 mg Oral BID    enoxaparin  40 mg Subcutaneous Daily     PRN Meds: calcium carbonate, sodium chloride flush, acetaminophen, ondansetron, albuterol      Intake/Output Summary (Last 24 hours) at 2/18/2020 1129  Last data filed at 2/18/2020 0355  Gross per 24 hour   Intake 3571 ml   Output --   Net 3571 ml       Physical Exam Performed:    BP (!) 95/59   Pulse 109   Temp 97.8 °F (36.6 °C) (Oral)   Resp 20   Ht 5' (1.524 m)   Wt 172 lb (78 kg)   LMP 04/21/2019   SpO2 96%   BMI 33.59 kg/m²     General appearance: No apparent distress, appears stated age and cooperative. HEENT: Pupils equal, round, and reactive to light. Conjunctivae/corneas clear. Neck: Supple, with full range of motion. No jugular venous distention. Trachea midline. Respiratory:  Normal respiratory effort. Lungs are clear. Cardiovascular: Regular rate and rhythm with normal S1/S2 without murmurs, rubs or gallops. Abdomen: Pregnant belly, soft, non-tender, non-distended with normal bowel sounds. Musculoskeletal: No clubbing, cyanosis or edema bilaterally. Full range of motion without deformity. Skin: Skin color, texture, turgor normal.  No rashes or lesions.   Neurologic:  Neurovascularly intact without any focal sensory/motor deficits. Cranial nerves: II-XII intact, grossly non-focal.  Psychiatric: Alert and oriented, thought content appropriate, normal insight  Capillary Refill: Brisk,< 3 seconds   Peripheral Pulses: +2 palpable, equal bilaterally       Labs:   Recent Labs     02/16/20  0338 02/16/20  1845   WBC 10.1 6.0   HGB 10.8* 8.8*   HCT 30.2* 25.7*    183     Recent Labs     02/16/20  1845 02/17/20  1102 02/18/20  0636   * 136 137   K 3.2* 3.6 3.5    107 106   CO2 19* 20* 20*   BUN 3* 3* <2*   CREATININE <0.5* <0.5* <0.5*   CALCIUM 8.1* 8.4 8.3   PHOS 3.0  --   --      No results for input(s): AST, ALT, BILIDIR, BILITOT, ALKPHOS in the last 72 hours. No results for input(s): INR in the last 72 hours. No results for input(s): Eugune Ran in the last 72 hours. Urinalysis:      Lab Results   Component Value Date    NITRU Negative 12/23/2019    WBCUA 6-10 12/23/2019    BACTERIA 1+ 12/23/2019    RBCUA 0-2 12/23/2019    BLOODU Negative 12/23/2019    SPECGRAV 1.020 12/23/2019    GLUCOSEU Negative 12/23/2019       Radiology:  XR CHEST PORTABLE   Final Result   Findings of focal infiltrate typical of pneumonitis central lower left lung. Assessment/Plan:    Active Hospital Problems    Diagnosis    Flu [J11.1]    Tachycardia [R00.0]     PLAN:    Influenza B  Continue Tamiflu to complete 5-day course. No signs of viral pneumonia, patient is not septic, not in septic shock. Patient feels fine, afebrile. Can be discharged to complete Tamiflu course at home.     Sinus tachycardia  Most likely secondary to acute illness and dehydration  Heart rate is coming down, close to normal levels. Acceptable blood pressure. Nothing that interferes with discharge     Last trimester pregnancy  Primary service to follow and assess. No acute problems detected. Discussed with the patient, questions answered  Discussed with the primary service, OB/GYN.     DVT Prophylaxis: Lovenox  Diet: DIET GENERAL;  Code Status: Full Code    PT/OT Eval Status: Not needed    Dispo -can be discharged home today.     Braydon Pizarro MD

## 2020-02-18 NOTE — PROGRESS NOTES
Discharge: Pt discharged to home as per order. IV removed. Script plus instructions given. Pt verbalized understanding. Denied questions.

## 2020-02-18 NOTE — PROGRESS NOTES
S: Patient denies LOF, VB or contractions. The fetus is active    O: BP (!) 95/59   Pulse 109   Temp 97.8 °F (36.6 °C) (Oral)   Resp 20   Ht 5' (1.524 m)   Wt 172 lb (78 kg)   LMP 2019   SpO2 96%   BMI 33.59 kg/m²   Lungs - CTAB  CVS - tachycardia but regular rhythm  Abd - gravid, NT  Ext - trace edema    WBC/Hgb/Hct/Plts:  6.0/8.8/25.7/183 (1845)    A/P: 21 yo  @ 36w6d  1. Flu - clinically improved, continue tamiflu  2.  Discussed with internal medicine, will d/c home today to follow-up in our office this week after reactive NST today

## 2020-02-18 NOTE — DISCHARGE SUMMARY
Admitting diagnosis:  1. Infulenza B  2. Tachycardia  3. IUP at 36w4d    Discharge diagnosis:  1. Influenza B  2. Tachycardia  3. IUP at [facility] course: The patient was admitted for observation due to tachycardia, positive influenza b and pregnancy at 36 weeks. The patient remained tachycardic throughout the admission with some hypotension, however fetal testing has been reassuring throughout. The patient feels significantly better at the time of discharge from admission. She is being discharged home in stable condition to complete the 5 day course of oseltamivir and will follow-up in our office this week. See progress notes from today for exam. Appreciate management provided by internal medicine during this admission.

## 2020-03-09 ENCOUNTER — ANESTHESIA (OUTPATIENT)
Dept: LABOR AND DELIVERY | Age: 20
DRG: 540 | End: 2020-03-09
Payer: MEDICAID

## 2020-03-09 ENCOUNTER — HOSPITAL ENCOUNTER (INPATIENT)
Age: 20
LOS: 3 days | Discharge: HOME | DRG: 540 | End: 2020-03-12
Attending: OBSTETRICS & GYNECOLOGY | Admitting: OBSTETRICS & GYNECOLOGY
Payer: MEDICAID

## 2020-03-09 ENCOUNTER — ANESTHESIA EVENT (OUTPATIENT)
Dept: LABOR AND DELIVERY | Age: 20
DRG: 540 | End: 2020-03-09
Payer: MEDICAID

## 2020-03-09 PROBLEM — O99.333 TOBACCO SMOKING AFFECTING PREGNANCY IN THIRD TRIMESTER: Status: ACTIVE | Noted: 2020-03-09

## 2020-03-09 PROBLEM — A74.9 CHLAMYDIA INFECTION DURING PREGNANCY: Status: ACTIVE | Noted: 2020-03-09

## 2020-03-09 PROBLEM — Z37.9 NORMAL LABOR: Status: ACTIVE | Noted: 2020-03-09

## 2020-03-09 PROBLEM — O99.013 ANEMIA DURING PREGNANCY IN THIRD TRIMESTER: Status: ACTIVE | Noted: 2020-03-09

## 2020-03-09 PROBLEM — O99.320 SUBSTANCE ABUSE AFFECTING PREGNANCY, ANTEPARTUM (HCC): Status: ACTIVE | Noted: 2020-03-09

## 2020-03-09 PROBLEM — O99.820 GROUP B STREPTOCOCCUS CARRIER, +RV CULTURE, CURRENTLY PREGNANT: Status: ACTIVE | Noted: 2020-03-09

## 2020-03-09 PROBLEM — O09.30 LATE PRENATAL CARE: Status: ACTIVE | Noted: 2020-03-09

## 2020-03-09 PROBLEM — O98.819 CHLAMYDIA INFECTION DURING PREGNANCY: Status: ACTIVE | Noted: 2020-03-09

## 2020-03-09 PROBLEM — F19.10 SUBSTANCE ABUSE AFFECTING PREGNANCY, ANTEPARTUM (HCC): Status: ACTIVE | Noted: 2020-03-09

## 2020-03-09 LAB
ABO/RH: NORMAL
AMPHETAMINE SCREEN, URINE: NORMAL
ANTIBODY SCREEN: NORMAL
BARBITURATE SCREEN URINE: NORMAL
BASOPHILS ABSOLUTE: 0 K/UL (ref 0–0.2)
BASOPHILS RELATIVE PERCENT: 0.4 %
BENZODIAZEPINE SCREEN, URINE: NORMAL
BUPRENORPHINE URINE: NORMAL
CANNABINOID SCREEN URINE: NORMAL
COCAINE METABOLITE SCREEN URINE: NORMAL
EOSINOPHILS ABSOLUTE: 0.1 K/UL (ref 0–0.6)
EOSINOPHILS RELATIVE PERCENT: 1.1 %
HCT VFR BLD CALC: 32.7 % (ref 36–48)
HEMOGLOBIN: 11.7 G/DL (ref 12–16)
LYMPHOCYTES ABSOLUTE: 2.1 K/UL (ref 1–5.1)
LYMPHOCYTES RELATIVE PERCENT: 20.6 %
Lab: NORMAL
MCH RBC QN AUTO: 30.6 PG (ref 26–34)
MCHC RBC AUTO-ENTMCNC: 35.7 G/DL (ref 31–36)
MCV RBC AUTO: 85.7 FL (ref 80–100)
METHADONE SCREEN, URINE: NORMAL
MONOCYTES ABSOLUTE: 0.8 K/UL (ref 0–1.3)
MONOCYTES RELATIVE PERCENT: 7.3 %
NEUTROPHILS ABSOLUTE: 7.2 K/UL (ref 1.7–7.7)
NEUTROPHILS RELATIVE PERCENT: 70.6 %
OPIATE SCREEN URINE: NORMAL
OXYCODONE URINE: NORMAL
PDW BLD-RTO: 13.2 % (ref 12.4–15.4)
PH UA: 7
PHENCYCLIDINE SCREEN URINE: NORMAL
PLATELET # BLD: 267 K/UL (ref 135–450)
PMV BLD AUTO: 7.1 FL (ref 5–10.5)
PROPOXYPHENE SCREEN: NORMAL
RBC # BLD: 3.82 M/UL (ref 4–5.2)
TOTAL SYPHILLIS IGG/IGM: NORMAL
WBC # BLD: 10.3 K/UL (ref 4–11)

## 2020-03-09 PROCEDURE — 85025 COMPLETE CBC W/AUTO DIFF WBC: CPT

## 2020-03-09 PROCEDURE — 3E033VJ INTRODUCTION OF OTHER HORMONE INTO PERIPHERAL VEIN, PERCUTANEOUS APPROACH: ICD-10-PCS | Performed by: OBSTETRICS & GYNECOLOGY

## 2020-03-09 PROCEDURE — 6360000002 HC RX W HCPCS: Performed by: OBSTETRICS & GYNECOLOGY

## 2020-03-09 PROCEDURE — 51701 INSERT BLADDER CATHETER: CPT

## 2020-03-09 PROCEDURE — 10907ZC DRAINAGE OF AMNIOTIC FLUID, THERAPEUTIC FROM PRODUCTS OF CONCEPTION, VIA NATURAL OR ARTIFICIAL OPENING: ICD-10-PCS | Performed by: OBSTETRICS & GYNECOLOGY

## 2020-03-09 PROCEDURE — 3700000025 EPIDURAL BLOCK: Performed by: ANESTHESIOLOGY

## 2020-03-09 PROCEDURE — 80307 DRUG TEST PRSMV CHEM ANLYZR: CPT

## 2020-03-09 PROCEDURE — 86900 BLOOD TYPING SEROLOGIC ABO: CPT

## 2020-03-09 PROCEDURE — 86901 BLOOD TYPING SEROLOGIC RH(D): CPT

## 2020-03-09 PROCEDURE — 86850 RBC ANTIBODY SCREEN: CPT

## 2020-03-09 PROCEDURE — 86780 TREPONEMA PALLIDUM: CPT

## 2020-03-09 PROCEDURE — 2500000003 HC RX 250 WO HCPCS: Performed by: NURSE ANESTHETIST, CERTIFIED REGISTERED

## 2020-03-09 PROCEDURE — 2580000003 HC RX 258

## 2020-03-09 PROCEDURE — 1220000000 HC SEMI PRIVATE OB R&B

## 2020-03-09 PROCEDURE — 2580000003 HC RX 258: Performed by: OBSTETRICS & GYNECOLOGY

## 2020-03-09 RX ORDER — SODIUM CHLORIDE, SODIUM LACTATE, POTASSIUM CHLORIDE, CALCIUM CHLORIDE 600; 310; 30; 20 MG/100ML; MG/100ML; MG/100ML; MG/100ML
INJECTION, SOLUTION INTRAVENOUS CONTINUOUS
Status: DISCONTINUED | OUTPATIENT
Start: 2020-03-09 | End: 2020-03-10

## 2020-03-09 RX ORDER — BUTORPHANOL TARTRATE 1 MG/ML
1 INJECTION, SOLUTION INTRAMUSCULAR; INTRAVENOUS
Status: DISCONTINUED | OUTPATIENT
Start: 2020-03-09 | End: 2020-03-10

## 2020-03-09 RX ORDER — SODIUM CHLORIDE 0.9 % (FLUSH) 0.9 %
10 SYRINGE (ML) INJECTION PRN
Status: DISCONTINUED | OUTPATIENT
Start: 2020-03-09 | End: 2020-03-10

## 2020-03-09 RX ORDER — BUPIVACAINE HYDROCHLORIDE 2.5 MG/ML
INJECTION, SOLUTION EPIDURAL; INFILTRATION; INTRACAUDAL PRN
Status: DISCONTINUED | OUTPATIENT
Start: 2020-03-09 | End: 2020-03-10 | Stop reason: SDUPTHER

## 2020-03-09 RX ORDER — TERBUTALINE SULFATE 1 MG/ML
0.25 INJECTION, SOLUTION SUBCUTANEOUS ONCE
Status: DISCONTINUED | OUTPATIENT
Start: 2020-03-09 | End: 2020-03-10

## 2020-03-09 RX ORDER — SODIUM CHLORIDE 0.9 % (FLUSH) 0.9 %
10 SYRINGE (ML) INJECTION EVERY 12 HOURS SCHEDULED
Status: DISCONTINUED | OUTPATIENT
Start: 2020-03-09 | End: 2020-03-10

## 2020-03-09 RX ORDER — NALBUPHINE HCL 10 MG/ML
10 AMPUL (ML) INJECTION
Status: DISCONTINUED | OUTPATIENT
Start: 2020-03-09 | End: 2020-03-10

## 2020-03-09 RX ORDER — SODIUM CHLORIDE, SODIUM LACTATE, POTASSIUM CHLORIDE, CALCIUM CHLORIDE 600; 310; 30; 20 MG/100ML; MG/100ML; MG/100ML; MG/100ML
INJECTION, SOLUTION INTRAVENOUS
Status: COMPLETED
Start: 2020-03-09 | End: 2020-03-09

## 2020-03-09 RX ORDER — METOCLOPRAMIDE 5 MG/1
5 TABLET ORAL 4 TIMES DAILY
COMMUNITY

## 2020-03-09 RX ORDER — PHENYLEPHRINE HCL IN 0.9% NACL 1 MG/10 ML
SYRINGE (ML) INTRAVENOUS PRN
Status: DISCONTINUED | OUTPATIENT
Start: 2020-03-09 | End: 2020-03-10 | Stop reason: SDUPTHER

## 2020-03-09 RX ORDER — SEVOFLURANE 250 ML/250ML
1 LIQUID RESPIRATORY (INHALATION) CONTINUOUS PRN
Status: DISCONTINUED | OUTPATIENT
Start: 2020-03-09 | End: 2020-03-10

## 2020-03-09 RX ORDER — DOCUSATE SODIUM 100 MG/1
100 CAPSULE, LIQUID FILLED ORAL 2 TIMES DAILY
Status: DISCONTINUED | OUTPATIENT
Start: 2020-03-09 | End: 2020-03-10

## 2020-03-09 RX ORDER — ONDANSETRON 2 MG/ML
4 INJECTION INTRAMUSCULAR; INTRAVENOUS EVERY 6 HOURS PRN
Status: DISCONTINUED | OUTPATIENT
Start: 2020-03-09 | End: 2020-03-10

## 2020-03-09 RX ADMIN — Medication 2.5 MILLION UNITS: at 20:31

## 2020-03-09 RX ADMIN — Medication 2.5 MILLION UNITS: at 12:15

## 2020-03-09 RX ADMIN — DEXTROSE MONOHYDRATE 5 MILLION UNITS: 5 INJECTION INTRAVENOUS at 08:22

## 2020-03-09 RX ADMIN — Medication 1 MILLI-UNITS/MIN: at 08:57

## 2020-03-09 RX ADMIN — SODIUM CHLORIDE, POTASSIUM CHLORIDE, SODIUM LACTATE AND CALCIUM CHLORIDE: 600; 310; 30; 20 INJECTION, SOLUTION INTRAVENOUS at 11:31

## 2020-03-09 RX ADMIN — SODIUM CHLORIDE, POTASSIUM CHLORIDE, SODIUM LACTATE AND CALCIUM CHLORIDE: 600; 310; 30; 20 INJECTION, SOLUTION INTRAVENOUS at 23:14

## 2020-03-09 RX ADMIN — SODIUM CHLORIDE, POTASSIUM CHLORIDE, SODIUM LACTATE AND CALCIUM CHLORIDE: 600; 310; 30; 20 INJECTION, SOLUTION INTRAVENOUS at 14:16

## 2020-03-09 RX ADMIN — SODIUM CHLORIDE, POTASSIUM CHLORIDE, SODIUM LACTATE AND CALCIUM CHLORIDE: 600; 310; 30; 20 INJECTION, SOLUTION INTRAVENOUS at 08:21

## 2020-03-09 RX ADMIN — BUPIVACAINE HYDROCHLORIDE 5 ML: 2.5 INJECTION, SOLUTION EPIDURAL; INFILTRATION; INTRACAUDAL; PERINEURAL at 11:58

## 2020-03-09 RX ADMIN — Medication 2.5 MILLION UNITS: at 16:26

## 2020-03-09 RX ADMIN — ONDANSETRON 4 MG: 2 INJECTION INTRAMUSCULAR; INTRAVENOUS at 20:42

## 2020-03-09 RX ADMIN — Medication 15 ML/HR: at 12:05

## 2020-03-09 RX ADMIN — ONDANSETRON 4 MG: 2 INJECTION INTRAMUSCULAR; INTRAVENOUS at 12:49

## 2020-03-09 RX ADMIN — Medication 100 MCG: at 18:00

## 2020-03-09 RX ADMIN — SODIUM CHLORIDE, POTASSIUM CHLORIDE, SODIUM LACTATE AND CALCIUM CHLORIDE 1000 ML: 600; 310; 30; 20 INJECTION, SOLUTION INTRAVENOUS at 11:31

## 2020-03-09 RX ADMIN — SODIUM CHLORIDE, POTASSIUM CHLORIDE, SODIUM LACTATE AND CALCIUM CHLORIDE: 600; 310; 30; 20 INJECTION, SOLUTION INTRAVENOUS at 18:00

## 2020-03-09 ASSESSMENT — LIFESTYLE VARIABLES: SMOKING_STATUS: 1

## 2020-03-09 NOTE — H&P
Department of Obstetrics and Gynecology  Labor and Delivery Admit Note        CHIEF COMPLAINT:  leakage of amniotic fluid    HISTORY OF PRESENT ILLNESS:      The patient is a 22 y.o. female 38w11d. OB History        3    Para        Term                AB   2    Living           SAB   2    TAB        Ectopic        Molar        Multiple        Live Births                  Patient c/o LOF starting at 0530 today. Mild ctx. No vb, +fm. Recent treatment for influenza w/tamiflu, last triage visit 3wks ago. Estimated Due Date:  Estimated Date of Delivery: 3/11/20    PAST MEDICAL HISTORY:   Past Medical History:   Diagnosis Date    ADHD (attention deficit hyperactivity disorder)     Asthma     Chlamydia infection 2019    Influenza B 2020       PAST  SURGICAL HISTORY:   Past Surgical History:   Procedure Laterality Date    TUMOR REMOVAL  2019    left side of throat        SOCIAL HISTORY:     reports that she has been smoking cigarettes. She has been smoking about 0.25 packs per day. She has never used smokeless tobacco. She reports previous drug use. Drug: Marijuana. She reports that she does not drink alcohol. MEDICATIONS:    Prior to Admission medications    Medication Sig Start Date End Date Taking? Authorizing Provider   famotidine (PEPCID) 10 MG tablet Take 10 mg by mouth 2 times daily as needed    Historical Provider, MD   Prenatal Vit-Fe Fumarate-FA (PRENATAL VITAMINS) 28-0.8 MG TABS Take 1 tablet by mouth daily 7/10/19   Pravin Ozuna, APRN - CNP        PRENATAL CARE:    Complicated by: Chlamydia in early preg, JASSI neg x 2, +Tob, +MJ, +GBS    REVIEW OF SYSTEMS:     Pertinent items are noted in HPI.     PHYSICAL EXAM:    Vital Signs:   Vitals:    20 0645   BP: 118/77   Pulse: 99   Resp: 17   Temp: 98 °F (36.7 °C)       General appearance: alert, appears stated age, cooperative and no distress  Lungs: clear to auscultation bilaterally  Heart: regular rate and rhythm  Abdomen: Term gravid, no fundal tend  Neurologic: Grossly normal    Fetal heart rate:  Baseline Heart Rate 145, accelerations:  present  long term variability:  moderate  decelerations:  absent  Cervix:  1 cm 70 soft -2, [place] and [place] POSITIVE    Contraction frequency:  irreg    Membranes:  Intact    General Labs:      CBC:   Lab Results   Component Value Date    WBC 6.0 2020    RBC 2.88 2020    HGB 8.8 2020    HCT 25.7 2020    MCV 89.2 2020    MCH 30.4 2020    MCHC 34.1 2020    RDW 12.6 2020     2020    MPV 7.0 2020       ASSESSMENT/PLAN:    Patient Active Problem List   Diagnosis    Flu    Tachycardia    Normal labor    Late prenatal care    Tobacco smoking affecting pregnancy in third trimester    Substance abuse affecting pregnancy, antepartum    Chlamydia infection during pregnancy    Group B Streptococcus carrier, +RV culture, currently pregnant      22 y.o. female 38w11d. OB History        3    Para        Term                AB   2    Living           SAB   2    TAB        Ectopic        Molar        Multiple        Live Births                  SROM, admit for delivery. GBS+, start PCN prophylaxis. Reassuring fetal status.     Yen Early  3/9/2020

## 2020-03-09 NOTE — PLAN OF CARE
Problem: Pain:  Goal: Pain level will decrease  Description: Pain level will decrease  Outcome: Ongoing  Goal: Control of acute pain  Description: Control of acute pain  3/9/2020 1928 by Grey Garzon RN  Outcome: Ongoing  3/9/2020 1048 by Davis Beyer RN  Outcome: Ongoing  Goal: Control of chronic pain  Description: Control of chronic pain  Outcome: Ongoing     Problem: Anxiety:  Goal: Level of anxiety will decrease  Description: Level of anxiety will decrease  Outcome: Ongoing     Problem: Breathing Pattern - Ineffective:  Goal: Able to breathe comfortably  Description: Able to breathe comfortably  3/9/2020 1928 by Grey Garzon RN  Outcome: Ongoing  3/9/2020 1048 by Davis Beyer RN  Outcome: Ongoing     Problem: Fluid Volume - Imbalance:  Goal: Absence of imbalanced fluid volume signs and symptoms  Description: Absence of imbalanced fluid volume signs and symptoms  3/9/2020 1928 by Grey Garzon RN  Outcome: Ongoing  3/9/2020 1048 by Davis Beyer RN  Outcome: Ongoing  Goal: Absence of intrapartum hemorrhage signs and symptoms  Description: Absence of intrapartum hemorrhage signs and symptoms  3/9/2020 1928 by Grey Garzon RN  Outcome: Ongoing  3/9/2020 1048 by Davis Beyer RN  Outcome: Ongoing     Problem: Infection - Intrapartum Infection:  Goal: Will show no infection signs and symptoms  Description: Will show no infection signs and symptoms  3/9/2020 1928 by Grey Garzon RN  Outcome: Ongoing  3/9/2020 1048 by Davis Beyer RN  Outcome: Ongoing     Problem: Labor Process - Prolonged:  Goal: Labor progression, first stage, within specified pattern  Description: Labor progression, first stage, within specified pattern  Outcome: Ongoing  Goal: Labor progession, second stage, within specified pattern  Description: Labor progession, second stage, within specified pattern  Outcome: Ongoing  Goal: Uterine contractions within specified parameters  Description: Uterine contractions within specified parameters  Outcome: Ongoing     Problem:  Screening:  Goal: Ability to make informed decisions regarding treatment has improved  Description: Ability to make informed decisions regarding treatment has improved  Outcome: Ongoing     Problem: Pain - Acute:  Goal: Pain level will decrease  Description: Pain level will decrease  Outcome: Ongoing  Goal: Able to cope with pain  Description: Able to cope with pain  3/9/2020 1928 by Sienna Rodriguez RN  Outcome: Ongoing  3/9/2020 1048 by Too Aiken RN  Outcome: Ongoing     Problem: Tissue Perfusion - Uteroplacental, Altered:  Goal: Absence of abnormal fetal heart rate pattern  Description: Absence of abnormal fetal heart rate pattern  3/9/2020 1928 by Sienna Rodriguez RN  Outcome: Ongoing  3/9/2020 1048 by Too Aiken RN  Outcome: Ongoing     Problem: Urinary Retention:  Goal: Experiences of bladder distention will decrease  Description: Experiences of bladder distention will decrease  Outcome: Ongoing  Goal: Urinary elimination within specified parameters  Description: Urinary elimination within specified parameters  Outcome: Ongoing

## 2020-03-09 NOTE — PROGRESS NOTES
Patient arrived to [facility] with c/o SROM at 0530. Denies vaginal bleeding, reports +FM. Instructed to leave urine sample and change into hospital gown. Patient states she had sex about six hours ago and started feeling leaking around 0530. Reports contractions intermittent since SROM. Triage admission completed. Patient instructed to cough, no fluid noted on bed pad. Patient was not wearing a pad upon arrival to the hospital and small amount of clear fluid noted on underwear. Dr. Winters Melvin notified of patient arrival and need for SROM confirmation.

## 2020-03-09 NOTE — PROGRESS NOTES
S: patient comfortable after epidural. +FM Denies LOF or VB.  O:  Vitals:    20 1822 20 1830 20 1834 20 1855   BP: (!) 87/54 91/62 (!) 92/50 (!) 99/59   Pulse: 86 83 75 71   Resp:  16     Temp:       TempSrc:       Weight:       Height:         Cx -5/90%/-1, BBOW - AROM of forebag w/ clear fluid  [place]: ctx q 2-3 min  FHT: 120's, Cat. I   GBS positive - s/p PCN therapy  A/P: 21 y/o  @ 39.5 wks. w/ AROM  1) Labor - On pitocin & AROM of forebag performed w/o difficulty. Anticipate .    2) Fetus - tracing reassuring

## 2020-03-09 NOTE — ANESTHESIA PRE PROCEDURE
Weight:       Height:                                                  BP Readings from Last 3 Encounters:   03/09/20 115/64   02/18/20 93/60   12/31/19 120/64       NPO Status: Time of last liquid consumption: 2350                        Time of last solid consumption: 0600                        Date of last liquid consumption: 03/08/20                        Date of last solid food consumption: 03/09/20    BMI:   Wt Readings from Last 3 Encounters:   03/09/20 171 lb (77.6 kg)   02/16/20 172 lb (78 kg)   12/31/19 166 lb (75.3 kg)     Body mass index is 33.4 kg/m². CBC:   Lab Results   Component Value Date    WBC 10.3 03/09/2020    RBC 3.82 03/09/2020    HGB 11.7 03/09/2020    HCT 32.7 03/09/2020    MCV 85.7 03/09/2020    RDW 13.2 03/09/2020     03/09/2020       CMP:   Lab Results   Component Value Date     02/18/2020    K 3.5 02/18/2020    K 3.8 07/10/2019     02/18/2020    CO2 20 02/18/2020    BUN <2 02/18/2020    CREATININE <0.5 02/18/2020    GFRAA >60 02/18/2020    GFRAA >60 02/01/2012    AGRATIO 1.4 12/23/2019    LABGLOM >60 02/18/2020    GLUCOSE 97 02/18/2020    PROT 6.0 12/23/2019    CALCIUM 8.3 02/18/2020    BILITOT 0.3 12/23/2019    ALKPHOS 68 12/23/2019    AST 10 12/23/2019    ALT <5 12/23/2019       POC Tests: No results for input(s): POCGLU, POCNA, POCK, POCCL, POCBUN, POCHEMO, POCHCT in the last 72 hours.     Coags: No results found for: PROTIME, INR, APTT    HCG (If Applicable):   Lab Results   Component Value Date    PREGTESTUR Negative 03/28/2019        ABGs: No results found for: PHART, PO2ART, NTM3XCW, UAG8JKW, BEART, A4TRGBDH     Type & Screen (If Applicable):  No results found for: LABABO, [address]    Anesthesia Evaluation  Patient summary reviewed and Nursing notes reviewed no history of anesthetic complications:   Airway: Mallampati: II        Dental:          Pulmonary:   (+) asthma: current smoker                           Cardiovascular:Negative CV ROS Neuro/Psych:   (+) neuromuscular disease (Patient reports asymptomatic spina bifida not otherwised documented. Normal physical exam grossly):,             GI/Hepatic/Renal: Neg GI/Hepatic/Renal ROS            Endo/Other: Negative Endo/Other ROS                    Abdominal:           Vascular: negative vascular ROS. Anesthesia Plan      epidural     ASA 2     (Benefits and risks of USHA were discussed and agreed upon. All questions were answered, and verbal consent was obtained.)        Anesthetic plan and risks discussed with spouse and patient.                       DEEP Matson - CRNA   3/9/2020

## 2020-03-10 VITALS — SYSTOLIC BLOOD PRESSURE: 88 MMHG | OXYGEN SATURATION: 99 % | DIASTOLIC BLOOD PRESSURE: 48 MMHG

## 2020-03-10 PROBLEM — Z98.891 S/P CESAREAN SECTION: Status: ACTIVE | Noted: 2020-03-10

## 2020-03-10 PROCEDURE — 7100000000 HC PACU RECOVERY - FIRST 15 MIN: Performed by: OBSTETRICS & GYNECOLOGY

## 2020-03-10 PROCEDURE — 3700000001 HC ADD 15 MINUTES (ANESTHESIA): Performed by: OBSTETRICS & GYNECOLOGY

## 2020-03-10 PROCEDURE — 2500000003 HC RX 250 WO HCPCS: Performed by: NURSE ANESTHETIST, CERTIFIED REGISTERED

## 2020-03-10 PROCEDURE — 6360000002 HC RX W HCPCS: Performed by: OBSTETRICS & GYNECOLOGY

## 2020-03-10 PROCEDURE — 6360000002 HC RX W HCPCS: Performed by: NURSE ANESTHETIST, CERTIFIED REGISTERED

## 2020-03-10 PROCEDURE — 2580000003 HC RX 258: Performed by: OBSTETRICS & GYNECOLOGY

## 2020-03-10 PROCEDURE — 2709999900 HC NON-CHARGEABLE SUPPLY: Performed by: OBSTETRICS & GYNECOLOGY

## 2020-03-10 PROCEDURE — 51701 INSERT BLADDER CATHETER: CPT

## 2020-03-10 PROCEDURE — 3700000000 HC ANESTHESIA ATTENDED CARE: Performed by: OBSTETRICS & GYNECOLOGY

## 2020-03-10 PROCEDURE — 7100000001 HC PACU RECOVERY - ADDTL 15 MIN: Performed by: OBSTETRICS & GYNECOLOGY

## 2020-03-10 PROCEDURE — 3609079900 HC CESAREAN SECTION: Performed by: OBSTETRICS & GYNECOLOGY

## 2020-03-10 PROCEDURE — 1220000000 HC SEMI PRIVATE OB R&B

## 2020-03-10 RX ORDER — FERROUS SULFATE 325(65) MG
325 TABLET ORAL 2 TIMES DAILY WITH MEALS
Status: DISCONTINUED | OUTPATIENT
Start: 2020-03-10 | End: 2020-03-12 | Stop reason: HOSPADM

## 2020-03-10 RX ORDER — SODIUM CHLORIDE 0.9 % (FLUSH) 0.9 %
10 SYRINGE (ML) INJECTION EVERY 12 HOURS SCHEDULED
Status: DISCONTINUED | OUTPATIENT
Start: 2020-03-10 | End: 2020-03-12 | Stop reason: HOSPADM

## 2020-03-10 RX ORDER — FENTANYL CITRATE 50 UG/ML
INJECTION, SOLUTION INTRAMUSCULAR; INTRAVENOUS
Status: COMPLETED
Start: 2020-03-10 | End: 2020-03-10

## 2020-03-10 RX ORDER — DEXAMETHASONE SODIUM PHOSPHATE 4 MG/ML
INJECTION, SOLUTION INTRA-ARTICULAR; INTRALESIONAL; INTRAMUSCULAR; INTRAVENOUS; SOFT TISSUE PRN
Status: DISCONTINUED | OUTPATIENT
Start: 2020-03-10 | End: 2020-03-10 | Stop reason: SDUPTHER

## 2020-03-10 RX ORDER — MIDAZOLAM HYDROCHLORIDE 1 MG/ML
INJECTION INTRAMUSCULAR; INTRAVENOUS PRN
Status: DISCONTINUED | OUTPATIENT
Start: 2020-03-10 | End: 2020-03-10 | Stop reason: SDUPTHER

## 2020-03-10 RX ORDER — ONDANSETRON 2 MG/ML
INJECTION INTRAMUSCULAR; INTRAVENOUS PRN
Status: DISCONTINUED | OUTPATIENT
Start: 2020-03-10 | End: 2020-03-10 | Stop reason: SDUPTHER

## 2020-03-10 RX ORDER — OXYTOCIN 10 [USP'U]/ML
INJECTION, SOLUTION INTRAMUSCULAR; INTRAVENOUS PRN
Status: DISCONTINUED | OUTPATIENT
Start: 2020-03-10 | End: 2020-03-10 | Stop reason: SDUPTHER

## 2020-03-10 RX ORDER — IBUPROFEN 800 MG/1
800 TABLET ORAL EVERY 8 HOURS SCHEDULED
Status: DISCONTINUED | OUTPATIENT
Start: 2020-03-10 | End: 2020-03-12 | Stop reason: HOSPADM

## 2020-03-10 RX ORDER — DIPHENHYDRAMINE HYDROCHLORIDE 50 MG/ML
INJECTION INTRAMUSCULAR; INTRAVENOUS PRN
Status: DISCONTINUED | OUTPATIENT
Start: 2020-03-10 | End: 2020-03-10 | Stop reason: SDUPTHER

## 2020-03-10 RX ORDER — OXYCODONE HYDROCHLORIDE 5 MG/1
5 TABLET ORAL EVERY 4 HOURS PRN
Status: DISCONTINUED | OUTPATIENT
Start: 2020-03-10 | End: 2020-03-12 | Stop reason: HOSPADM

## 2020-03-10 RX ORDER — DOCUSATE SODIUM 100 MG/1
100 CAPSULE, LIQUID FILLED ORAL 2 TIMES DAILY
Status: DISCONTINUED | OUTPATIENT
Start: 2020-03-10 | End: 2020-03-12 | Stop reason: HOSPADM

## 2020-03-10 RX ORDER — OXYCODONE HYDROCHLORIDE AND ACETAMINOPHEN 5; 325 MG/1; MG/1
1 TABLET ORAL EVERY 4 HOURS PRN
Status: DISCONTINUED | OUTPATIENT
Start: 2020-03-10 | End: 2020-03-12 | Stop reason: HOSPADM

## 2020-03-10 RX ORDER — SIMETHICONE 80 MG
80 TABLET,CHEWABLE ORAL EVERY 6 HOURS PRN
Status: DISCONTINUED | OUTPATIENT
Start: 2020-03-10 | End: 2020-03-12 | Stop reason: HOSPADM

## 2020-03-10 RX ORDER — IBUPROFEN 800 MG/1
800 TABLET ORAL EVERY 6 HOURS PRN
Status: DISCONTINUED | OUTPATIENT
Start: 2020-03-11 | End: 2020-03-12 | Stop reason: HOSPADM

## 2020-03-10 RX ORDER — ACETAMINOPHEN 500 MG
1000 TABLET ORAL EVERY 8 HOURS SCHEDULED
Status: DISCONTINUED | OUTPATIENT
Start: 2020-03-10 | End: 2020-03-12 | Stop reason: HOSPADM

## 2020-03-10 RX ORDER — PROMETHAZINE HYDROCHLORIDE 25 MG/ML
INJECTION, SOLUTION INTRAMUSCULAR; INTRAVENOUS PRN
Status: DISCONTINUED | OUTPATIENT
Start: 2020-03-10 | End: 2020-03-10 | Stop reason: SDUPTHER

## 2020-03-10 RX ORDER — METOCLOPRAMIDE HYDROCHLORIDE 5 MG/ML
INJECTION INTRAMUSCULAR; INTRAVENOUS
Status: COMPLETED
Start: 2020-03-10 | End: 2020-03-10

## 2020-03-10 RX ORDER — ONDANSETRON 2 MG/ML
4 INJECTION INTRAMUSCULAR; INTRAVENOUS EVERY 6 HOURS PRN
Status: DISCONTINUED | OUTPATIENT
Start: 2020-03-10 | End: 2020-03-12 | Stop reason: HOSPADM

## 2020-03-10 RX ORDER — ONDANSETRON 2 MG/ML
INJECTION INTRAMUSCULAR; INTRAVENOUS
Status: COMPLETED
Start: 2020-03-10 | End: 2020-03-10

## 2020-03-10 RX ORDER — MORPHINE SULFATE 0.5 MG/ML
INJECTION, SOLUTION EPIDURAL; INTRATHECAL; INTRAVENOUS
Status: COMPLETED
Start: 2020-03-10 | End: 2020-03-10

## 2020-03-10 RX ORDER — KETOROLAC TROMETHAMINE 30 MG/ML
30 INJECTION, SOLUTION INTRAMUSCULAR; INTRAVENOUS EVERY 6 HOURS
Status: DISPENSED | OUTPATIENT
Start: 2020-03-10 | End: 2020-03-12

## 2020-03-10 RX ORDER — PROPOFOL 10 MG/ML
INJECTION, EMULSION INTRAVENOUS PRN
Status: DISCONTINUED | OUTPATIENT
Start: 2020-03-10 | End: 2020-03-10 | Stop reason: SDUPTHER

## 2020-03-10 RX ORDER — LIDOCAINE HYDROCHLORIDE AND EPINEPHRINE BITARTRATE 20; .01 MG/ML; MG/ML
INJECTION, SOLUTION SUBCUTANEOUS PRN
Status: DISCONTINUED | OUTPATIENT
Start: 2020-03-10 | End: 2020-03-10 | Stop reason: SDUPTHER

## 2020-03-10 RX ORDER — OXYCODONE HYDROCHLORIDE 5 MG/1
10 TABLET ORAL EVERY 4 HOURS PRN
Status: DISCONTINUED | OUTPATIENT
Start: 2020-03-10 | End: 2020-03-12 | Stop reason: HOSPADM

## 2020-03-10 RX ORDER — OXYCODONE HYDROCHLORIDE AND ACETAMINOPHEN 5; 325 MG/1; MG/1
2 TABLET ORAL EVERY 4 HOURS PRN
Status: DISCONTINUED | OUTPATIENT
Start: 2020-03-10 | End: 2020-03-12 | Stop reason: HOSPADM

## 2020-03-10 RX ORDER — SODIUM CHLORIDE, SODIUM LACTATE, POTASSIUM CHLORIDE, CALCIUM CHLORIDE 600; 310; 30; 20 MG/100ML; MG/100ML; MG/100ML; MG/100ML
INJECTION, SOLUTION INTRAVENOUS CONTINUOUS
Status: DISCONTINUED | OUTPATIENT
Start: 2020-03-10 | End: 2020-03-11

## 2020-03-10 RX ORDER — SODIUM CHLORIDE 0.9 % (FLUSH) 0.9 %
10 SYRINGE (ML) INJECTION PRN
Status: DISCONTINUED | OUTPATIENT
Start: 2020-03-10 | End: 2020-03-12 | Stop reason: HOSPADM

## 2020-03-10 RX ORDER — MORPHINE SULFATE 0.5 MG/ML
INJECTION, SOLUTION EPIDURAL; INTRATHECAL; INTRAVENOUS PRN
Status: DISCONTINUED | OUTPATIENT
Start: 2020-03-10 | End: 2020-03-10 | Stop reason: SDUPTHER

## 2020-03-10 RX ORDER — DIPHENHYDRAMINE HYDROCHLORIDE 50 MG/ML
25 INJECTION INTRAMUSCULAR; INTRAVENOUS EVERY 6 HOURS PRN
Status: DISCONTINUED | OUTPATIENT
Start: 2020-03-10 | End: 2020-03-12 | Stop reason: HOSPADM

## 2020-03-10 RX ORDER — BUPIVACAINE HYDROCHLORIDE 5 MG/ML
INJECTION, SOLUTION EPIDURAL; INTRACAUDAL PRN
Status: DISCONTINUED | OUTPATIENT
Start: 2020-03-10 | End: 2020-03-10 | Stop reason: SDUPTHER

## 2020-03-10 RX ORDER — KETOROLAC TROMETHAMINE 30 MG/ML
30 INJECTION, SOLUTION INTRAMUSCULAR; INTRAVENOUS EVERY 8 HOURS
Status: DISCONTINUED | OUTPATIENT
Start: 2020-03-10 | End: 2020-03-12 | Stop reason: HOSPADM

## 2020-03-10 RX ORDER — LANOLIN 100 %
OINTMENT (GRAM) TOPICAL
Status: DISCONTINUED | OUTPATIENT
Start: 2020-03-10 | End: 2020-03-12 | Stop reason: HOSPADM

## 2020-03-10 RX ORDER — FENTANYL CITRATE 50 UG/ML
INJECTION, SOLUTION INTRAMUSCULAR; INTRAVENOUS PRN
Status: DISCONTINUED | OUTPATIENT
Start: 2020-03-10 | End: 2020-03-10 | Stop reason: SDUPTHER

## 2020-03-10 RX ORDER — METOCLOPRAMIDE HYDROCHLORIDE 5 MG/ML
INJECTION INTRAMUSCULAR; INTRAVENOUS PRN
Status: DISCONTINUED | OUTPATIENT
Start: 2020-03-10 | End: 2020-03-10 | Stop reason: SDUPTHER

## 2020-03-10 RX ADMIN — OXYTOCIN 10 UNITS: 10 INJECTION INTRAVENOUS at 11:55

## 2020-03-10 RX ADMIN — Medication 100 MCG: at 11:36

## 2020-03-10 RX ADMIN — SODIUM CHLORIDE, POTASSIUM CHLORIDE, SODIUM LACTATE AND CALCIUM CHLORIDE: 600; 310; 30; 20 INJECTION, SOLUTION INTRAVENOUS at 11:33

## 2020-03-10 RX ADMIN — DEXAMETHASONE SODIUM PHOSPHATE 4 MG: 4 INJECTION, SOLUTION INTRAMUSCULAR; INTRAVENOUS at 11:37

## 2020-03-10 RX ADMIN — Medication 15 ML/HR: at 07:01

## 2020-03-10 RX ADMIN — AZITHROMYCIN MONOHYDRATE 500 MG: 500 INJECTION, POWDER, LYOPHILIZED, FOR SOLUTION INTRAVENOUS at 11:23

## 2020-03-10 RX ADMIN — PROPOFOL 10 MG: 10 INJECTION, EMULSION INTRAVENOUS at 11:38

## 2020-03-10 RX ADMIN — Medication 2.5 MILLION UNITS: at 04:40

## 2020-03-10 RX ADMIN — PROPOFOL 10 MG: 10 INJECTION, EMULSION INTRAVENOUS at 11:29

## 2020-03-10 RX ADMIN — METOCLOPRAMIDE HYDROCHLORIDE 10 MG: 5 INJECTION INTRAMUSCULAR; INTRAVENOUS at 10:55

## 2020-03-10 RX ADMIN — CEFAZOLIN SODIUM 2 G: 10 INJECTION, POWDER, FOR SOLUTION INTRAVENOUS at 11:14

## 2020-03-10 RX ADMIN — FENTANYL CITRATE 100 MCG: 50 INJECTION INTRAMUSCULAR; INTRAVENOUS at 02:55

## 2020-03-10 RX ADMIN — KETOROLAC TROMETHAMINE 30 MG: 30 INJECTION, SOLUTION INTRAMUSCULAR at 14:55

## 2020-03-10 RX ADMIN — FAMOTIDINE 20 MG: 10 INJECTION, SOLUTION INTRAVENOUS at 10:55

## 2020-03-10 RX ADMIN — PROMETHAZINE HYDROCHLORIDE 5 MG: 25 INJECTION INTRAMUSCULAR; INTRAVENOUS at 11:33

## 2020-03-10 RX ADMIN — FENTANYL CITRATE 100 MCG: 50 INJECTION INTRAMUSCULAR; INTRAVENOUS at 11:36

## 2020-03-10 RX ADMIN — Medication 1 MILLI-UNITS/MIN: at 06:15

## 2020-03-10 RX ADMIN — Medication 2.5 MILLION UNITS: at 08:10

## 2020-03-10 RX ADMIN — ONDANSETRON 4 MG: 2 INJECTION INTRAMUSCULAR; INTRAVENOUS at 04:15

## 2020-03-10 RX ADMIN — SODIUM CHLORIDE, POTASSIUM CHLORIDE, SODIUM LACTATE AND CALCIUM CHLORIDE: 600; 310; 30; 20 INJECTION, SOLUTION INTRAVENOUS at 16:47

## 2020-03-10 RX ADMIN — MORPHINE SULFATE 2.5 MG: 0.5 INJECTION, SOLUTION EPIDURAL; INTRATHECAL; INTRAVENOUS at 11:42

## 2020-03-10 RX ADMIN — LIDOCAINE HYDROCHLORIDE AND EPINEPHRINE 2 ML: 20; 10 INJECTION, SOLUTION INFILTRATION; PERINEURAL at 11:21

## 2020-03-10 RX ADMIN — PROPOFOL 10 MG: 10 INJECTION, EMULSION INTRAVENOUS at 11:22

## 2020-03-10 RX ADMIN — Medication 2.5 MILLION UNITS: at 00:30

## 2020-03-10 RX ADMIN — LIDOCAINE HYDROCHLORIDE AND EPINEPHRINE 10 ML: 20; 10 INJECTION, SOLUTION INFILTRATION; PERINEURAL at 10:56

## 2020-03-10 RX ADMIN — Medication 100 MCG: at 11:44

## 2020-03-10 RX ADMIN — BUPIVACAINE HYDROCHLORIDE 8 ML: 5 INJECTION, SOLUTION EPIDURAL; INTRACAUDAL; PERINEURAL at 01:17

## 2020-03-10 RX ADMIN — OXYTOCIN 20 UNITS: 10 INJECTION INTRAVENOUS at 11:32

## 2020-03-10 RX ADMIN — KETOROLAC TROMETHAMINE 30 MG: 30 INJECTION, SOLUTION INTRAMUSCULAR at 21:30

## 2020-03-10 RX ADMIN — SODIUM CHLORIDE, POTASSIUM CHLORIDE, SODIUM LACTATE AND CALCIUM CHLORIDE: 600; 310; 30; 20 INJECTION, SOLUTION INTRAVENOUS at 11:55

## 2020-03-10 RX ADMIN — ONDANSETRON 4 MG: 2 INJECTION INTRAMUSCULAR; INTRAVENOUS at 10:55

## 2020-03-10 RX ADMIN — BUPIVACAINE HYDROCHLORIDE 8 ML: 5 INJECTION, SOLUTION EPIDURAL; INTRACAUDAL; PERINEURAL at 02:55

## 2020-03-10 RX ADMIN — MIDAZOLAM 2 MG: 1 INJECTION INTRAMUSCULAR; INTRAVENOUS at 11:36

## 2020-03-10 RX ADMIN — DIPHENHYDRAMINE HYDROCHLORIDE 12.5 MG: 50 INJECTION, SOLUTION INTRAMUSCULAR; INTRAVENOUS at 11:34

## 2020-03-10 ASSESSMENT — PULMONARY FUNCTION TESTS
PIF_VALUE: 0

## 2020-03-10 ASSESSMENT — PAIN SCALES - GENERAL
PAINLEVEL_OUTOF10: 4
PAINLEVEL_OUTOF10: 4

## 2020-03-10 ASSESSMENT — PAIN DESCRIPTION - DESCRIPTORS
DESCRIPTORS: CRAMPING

## 2020-03-10 NOTE — PROGRESS NOTES
Patient pushed for approximately 45 minutes and there is now an anterior lip that swollen. Discussed the clinical situation with the patient. Called complete 6 hours ago and no descent noted on exam.  section recommended, risks reviewed, informed consent obtained.

## 2020-03-10 NOTE — PROGRESS NOTES
S: Pt c/o nausea.    O: cx-complete/+2  toco - ctx q 1-2 min  , Cat I  A/P:  23 y/o    Labor - complete - will start pushing, anticipate   Fetus - tracing reassuring

## 2020-03-10 NOTE — L&D DELIVERY NOTE
Department of Obstetrics and Gynecology   Section Note    Indications: failure to progress - arrest of descent    Pre-operative Diagnosis: 39 week 6 day pregnancy. Post-operative Diagnosis: Living  infant(s) and Male    Surgeon: Padmini Gomez     Assistants: Paul Ravi    Anesthesia: Epidural anesthesia    ASA Class:     Procedure Details   The patient was seen in the Holding Room. The risks, benefits, complications, treatment options, and expected outcomes were discussed with the patient. The patient concurred with the proposed plan, giving informed consent. The site of surgery properly noted/marked. The patient was taken to Operating Room # 2, identified as [facility] and the procedure verified as  Delivery. A Time Out was held and the above information confirmed. After induction of anesthesia, the patient was draped and prepped in the usual sterile manner. A Pfannenstiel incision was made and carried down through the subcutaneous tissue to the fascia. Fascial incision was made and extended transversely. The fascia was  from the underlying rectus tissue superiorly and inferiorly. The peritoneum was identified and entered bluntly. Peritoneal incision was extended longitudinally with traction. The utero-vesical peritoneal reflection was incised transversely and the bladder flap was bluntly freed from the lower uterine segment. A low transverse uterine incision was made. Delivered from [address] [place] [address] presentation was a 3700 gram MALE with Apgar scores of 8 at one minute and 9 at five minutes. After the umbilical cord was clamped and cut cord blood was obtained for evaluation. The placenta was removed intact and appeared normal. The uterine outline, tubes and ovaries appeared normal. The uterine incision was closed with a running suture of 0 Vicryl. A second layer of 0-monocryl was then placed to acquire hemostasis. Lavage was carried out until clear.  The peritoneum was closed with running 3-0 vicryl. The fascia was then reapproximated with running sutures of 0 Vicryl. The sub-Q was closed with 3-0 vicryl suture. The skin was reapproximated with a 4-0 monocryl suture. Instrument, sponge, and needle counts were correct prior the abdominal closure and at the conclusion of the case. Findings:  Normal uterus, tubes and ovaries    Estimated Blood Loss:  800ml           Drains: none           Total IV Fluids: ml           Specimens: none           Implants: none           Complications:  none           Disposition: PACU - hemodynamically stable. Condition: infant stable to general nursery and mother stable    Attending Attestation: I performed the procedure.

## 2020-03-10 NOTE — PROGRESS NOTES
This RN reviewed all documentation and supervised all care done by Saturnino Cantu RN.   Franchesca Gomez

## 2020-03-10 NOTE — PROGRESS NOTES
at bedside to start practice pushing. Per , patient not bringing baby down with practice pushes, will continue to labor down for an hour.

## 2020-03-10 NOTE — PROGRESS NOTES
Pt actively pushing. RN remains in continuous attendance at the bedside assessing fetal heart rate per EFM .

## 2020-03-10 NOTE — LACTATION NOTE
This note was copied from a baby's chart. Lactation Progress Note      Data:    RN requests f/u assistance with latching, baby is tongue sucking and struggling to obtain deep latch onto the breast.     Action: Reviewed tips to improve position of baby to the breast. Encouraged hand expression of colostrum. Pt attempting to hand express. Gave tips to improve technique by hand expressing back further on the breast, away from nipple to assist with easier and more comfortable hand expression. Few drops expressed. Infant with CHELITA after few attempts with SRS and AS. Pt confirms latch feels comfortable and feels gentle tugging. Breast feeding education reinforced. Name and number remains on whiteboard. Encouraged to call for f/u support prn. Response: Pleased with feed and comfortable latch. Infant continues nursing well. Will call for f/u support prn.

## 2020-03-10 NOTE — PROGRESS NOTES
RN continually at bedside from 1584-1874 attempting to obtain accurate FHR on monitor. Patient being repositioned from side to side to find accurate tracing on FHR.  Audible FHR measuring at 135 bpm

## 2020-03-10 NOTE — PROGRESS NOTES
Called  to inform of SVE 8/80/0. Notified of FHR monitoring in the 60's at points but audibly in the 120's still. No new orders at this time. Will continue to monitor.

## 2020-03-10 NOTE — PROGRESS NOTES
Pardeep Jo, NNP notified of pt's prolonged ROM, GBS positive and that FHR is being halfed at times per EFM. NNP notified that her presence at delivery is requested.

## 2020-03-11 LAB
BASOPHILS ABSOLUTE: 0 K/UL (ref 0–0.2)
BASOPHILS RELATIVE PERCENT: 0.3 %
EOSINOPHILS ABSOLUTE: 0 K/UL (ref 0–0.6)
EOSINOPHILS RELATIVE PERCENT: 0.3 %
HCT VFR BLD CALC: 25.4 % (ref 36–48)
HEMOGLOBIN: 8.8 G/DL (ref 12–16)
LYMPHOCYTES ABSOLUTE: 2.3 K/UL (ref 1–5.1)
LYMPHOCYTES RELATIVE PERCENT: 15.1 %
MCH RBC QN AUTO: 29.9 PG (ref 26–34)
MCHC RBC AUTO-ENTMCNC: 34.6 G/DL (ref 31–36)
MCV RBC AUTO: 86.3 FL (ref 80–100)
MONOCYTES ABSOLUTE: 1.3 K/UL (ref 0–1.3)
MONOCYTES RELATIVE PERCENT: 8.7 %
NEUTROPHILS ABSOLUTE: 11.3 K/UL (ref 1.7–7.7)
NEUTROPHILS RELATIVE PERCENT: 75.6 %
PDW BLD-RTO: 12.9 % (ref 12.4–15.4)
PLATELET # BLD: 185 K/UL (ref 135–450)
PMV BLD AUTO: 7.3 FL (ref 5–10.5)
RBC # BLD: 2.95 M/UL (ref 4–5.2)
WBC # BLD: 15 K/UL (ref 4–11)

## 2020-03-11 PROCEDURE — 36415 COLL VENOUS BLD VENIPUNCTURE: CPT

## 2020-03-11 PROCEDURE — 1220000000 HC SEMI PRIVATE OB R&B

## 2020-03-11 PROCEDURE — 2580000003 HC RX 258: Performed by: OBSTETRICS & GYNECOLOGY

## 2020-03-11 PROCEDURE — 85025 COMPLETE CBC W/AUTO DIFF WBC: CPT

## 2020-03-11 PROCEDURE — 6370000000 HC RX 637 (ALT 250 FOR IP): Performed by: OBSTETRICS & GYNECOLOGY

## 2020-03-11 PROCEDURE — 6360000002 HC RX W HCPCS: Performed by: OBSTETRICS & GYNECOLOGY

## 2020-03-11 RX ADMIN — FERROUS SULFATE TAB 325 MG (65 MG ELEMENTAL FE) 325 MG: 325 (65 FE) TAB at 09:53

## 2020-03-11 RX ADMIN — SODIUM CHLORIDE, POTASSIUM CHLORIDE, SODIUM LACTATE AND CALCIUM CHLORIDE: 600; 310; 30; 20 INJECTION, SOLUTION INTRAVENOUS at 00:57

## 2020-03-11 RX ADMIN — IBUPROFEN 800 MG: 800 TABLET, FILM COATED ORAL at 22:27

## 2020-03-11 RX ADMIN — DOCUSATE SODIUM 100 MG: 100 CAPSULE, LIQUID FILLED ORAL at 09:53

## 2020-03-11 RX ADMIN — Medication 10 ML: at 05:40

## 2020-03-11 RX ADMIN — OXYCODONE 5 MG: 5 TABLET ORAL at 18:54

## 2020-03-11 RX ADMIN — Medication 10 ML: at 09:20

## 2020-03-11 RX ADMIN — ACETAMINOPHEN 1000 MG: 500 TABLET ORAL at 02:28

## 2020-03-11 RX ADMIN — FERROUS SULFATE TAB 325 MG (65 MG ELEMENTAL FE) 325 MG: 325 (65 FE) TAB at 18:54

## 2020-03-11 RX ADMIN — KETOROLAC TROMETHAMINE 30 MG: 30 INJECTION, SOLUTION INTRAMUSCULAR at 05:40

## 2020-03-11 RX ADMIN — ACETAMINOPHEN 1000 MG: 500 TABLET ORAL at 09:52

## 2020-03-11 RX ADMIN — ACETAMINOPHEN 1000 MG: 500 TABLET ORAL at 17:53

## 2020-03-11 RX ADMIN — IBUPROFEN 800 MG: 800 TABLET, FILM COATED ORAL at 13:22

## 2020-03-11 ASSESSMENT — PAIN SCALES - GENERAL
PAINLEVEL_OUTOF10: 5
PAINLEVEL_OUTOF10: 5
PAINLEVEL_OUTOF10: 6
PAINLEVEL_OUTOF10: 6
PAINLEVEL_OUTOF10: 7
PAINLEVEL_OUTOF10: 5
PAINLEVEL_OUTOF10: 8

## 2020-03-11 NOTE — ANESTHESIA POSTPROCEDURE EVALUATION
Department of Anesthesiology  Postprocedure Note    Patient: Tad Schlatter  MRN: [id number]  YOB: 2000  Date of evaluation: 3/11/2020  Time:  5:11 PM     Procedure Summary     Date:  20 Room / Location:  [facility] L&D OR [address] [place] [address]    Anesthesia Start:  56 Anesthesia Stop:  03/10/20 1208    Procedures:        SECTION (N/A Abdomen)      Labor Analgesia Diagnosis:  (Arrest of Descent)    Surgeon:  Eleni Vazquez MD Responsible Provider:  Jacques Warren MD    Anesthesia Type:  epidural ASA Status:  2          Anesthesia Type: No value filed. Rosa Maria Phase I: Rosa Maria Score: 9    Rosa Maria Phase II:      Last vitals: Reviewed and per EMR flowsheets. Anesthesia Post Evaluation    Patient location during evaluation: bedside  Note status: Pt. nursing with AM attempt and sleeping with afteroon attempt. Post-procedure mental status: sleeping. Nausea & Vomiting: no nausea and no vomiting  Complications: no  Cardiovascular status: hemodynamically stable  Hydration status: stable  Comments: Pt. Resting .  Notes and flowsheet reviewed {t stable with no apparent anesthesia complications

## 2020-03-11 NOTE — PLAN OF CARE
Problem: Anxiety:  Goal: Level of anxiety will decrease  Description: Level of anxiety will decrease  3/11/2020 0426 by Ochoa Tavera RN  Outcome: Completed  3/11/2020 0044 by Sonali Velazquez RN  Outcome: Ongoing     Problem: Aspiration - Risk of:  Goal: Absence of aspiration  Description: Absence of aspiration  3/11/2020 0426 by Ochoa Tavera RN  Outcome: Completed  3/11/2020 0044 by Sonali Velazquez RN  Outcome: Ongoing     Problem: Tissue Perfusion - Uteroplacental, Altered:  Goal: Absence of abnormal fetal heart rate pattern  Description: Absence of abnormal fetal heart rate pattern  3/11/2020 0044 by Sonali Velazquez RN  Outcome: Completed     Problem: Venous Thromboembolism - Risk of:  Goal: Will show no signs or symptoms of venous thromboembolism  Description: Will show no signs or symptoms of venous thromboembolism  3/11/2020 0426 by Ochoa Tavera RN  Outcome: Completed  3/11/2020 0044 by Sonali Velazquez RN  Outcome: Ongoing

## 2020-03-11 NOTE — PLAN OF CARE
Problem: Fluid Volume - Imbalance:  Goal: Absence of postpartum hemorrhage signs and symptoms  Description: Absence of postpartum hemorrhage signs and symptoms  Outcome: Ongoing  Goal: Absence of imbalanced fluid volume signs and symptoms  Description: Absence of imbalanced fluid volume signs and symptoms  Outcome: Ongoing     Problem: Infection - Surgical Site:  Goal: Will show no infection signs and symptoms  Description: Will show no infection signs and symptoms  Outcome: Ongoing     Problem: Mood - Altered:  Goal: Mood stable  Description: Mood stable  Outcome: Ongoing     Problem: Nausea/Vomiting:  Goal: Absence of nausea/vomiting  Description: Absence of nausea/vomiting  Outcome: Ongoing     Problem: Pain - Acute:  Goal: Pain level will decrease  Description: Pain level will decrease  Outcome: Ongoing     Problem: Urinary Retention:  Goal: Urinary elimination within specified parameters  Description: Urinary elimination within specified parameters  Outcome: Ongoing     Problem: Venous Thromboembolism:  Goal: Will show no signs or symptoms of venous thromboembolism  Description: Will show no signs or symptoms of venous thromboembolism  Outcome: Ongoing  Goal: Absence of signs or symptoms of impaired coagulation  Description: Absence of signs or symptoms of impaired coagulation  Outcome: Ongoing

## 2020-03-11 NOTE — PROGRESS NOTES
Bedside report received. White board updated. Pt sitting in bed holding infant. Pt is alert and orientated at this time. Pt denies any needs. Will continue to monitor.

## 2020-03-11 NOTE — LACTATION NOTE
This note was copied from a baby's chart. Lactation Progress Note      Data:   F/U with primip 1PO with breast feeding and lactation rounds. MOB states that infant is having trouble sustaining latch onto breast. States overnight infant had several attempts at breast, but feels that infant does not have a good latch. MOB has been hand expressing gtts of colostrum for infant frequently and is attempting to feed infant at time of consult. Infant output established, weight loss @2%. Action: Introduced self to patient as Lactation RN, name and phone number written on white board in room. MOB needing reinforcement on how to support breast and bring infant onto breast with wide open mouth. MOB holding onto shallow onto nipple causing infant not to open wide and get a good latch onto breast. This was pointed out to mob and LC assisted with better support and positioning infant to breast. Observed infant attempting to latch onto flat nipple despite gentle stimulation given. Infant uncoordinated with suck burst and using tongue to suck shallow onto nipple. Suck training done with gloved finger. \"Chompy\" with initial suck burst and pushing finger out of mouth. After few minutes, infant with better suction using tongue appropriately with good persistalitc motion noted. Infant then placed back to left breast in football hold. Nipple shield used to help infant to pull nipple out and sustain latch with suck burst. After a few attempts, CHELITA was achieved with SRS observed and AS noted over the next 15 minutes and continues after consult. MOB needs reinforcement throughout feeding to provide breast support and making sure infant does not slip off nipple sucking shallow. Nipple shield use discussed with mob and education provided on use, and how to wean from shield. Reviewed with mother what to expect over the next  24-48 hours with infant feedings, infant output, and breast care.  Educated mother on how to hand express

## 2020-03-12 VITALS
SYSTOLIC BLOOD PRESSURE: 112 MMHG | WEIGHT: 171 LBS | DIASTOLIC BLOOD PRESSURE: 59 MMHG | OXYGEN SATURATION: 96 % | HEIGHT: 60 IN | HEART RATE: 106 BPM | TEMPERATURE: 97.7 F | BODY MASS INDEX: 33.57 KG/M2 | RESPIRATION RATE: 16 BRPM

## 2020-03-12 PROCEDURE — 6370000000 HC RX 637 (ALT 250 FOR IP): Performed by: OBSTETRICS & GYNECOLOGY

## 2020-03-12 RX ORDER — OXYCODONE HYDROCHLORIDE 5 MG/1
5 TABLET ORAL EVERY 6 HOURS PRN
Qty: 28 TABLET | Refills: 0 | Status: SHIPPED | OUTPATIENT
Start: 2020-03-12 | End: 2020-03-19

## 2020-03-12 RX ORDER — FERROUS SULFATE 325(65) MG
325 TABLET ORAL 2 TIMES DAILY WITH MEALS
Qty: 60 TABLET | Refills: 3 | Status: SHIPPED | OUTPATIENT
Start: 2020-03-12

## 2020-03-12 RX ORDER — IBUPROFEN 800 MG/1
800 TABLET ORAL EVERY 8 HOURS PRN
Qty: 60 TABLET | Refills: 0 | Status: SHIPPED | OUTPATIENT
Start: 2020-03-12 | End: 2020-10-01

## 2020-03-12 RX ADMIN — OXYCODONE 5 MG: 5 TABLET ORAL at 14:40

## 2020-03-12 RX ADMIN — IBUPROFEN 800 MG: 800 TABLET, FILM COATED ORAL at 09:21

## 2020-03-12 RX ADMIN — FERROUS SULFATE TAB 325 MG (65 MG ELEMENTAL FE) 325 MG: 325 (65 FE) TAB at 10:41

## 2020-03-12 RX ADMIN — DOCUSATE SODIUM 100 MG: 100 CAPSULE, LIQUID FILLED ORAL at 09:21

## 2020-03-12 RX ADMIN — ACETAMINOPHEN 1000 MG: 500 TABLET ORAL at 10:41

## 2020-03-12 ASSESSMENT — PAIN SCALES - GENERAL
PAINLEVEL_OUTOF10: 8
PAINLEVEL_OUTOF10: 7

## 2020-03-12 NOTE — PROGRESS NOTES
ID bands checked. Infant's ID band and Mother's matching ID bands removed and taped to discharge sheet, mother verifies as correct and witnessed by RN. Umbilical clamp and HUGS tags removed. Infant placed in car seat by parent. Discharged in stable condition to personal vehicle per wheel chair with infant in car seat on mother's lap.

## 2020-03-12 NOTE — PROGRESS NOTES
Discharge Phone Call Log  Patient Name: 40 [address] [place] [address] Provider: Deisy Lopes MD Discharge Date: 3/12/2020    Disposition of baby:    Phone Number: [phone] (home)     Attempts to Contact:  Date:    Nurse  Date:    Nurse  Date:    Nurse    1. Now that you are at home is your pain being well controlled? Y/N   What pain reducing measures are you using? ____________________________________        Information for the patient's :  Shalonda Tripathi [[id number]]   Delivery Method: , Low Transverse    2. Are you currently  having any infant feeding issues? Y/N _____________________________ If yes, please explain: __________________________________________________________________  3. If breastfeeding, were you satisfied with the breastfeeding support services offered? Y/N  4.  Have you had to supplement? Y/N If yes, please explain: _____________________________________________________  5. Did your OB provider offer you information about the benefits of breastfeeding during your prenatal visits? Y/N  6.  Have you made or have you already had your first appointment with the baby's doctor? Y/N If no, do you know when to schedule it? Y/N   7.  Have you scheduled your follow-up appointment? Y/N  If no, do you know when to schedule it? Y/N  8. Did staff discuss safe sleep during your stay? Y/N  Did you see the wall cling posted in your room explaining how to keep you and your baby safe? Y/N  9. Can you tell me at least 1 point you learned from reading or hearing about infant safety and safe sleep practices? 10. Did your nurses and physicians include you in the plan of care, communicating with you respectfully? Y/N If no, please explain __________________________  11. Is there anyone in particular you would like to mention who provided care for you? ________________________________  12. Did your discharge occur in a timely manner?   Y/N If no, please explain __________________________  13. Do you have any other questions or concerns I can address today?  Y/N  __________________________________________________      Teaching During interview :_____________________________________________  ___________________________RN       Date:______________Time:________________

## 2020-03-12 NOTE — PROGRESS NOTES
OBGYN Attending Note  Postop C/S     Pt is a 22 y.o.  POD2 s/p primary C/S 2/2 AOD. Pt is recovering well. Pain well controlled, was sleeping comfortably. Lochia appropriate. Tolerating diet. Ambulating, voiding. Vitals:    20 0535 20 0827 20 1645 20 2231   BP: 100/67 (!) 101/56 (!) 108/55 (!) 104/53   Pulse: 100 90 110 88   Resp: 16 18 17 16   Temp: 98.1 °F (36.7 °C) 97.9 °F (36.6 °C) 98.4 °F (36.9 °C) 97.7 °F (36.5 °C)   TempSrc: Oral Oral Oral Temporal   SpO2:       Weight:       Height:         Abdomen soft, minimally tender, incision c/d/i  Ext: non tender, warm well perfused     hgb  8.8    A/P: POD 2 s/p primary C/S 2/2 AOD.   Vitals wnl  Continue routine postop care  Pt stable for discharge, instructions reviewed    Mady Morillo MD

## 2020-03-12 NOTE — PROGRESS NOTES
Discharge instructions reviewed at this time with patient and significant other. Appropriate questions asked. Patient and family ready for discharge.

## 2020-03-12 NOTE — PLAN OF CARE
Problem: Mood - Altered:  Goal: Mood stable  Description: Mood stable  Outcome: Ongoing     Problem: Pain - Acute:  Goal: Pain level will decrease  Description: Pain level will decrease  Outcome: Ongoing     Problem: Urinary Retention:  Goal: Urinary elimination within specified parameters  Description: Urinary elimination within specified parameters  Outcome: Ongoing     Problem: Venous Thromboembolism:  Goal: Will show no signs or symptoms of venous thromboembolism  Description: Will show no signs or symptoms of venous thromboembolism  Outcome: Ongoing

## 2020-10-01 ENCOUNTER — APPOINTMENT (OUTPATIENT)
Dept: GENERAL RADIOLOGY | Age: 20
End: 2020-10-01
Payer: MEDICAID

## 2020-10-01 ENCOUNTER — HOSPITAL ENCOUNTER (EMERGENCY)
Age: 20
Discharge: HOME | End: 2020-10-01
Payer: MEDICAID

## 2020-10-01 VITALS
WEIGHT: 170 LBS | SYSTOLIC BLOOD PRESSURE: 119 MMHG | BODY MASS INDEX: 33.2 KG/M2 | OXYGEN SATURATION: 99 % | HEART RATE: 84 BPM | RESPIRATION RATE: 16 BRPM | DIASTOLIC BLOOD PRESSURE: 80 MMHG | TEMPERATURE: 98.5 F

## 2020-10-01 PROCEDURE — 73610 X-RAY EXAM OF ANKLE: CPT

## 2020-10-01 PROCEDURE — 73630 X-RAY EXAM OF FOOT: CPT

## 2020-10-01 PROCEDURE — 6370000000 HC RX 637 (ALT 250 FOR IP): Performed by: NURSE PRACTITIONER

## 2020-10-01 PROCEDURE — 99283 EMERGENCY DEPT VISIT LOW MDM: CPT

## 2020-10-01 RX ORDER — NAPROXEN 500 MG/1
500 TABLET ORAL 2 TIMES DAILY PRN
Qty: 30 TABLET | Refills: 0 | Status: SHIPPED | OUTPATIENT
Start: 2020-10-01

## 2020-10-01 RX ORDER — TRAMADOL HYDROCHLORIDE 50 MG/1
50 TABLET ORAL ONCE
Status: COMPLETED | OUTPATIENT
Start: 2020-10-01 | End: 2020-10-01

## 2020-10-01 RX ADMIN — TRAMADOL HYDROCHLORIDE 50 MG: 50 TABLET, FILM COATED ORAL at 14:11

## 2020-10-01 ASSESSMENT — PAIN SCALES - GENERAL
PAINLEVEL_OUTOF10: 4
PAINLEVEL_OUTOF10: 8
PAINLEVEL_OUTOF10: 8
PAINLEVEL_OUTOF10: 5

## 2020-10-01 ASSESSMENT — PAIN DESCRIPTION - PAIN TYPE: TYPE: ACUTE PAIN

## 2020-10-01 ASSESSMENT — PAIN DESCRIPTION - LOCATION: LOCATION: ANKLE;ARM

## 2020-10-01 ASSESSMENT — PAIN DESCRIPTION - ORIENTATION: ORIENTATION: LEFT;RIGHT

## 2020-10-01 NOTE — ED NOTES
Pt placed in 4in strapping ace wrap/air cast placed to left ankle for comfort.      Artem Lopez LPN  63/00/57 [id number]

## 2020-10-01 NOTE — LETTER
[facility]  [address] [address]  Phone: [phone]  Fax: [phone]               October 1, 2020    Patient: Tanya Toro   YOB: 2000   Date of Visit: 10/1/2020       To Whom It May Concern:    Osvaldo Moseley was seen and treated in our emergency department on 10/1/2020. She may return to work on 10/5/2020.       Sincerely,       DEEP Raymond CNP         Signature:__________________________________

## 2020-10-01 NOTE — ED PROVIDER NOTES
[facility] Emergency Department    CHIEF COMPLAINT  Ankle Pain (states rolled ankle a couple weeks ago and not getting any better hard to put weight on) and Numbness (states numbness and pain in R arm off and on for two weeks )      SHARED SERVICE VISIT:  Evaluated by SAMMY. My supervising physician was available for consultation. HISTORY OF PRESENT ILLNESS  Paul Locke is a 23 y.o. female who presents to the ED complaining of a 2-week history of \"sharp and throbbing\" 8/10 left ankle pain status post she \"rolled her ankle\". She states the pain has been progressively worsening and has not improved over the 2 weeks since the initial insult.  + Weightbearing but with antalgic gait. She has tried ibuprofen without relief of her symptoms. Also complains of right hand and wrist \"numbness\" that is worse in the morning and later in the day. 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 No other complaints, modifying factors or associated symptoms. Nursing notes reviewed.    Past Medical History:   Diagnosis Date    ADHD (attention deficit hyperactivity disorder)     Asthma     Chlamydia infection 2019    Influenza B 2020     Past Surgical History:   Procedure Laterality Date     SECTION N/A 3/10/2020     SECTION performed by Fabiano West MD at [address] [place] [address] L&D OR    TUMOR REMOVAL  2019    left side of throat     WISDOM TOOTH EXTRACTION       Family History   Adopted: Yes     Social History     Socioeconomic History    Marital status: Single     Spouse name: Not on file    Number of children: Not on file    Years of education: Not on file    Highest education level: Not on file   Occupational History    Not on file   Social Needs    Financial resource strain: Not on file    Food insecurity     Worry: Not on file     Inability: Not on file    Transportation needs     Medical: Not on file     Non-medical: Not on Resp 18   Wt 170 lb (77.1 kg)   SpO2 97%   BMI 33.20 kg/m²   GENERAL APPEARANCE: Awake and alert. Cooperative. No acute distress. HEAD: Normocephalic. Atraumatic. EYES: PERRL. EOM's grossly intact. ENT: Mucous membranes are moist.   NECK: Supple. Normal ROM. CHEST: Equal symmetric chest rise. LUNGS: Breathing is unlabored. Speaking comfortably in full sentences. Abdomen: Nondistended  EXTREMITIES: MAEE. No acute deformities. + left lateral ankle and lateral left hind foot tenderness upon palpation. + Phalen's and Tinel's signs in respect to right hand. SKIN: Warm and dry. NEUROLOGICAL: Alert and oriented. Strength is 5/5 in all extremities and sensation is intact. RADIOLOGY  No results found. ED COURSE   Patient received Ultram for pain, with good relief. A discussion was had with Ms. Sammy Funes regarding left ankle pain, left foot pain, and right wrist pain. Risk management discussed and shared decision making had with patient and/or surrogate. All questions were answered. Patient will follow up with PCP and orthopedics for further evaluation/treatment. Patient will return to ED for new/worsening symptoms. Patient was sent home with a prescription for naproxen. MDM  Patient is to emergency department with left ankle pain, leg pain, and right wrist pain. Alternative diagnoses are less likely based on history and physical. Considered fracture/dislocation of left foot and ankle but no radiographic evidence of osseous abnormality. Considered distal radius fracture but less likely as patient denies trauma, there is full range of motion, and no tenderness upon palpation. Impression:  Left ankle pain  Foot pain  Right wrist pain      DISPOSITION  Patient was discharged to home in good condition.             Norris Crum, DEEP - CNP  10/02/20 0000

## 2020-10-07 ENCOUNTER — OFFICE VISIT (OUTPATIENT)
Dept: ORTHOPEDIC SURGERY | Age: 20
End: 2020-10-07
Payer: MEDICAID

## 2020-10-07 ENCOUNTER — TELEPHONE (OUTPATIENT)
Dept: ORTHOPEDIC SURGERY | Age: 20
End: 2020-10-07

## 2020-10-07 VITALS — BODY MASS INDEX: 33.38 KG/M2 | WEIGHT: 170 LBS | HEIGHT: 60 IN

## 2020-10-07 PROCEDURE — 4004F PT TOBACCO SCREEN RCVD TLK: CPT | Performed by: PODIATRIST

## 2020-10-07 PROCEDURE — G8484 FLU IMMUNIZE NO ADMIN: HCPCS | Performed by: PODIATRIST

## 2020-10-07 PROCEDURE — G8427 DOCREV CUR MEDS BY ELIG CLIN: HCPCS | Performed by: PODIATRIST

## 2020-10-07 PROCEDURE — G8417 CALC BMI ABV UP PARAM F/U: HCPCS | Performed by: PODIATRIST

## 2020-10-07 PROCEDURE — L4360 PNEUMAT WALKING BOOT PRE CST: HCPCS | Performed by: PODIATRIST

## 2020-10-07 PROCEDURE — 99203 OFFICE O/P NEW LOW 30 MIN: CPT | Performed by: PODIATRIST

## 2020-10-07 NOTE — LETTER
[address]  Phone: [phone]  Fax: [phone]    [place]        October 7, 2020     Patient: Tanya Toro   YOB: 2000   Date of Visit: 10/7/2020       To Whom It May Concern: It is my medical opinion that [place] may return to work on 10/7/20. Sit down job only for 3 weeks. If you have any questions or concerns, please don't hesitate to call.     Sincerely,         BENNY Plummer DPM

## 2020-10-07 NOTE — PROGRESS NOTES
HISTORY OF PRESENT ILLNESS: This is an initial visit for a 55-year-old female with a chief complaint of left lateral ankle and foot pain. The  patient twisted the ankle and foot approximately 2 weeks ago when she stepped into a shallow hole in her yard. She was fitted for an ankle brace however, she states that that has not been helping and her pain level has not changed since the original injury. She has been working at [employer] and that greatly aggravates the pain after her shift. Pain is present with weightbearing  and twisting motions of the ankle. The pain is best relieved with rest, ice, and  elevation. FAMILY HISTORY: Documented in chart. SOCIAL HISTORY:  Documented in chart. REVIEW OF SYSTEMS: The patient denies any fever, chills, or night sweats. The patient also denies developing any type of rash. The patient denies any problems with cardiovascular, pulmonary, gastrointestinal, neurologic, urologic, genitourinary, psychiatric, dermatologic, and HEENT systems. PHYSICAL EXAMINATION: The area of greatest palpable tenderness is at the  left lateral ankle, over the ATF ligament. She has moderate pain also over the dorsal lateral aspect of the left midfoot. An anterior drawer test does  not reproduce any gross instability. There is mild edema of the  lateral ankle. There is no erythema or ecchymosis of the foot or ankle. No open lesions or fracture blisters are present. Pedal pulses are palpable bilateral.  The sensation is grossly intact bilateral.  There is no pain over the deltoid  ligament. There is no pain over the Achilles tendon and this is palpated to be  intact. Thompsonâs test is negative for a complete rupture of the Achilles  tendon. The patient is able to dorsiflex and plantarflex the foot, as well as  adan and invert independently. RADIOGRAPHS: 3 weightbearing x-ray views of the left ankle were evaluated. No acute fractures or dislocations are noted.  The ankle joint is in  excellent alignment. 3 weightbearing x-ray views of the left foot were evaluated. No acute fracture or dislocations are noted. There is no diastases noted between the first and second metatarsal bases. ASSESSMENT: Left Lateral Ankle and Midfoot Sprain      PLAN: The patient was educated on the pathology and its treatment options. A high-tide walker was applied to the patientâs left lower extremity. It was  advised that sleeping with the boot on is desirable for better healing of the  ligament and symptom reduction. The patient is allowed to bear weight as  tolerated. Range of motion exercises were prescribed to the patient. Rest, ice,  and elevation are to be used to relieve pain. Overall activity is to be decreased  in the short-term. Work restrictions of sitdown job only work given for 3 weeks. The patient will return in one week for reevaluation. Procedures    Haylieg / Brooke Cannony Tall Walking Boot     Patient was prescribed a Tall Walking Boot. The left foot will require stabilization / immobilization from this semi-rigid / rigid orthosis to improve their function. The orthosis will assist in protecting the affected area, provide functional support and facilitate healing. Patient was instructed to progress ambulation weight bearing as tolerated in the device. The patient was educated and fit by a healthcare professional with expert knowledge and specialization in brace application while under the direct supervision of the physician. Verbal and written instructions for the use of and application of this item were provided. They were instructed to contact the office immediately should the brace result in increased pain, decreased sensation, increased swelling or worsening of the condition.

## 2020-10-07 NOTE — TELEPHONE ENCOUNTER
[place] [facility] [place]  NO PRECERT REQUIRED - PER ONLINE [facility] -   MP

## 2020-10-14 ENCOUNTER — OFFICE VISIT (OUTPATIENT)
Dept: ORTHOPEDIC SURGERY | Age: 20
End: 2020-10-14
Payer: MEDICAID

## 2020-10-14 VITALS — BODY MASS INDEX: 33.38 KG/M2 | HEIGHT: 60 IN | WEIGHT: 170 LBS

## 2020-10-14 PROCEDURE — G8417 CALC BMI ABV UP PARAM F/U: HCPCS | Performed by: PODIATRIST

## 2020-10-14 PROCEDURE — 99213 OFFICE O/P EST LOW 20 MIN: CPT | Performed by: PODIATRIST

## 2020-10-14 PROCEDURE — G8427 DOCREV CUR MEDS BY ELIG CLIN: HCPCS | Performed by: PODIATRIST

## 2020-10-14 PROCEDURE — 4004F PT TOBACCO SCREEN RCVD TLK: CPT | Performed by: PODIATRIST

## 2020-10-14 PROCEDURE — G8484 FLU IMMUNIZE NO ADMIN: HCPCS | Performed by: PODIATRIST

## 2020-10-14 NOTE — PROGRESS NOTES
HISTORY OF PRESENT ILLNESS: This is a followup for a left lateral ankle and midfoot sprain. She is feeling much better and can now weight-bear while in the boot. PHYSICAL EXAMINATION: She continues having some mild pain over the lateral ankle ligaments. There are no new areas of pain. There is mild edema of the lateral ankle. No open lesions or fracture blisters are present. Pedal pulses are palpable bilateral.  The sensation is grossly intact bilateral. There is no pain over the deltoid  ligament. There is no pain over the Achilles tendon and this is palpated to be  intact. Thompsonâs test is negative for a complete rupture of the Achilles  tendon. There is no pain over the peroneal tendons. Range of motion at the ankle does not demonstrate subluxation of the peroneal tendons. The patient is able to dorsiflex and plantarflex the foot, as well as adan and invert independently. ASSESSMENT: Left Lateral Ankle and Midfoot Sprain      PLAN: I prescribed her physical therapy to begin rehab of the left foot and ankle. She will eventually transition back into her ankle brace. She can return to work anytime she feels she can tolerate it. I will see her back in approximately 3-4 weeks.

## 2021-12-10 ENCOUNTER — HOSPITAL ENCOUNTER (EMERGENCY)
Age: 21
Discharge: HOME | End: 2021-12-10
Payer: MEDICAID

## 2021-12-10 ENCOUNTER — APPOINTMENT (OUTPATIENT)
Dept: GENERAL RADIOLOGY | Age: 21
End: 2021-12-10
Payer: MEDICAID

## 2021-12-10 VITALS
TEMPERATURE: 98.6 F | SYSTOLIC BLOOD PRESSURE: 122 MMHG | OXYGEN SATURATION: 98 % | RESPIRATION RATE: 16 BRPM | DIASTOLIC BLOOD PRESSURE: 95 MMHG | BODY MASS INDEX: 33.38 KG/M2 | HEIGHT: 60 IN | WEIGHT: 170 LBS | HEART RATE: 70 BPM

## 2021-12-10 DIAGNOSIS — U07.1 COVID-19 VIRUS INFECTION: Primary | ICD-10-CM

## 2021-12-10 LAB — SARS-COV-2, NAAT: DETECTED

## 2021-12-10 PROCEDURE — 71045 X-RAY EXAM CHEST 1 VIEW: CPT

## 2021-12-10 PROCEDURE — 87635 SARS-COV-2 COVID-19 AMP PRB: CPT

## 2021-12-10 PROCEDURE — 99283 EMERGENCY DEPT VISIT LOW MDM: CPT

## 2021-12-10 PROCEDURE — 6370000000 HC RX 637 (ALT 250 FOR IP): Performed by: PHYSICIAN ASSISTANT

## 2021-12-10 RX ORDER — DEXAMETHASONE 6 MG/1
6 TABLET ORAL DAILY
Qty: 5 TABLET | Refills: 0 | Status: SHIPPED | OUTPATIENT
Start: 2021-12-10 | End: 2021-12-15

## 2021-12-10 RX ADMIN — Medication 1 LOZENGE: at 23:04

## 2021-12-10 ASSESSMENT — PAIN SCALES - GENERAL: PAINLEVEL_OUTOF10: 10

## 2021-12-10 ASSESSMENT — PAIN DESCRIPTION - PAIN TYPE: TYPE: ACUTE PAIN

## 2021-12-10 ASSESSMENT — PAIN DESCRIPTION - LOCATION: LOCATION: THROAT

## 2021-12-11 NOTE — ED PROVIDER NOTES
[facility]  ED  EMERGENCY DEPARTMENT ENCOUNTER        Pt Name: Kehinde Carbajal  MRN: [id number]  Steffengfsha 2000  Date of evaluation: 12/10/2021  Provider: Meggan Mendiola PA-C  PCP: No primary care provider on file. Note Started: 9:51 PM EST       SAMMY. I have evaluated this patient. My supervising physician was available for consultation. Aneudy Guzman MD      CHIEF COMPLAINT       Chief Complaint   Patient presents with    Shortness of Breath     starting Saturday, cough - sputum is \"really chunky and green\"    Pharyngitis     \"my neck is tender to touch, I can't swallow, I saw something on the back of my throat\"       HISTORY OF PRESENT ILLNESS   (Location, Timing/Onset, Context/Setting, Quality, Duration, Modifying Factors, Severity, Associated Signs and Symptoms)  Note limiting factors. Chief Complaint: Nasal congestion, sore throat left and cough. Kehinde Carbajal is a 19 y.o. female who presents onset 1 week ago. Accompanied by boyfriend and child. Patient indicates above symptoms with mild progression. Mostly sore throat on the left. States she sees an ulcer back there. She indicates no gastrointestinal or urinary complaints. Cough is occasional and nonproductive. She does smoke. She does have history of asthma. She has not had the Covid vaccination. Nursing Notes were all reviewed and agreed with or any disagreements were addressed in the HPI. REVIEW OF SYSTEMS    (2-9 systems for level 4, 10 or more for level 5)     Review of Systems    Positives and Pertinent negatives as per HPI. Except as noted above in the ROS, all other systems were reviewed and negative.        PAST MEDICAL HISTORY     Past Medical History:   Diagnosis Date    ADHD (attention deficit hyperactivity disorder)     Asthma     Chlamydia infection 09/22/2019    Influenza B 02/16/2020         SURGICAL HISTORY     Past Surgical History:   Procedure Laterality Date     SECTION N/A 3/10/2020     SECTION performed by Dorcas [facility]o MD at [facility] L&D OR    TUMOR REMOVAL  2019    left side of throat     WISDOM TOOTH EXTRACTION           CURRENTMEDICATIONS       Previous Medications    FAMOTIDINE (PEPCID) 10 MG TABLET    Take 10 mg by mouth 2 times daily as needed    FERROUS SULFATE (IRON 325) 325 (65 FE) MG TABLET    Take 1 tablet by mouth 2 times daily (with meals)    METOCLOPRAMIDE (REGLAN) 5 MG TABLET    Take 5 mg by mouth 4 times daily    NAPROXEN (NAPROSYN) 500 MG TABLET    Take 1 tablet by mouth 2 times daily as needed for Pain    PRENATAL VIT-FE FUMARATE-FA (PRENATAL VITAMINS) 28-0.8 MG TABS    Take 1 tablet by mouth daily         ALLERGIES     Patient has no known allergies. FAMILYHISTORY       Family History   Adopted: Yes          SOCIAL HISTORY       Social History     Tobacco Use    Smoking status: Current Some Day Smoker     Packs/day: 0.25     Types: Cigarettes    Smokeless tobacco: Never Used    Tobacco comment: quit a few months ago   Vaping Use    Vaping Use: Some days    Substances: Always   Substance Use Topics    Alcohol use: Yes     Alcohol/week: 1.0 standard drink     Types: 1 Glasses of wine per week    Drug use: Not Currently     Types: Marijuana (Weed)       SCREENINGS             PHYSICAL EXAM    (up to 7 for level 4, 8 or more for level 5)     ED Triage Vitals [12/10/21 2007]   BP Temp Temp Source Pulse Resp SpO2 Height Weight   (!) 124/95 98.6 °F (37 °C) Oral 75 18 98 % 5' (1.524 m) 170 lb (77.1 kg)       Physical Exam  Vitals and nursing note reviewed. Constitutional:       Appearance: Normal appearance. She is well-developed. She is obese. HENT:      Head: Normocephalic and atraumatic. Right Ear: Tympanic membrane, ear canal and external ear normal.      Left Ear: Tympanic membrane, ear canal and external ear normal.      Nose: Congestion present.       Mouth/Throat:      Pharynx: Posterior oropharyngeal erythema present. Comments: Patient with a viral ulceration noted on the left peritonsillar region. No evidence of peritonsillar abscess. No exudate. Eyes:      General: No scleral icterus. Right eye: No discharge. Left eye: No discharge. Conjunctiva/sclera: Conjunctivae normal.   Cardiovascular:      Rate and Rhythm: Normal rate and regular rhythm. Heart sounds: Normal heart sounds. Pulmonary:      Effort: Pulmonary effort is normal.      Breath sounds: Normal breath sounds. Musculoskeletal:         General: Normal range of motion. Cervical back: Normal range of motion and neck supple. Lymphadenopathy:      Cervical: Cervical adenopathy present. Skin:     General: Skin is warm and dry. Findings: No rash. Neurological:      General: No focal deficit present. Mental Status: She is alert and oriented to person, place, and time. Mental status is at baseline. Psychiatric:         Mood and Affect: Mood normal.         Behavior: Behavior normal.         Thought Content: Thought content normal.         Judgment: Judgment normal.         DIAGNOSTIC RESULTS   LABS:    Labs Reviewed   COVID-19, RAPID - Abnormal; Notable for the following components:       Result Value    SARS-CoV-2, NAAT DETECTED (*)     All other components within normal limits    Narrative:     Rogers Felix tel. [phone],  Microbiology results called to and read back by Lori Chiu RN, 12/10/2021  22:46, by [facility]  Performed at:  [address]   Phone [phone]       When ordered only abnormal lab results are displayed. All other labs were within normal range or not returned as of this dictation. EKG: When ordered, EKG's are interpreted by the Emergency Department Physician in the absence of a cardiologist.  Please see their note for interpretation of EKG.     RADIOLOGY:   Non-plain film images such as CT, Ultrasound and MRI are read by the radiologist. Plain radiographic images are visualized and preliminarily interpreted by the ED Provider with the below findings:        Interpretation per the Radiologist below, if available at the time of this note:    XR CHEST PORTABLE   Final Result   No acute cardiopulmonary disease. No results found. PROCEDURES   Unless otherwise noted below, none     Procedures    CRITICAL CARE TIME   N/A    CONSULTS:  None      EMERGENCY DEPARTMENT COURSE and DIFFERENTIAL DIAGNOSIS/MDM:   Vitals:    Vitals:    12/10/21 2007   BP: (!) 124/95   Pulse: 75   Resp: 18   Temp: 98.6 °F (37 °C)   TempSrc: Oral   SpO2: 98%   Weight: 170 lb (77.1 kg)   Height: 5' (1.524 m)       Patient was given the following medications:  Medications   benzocaine-menthol (CEPACOL SORE THROAT) lozenge 1 lozenge (has no administration in time range)           Patient presenting with sore throat on the left. Viral ulcer noted. Covid concerns. Exposure known. Rapid Covid positive. X-ray negative. Patient be diagnosed with Covid virus infection and viral sore throat. Recommended ibuprofen and Tylenol for basic fever and pain control. Chloraseptic or similar throat lozenge OTC recommended. Salt water gargle recommended. I did recommend vitamin C 1000 mg twice daily and vitamin D3 2000 units twice daily. I did send prescription for Decadron 6 mg p.o. x5 days to her local / [facility]. I also suggested the use of aspirin 81 mg daily for the next 1 to 2 weeks. Patient did express understanding the diagnosis and the treatment plan.     FINAL IMPRESSION      1. COVID-19 virus infection          DISPOSITION/PLAN   DISPOSITION Decision To Discharge 12/10/2021 10:56:54 PM      PATIENT REFERRED TO:  Your healthcare provider    Schedule an appointment as soon as possible for a visit in 1 week      [facility]  ED  [address] [facility]  Go to   If symptoms worsen      DISCHARGE MEDICATIONS:  New Prescriptions    DEXAMETHASONE (DECADRON) 6 MG TABLET    Take 1 tablet by mouth daily for 5 days       DISCONTINUED MEDICATIONS:  Discontinued Medications    No medications on file              (Please note that portions of this note were completed with a voice recognition program.  Efforts were made to edit the dictations but occasionally words are mis-transcribed. )    Laquita Kowalski PA-C (electronically signed)           Laquita Kowalski PA-C  12/10/21 [id number]

## 2021-12-13 ENCOUNTER — CARE COORDINATION (OUTPATIENT)
Dept: CARE COORDINATION | Age: 21
End: 2021-12-13

## 2021-12-13 NOTE — CARE COORDINATION
ACM attempted outreach. Left message. Contact information for call back provided.      ER visit on 12/10/21 covid ( covid risk score 3)

## 2021-12-14 ENCOUNTER — CARE COORDINATION (OUTPATIENT)
Dept: CARE COORDINATION | Age: 21
End: 2021-12-14

## 2021-12-14 NOTE — CARE COORDINATION
ACM attempted outreach. Left message. Contact information for call back provided. 2nd attempt and my chart message sent. Care transition completed.

## 2022-10-25 ENCOUNTER — HOSPITAL ENCOUNTER (OUTPATIENT)
Dept: ULTRASOUND IMAGING | Age: 22
Discharge: HOME | End: 2022-10-25
Payer: MEDICAID

## 2022-10-25 ENCOUNTER — HOSPITAL ENCOUNTER (OUTPATIENT)
Dept: WOMENS IMAGING | Age: 22
Discharge: HOME | End: 2022-10-25
Payer: MEDICAID

## 2022-10-25 DIAGNOSIS — N64.3 GALACTORRHEA: ICD-10-CM

## 2022-10-25 DIAGNOSIS — N93.9 ABNORMAL UTERINE BLEEDING: ICD-10-CM

## 2022-10-25 PROCEDURE — 76830 TRANSVAGINAL US NON-OB: CPT

## 2022-10-25 PROCEDURE — 76642 ULTRASOUND BREAST LIMITED: CPT

## 2023-06-22 RX ORDER — FLUOXETINE HYDROCHLORIDE 20 MG/1
20 CAPSULE ORAL DAILY
COMMUNITY

## 2023-06-22 RX ORDER — HYDROXYZINE HYDROCHLORIDE 25 MG/1
25 TABLET, FILM COATED ORAL 2 TIMES DAILY
COMMUNITY

## 2023-06-26 ENCOUNTER — ANESTHESIA EVENT (OUTPATIENT)
Dept: OPERATING ROOM | Age: 23
End: 2023-06-26
Payer: MEDICAID

## 2023-06-30 ENCOUNTER — HOSPITAL ENCOUNTER (OUTPATIENT)
Age: 23
Setting detail: OUTPATIENT SURGERY
Discharge: HOME | End: 2023-06-30
Attending: STUDENT IN AN ORGANIZED HEALTH CARE EDUCATION/TRAINING PROGRAM | Admitting: STUDENT IN AN ORGANIZED HEALTH CARE EDUCATION/TRAINING PROGRAM
Payer: MEDICAID

## 2023-06-30 ENCOUNTER — ANESTHESIA (OUTPATIENT)
Dept: OPERATING ROOM | Age: 23
End: 2023-06-30
Payer: MEDICAID

## 2023-06-30 VITALS
SYSTOLIC BLOOD PRESSURE: 90 MMHG | DIASTOLIC BLOOD PRESSURE: 57 MMHG | OXYGEN SATURATION: 96 % | TEMPERATURE: 97.5 F | RESPIRATION RATE: 11 BRPM | BODY MASS INDEX: 31.8 KG/M2 | HEIGHT: 60 IN | HEART RATE: 63 BPM | WEIGHT: 162 LBS

## 2023-06-30 DIAGNOSIS — R87.613 HGSIL ON CYTOLOGIC SMEAR OF CERVIX: ICD-10-CM

## 2023-06-30 DIAGNOSIS — G89.18 POSTOPERATIVE PAIN: Primary | ICD-10-CM

## 2023-06-30 PROBLEM — R00.0 TACHYCARDIA: Status: RESOLVED | Noted: 2020-02-16 | Resolved: 2023-06-30

## 2023-06-30 PROBLEM — O98.819 CHLAMYDIA INFECTION DURING PREGNANCY: Status: RESOLVED | Noted: 2020-03-09 | Resolved: 2023-06-30

## 2023-06-30 PROBLEM — F19.10 SUBSTANCE ABUSE AFFECTING PREGNANCY, ANTEPARTUM (HCC): Status: RESOLVED | Noted: 2020-03-09 | Resolved: 2023-06-30

## 2023-06-30 PROBLEM — Z98.891 S/P CESAREAN SECTION: Status: RESOLVED | Noted: 2020-03-10 | Resolved: 2023-06-30

## 2023-06-30 PROBLEM — J11.1 FLU: Status: RESOLVED | Noted: 2020-02-16 | Resolved: 2023-06-30

## 2023-06-30 PROBLEM — O99.013 ANEMIA DURING PREGNANCY IN THIRD TRIMESTER: Status: RESOLVED | Noted: 2020-03-09 | Resolved: 2023-06-30

## 2023-06-30 PROBLEM — A74.9 CHLAMYDIA INFECTION DURING PREGNANCY: Status: RESOLVED | Noted: 2020-03-09 | Resolved: 2023-06-30

## 2023-06-30 PROBLEM — O99.333 TOBACCO SMOKING AFFECTING PREGNANCY IN THIRD TRIMESTER: Status: RESOLVED | Noted: 2020-03-09 | Resolved: 2023-06-30

## 2023-06-30 PROBLEM — Z37.9 NORMAL LABOR: Status: RESOLVED | Noted: 2020-03-09 | Resolved: 2023-06-30

## 2023-06-30 PROBLEM — O99.320 SUBSTANCE ABUSE AFFECTING PREGNANCY, ANTEPARTUM: Status: RESOLVED | Noted: 2020-03-09 | Resolved: 2023-06-30

## 2023-06-30 PROBLEM — O09.30 LATE PRENATAL CARE: Status: RESOLVED | Noted: 2020-03-09 | Resolved: 2023-06-30

## 2023-06-30 PROBLEM — O99.820 GROUP B STREPTOCOCCUS CARRIER, +RV CULTURE, CURRENTLY PREGNANT: Status: RESOLVED | Noted: 2020-03-09 | Resolved: 2023-06-30

## 2023-06-30 LAB
DEPRECATED RDW RBC AUTO: 12.1 % (ref 12.4–15.4)
HCG UR QL: NEGATIVE
HCT VFR BLD AUTO: 44.5 % (ref 36–48)
HGB BLD-MCNC: 15.1 G/DL (ref 12–16)
MCH RBC QN AUTO: 29.9 PG (ref 26–34)
MCHC RBC AUTO-ENTMCNC: 33.9 G/DL (ref 31–36)
MCV RBC AUTO: 88.4 FL (ref 80–100)
PLATELET # BLD AUTO: 251 K/UL (ref 135–450)
PMV BLD AUTO: 7.1 FL (ref 5–10.5)
RBC # BLD AUTO: 5.04 M/UL (ref 4–5.2)
WBC # BLD AUTO: 7.2 K/UL (ref 4–11)

## 2023-06-30 PROCEDURE — 6360000002 HC RX W HCPCS: Performed by: NURSE ANESTHETIST, CERTIFIED REGISTERED

## 2023-06-30 PROCEDURE — 2500000003 HC RX 250 WO HCPCS: Performed by: ANESTHESIOLOGY

## 2023-06-30 PROCEDURE — 2709999900 HC NON-CHARGEABLE SUPPLY: Performed by: STUDENT IN AN ORGANIZED HEALTH CARE EDUCATION/TRAINING PROGRAM

## 2023-06-30 PROCEDURE — 85027 COMPLETE CBC AUTOMATED: CPT

## 2023-06-30 PROCEDURE — 3600000012 HC SURGERY LEVEL 2 ADDTL 15MIN: Performed by: STUDENT IN AN ORGANIZED HEALTH CARE EDUCATION/TRAINING PROGRAM

## 2023-06-30 PROCEDURE — 3700000001 HC ADD 15 MINUTES (ANESTHESIA): Performed by: STUDENT IN AN ORGANIZED HEALTH CARE EDUCATION/TRAINING PROGRAM

## 2023-06-30 PROCEDURE — 2580000003 HC RX 258: Performed by: ANESTHESIOLOGY

## 2023-06-30 PROCEDURE — 2500000003 HC RX 250 WO HCPCS: Performed by: STUDENT IN AN ORGANIZED HEALTH CARE EDUCATION/TRAINING PROGRAM

## 2023-06-30 PROCEDURE — 2500000003 HC RX 250 WO HCPCS: Performed by: NURSE ANESTHETIST, CERTIFIED REGISTERED

## 2023-06-30 PROCEDURE — 36415 COLL VENOUS BLD VENIPUNCTURE: CPT

## 2023-06-30 PROCEDURE — 7100000001 HC PACU RECOVERY - ADDTL 15 MIN: Performed by: STUDENT IN AN ORGANIZED HEALTH CARE EDUCATION/TRAINING PROGRAM

## 2023-06-30 PROCEDURE — 7100000000 HC PACU RECOVERY - FIRST 15 MIN: Performed by: STUDENT IN AN ORGANIZED HEALTH CARE EDUCATION/TRAINING PROGRAM

## 2023-06-30 PROCEDURE — 7100000011 HC PHASE II RECOVERY - ADDTL 15 MIN: Performed by: STUDENT IN AN ORGANIZED HEALTH CARE EDUCATION/TRAINING PROGRAM

## 2023-06-30 PROCEDURE — 7100000010 HC PHASE II RECOVERY - FIRST 15 MIN: Performed by: STUDENT IN AN ORGANIZED HEALTH CARE EDUCATION/TRAINING PROGRAM

## 2023-06-30 PROCEDURE — 6360000002 HC RX W HCPCS: Performed by: ANESTHESIOLOGY

## 2023-06-30 PROCEDURE — 88307 TISSUE EXAM BY PATHOLOGIST: CPT

## 2023-06-30 PROCEDURE — 3600000002 HC SURGERY LEVEL 2 BASE: Performed by: STUDENT IN AN ORGANIZED HEALTH CARE EDUCATION/TRAINING PROGRAM

## 2023-06-30 PROCEDURE — 84703 CHORIONIC GONADOTROPIN ASSAY: CPT

## 2023-06-30 PROCEDURE — 3700000000 HC ANESTHESIA ATTENDED CARE: Performed by: STUDENT IN AN ORGANIZED HEALTH CARE EDUCATION/TRAINING PROGRAM

## 2023-06-30 RX ORDER — DEXAMETHASONE SODIUM PHOSPHATE 4 MG/ML
INJECTION, SOLUTION INTRA-ARTICULAR; INTRALESIONAL; INTRAMUSCULAR; INTRAVENOUS; SOFT TISSUE PRN
Status: DISCONTINUED | OUTPATIENT
Start: 2023-06-30 | End: 2023-06-30 | Stop reason: SDUPTHER

## 2023-06-30 RX ORDER — FENTANYL CITRATE 50 UG/ML
INJECTION, SOLUTION INTRAMUSCULAR; INTRAVENOUS PRN
Status: DISCONTINUED | OUTPATIENT
Start: 2023-06-30 | End: 2023-06-30 | Stop reason: SDUPTHER

## 2023-06-30 RX ORDER — ACETAMINOPHEN 500 MG
500 TABLET ORAL EVERY 6 HOURS PRN
Qty: 120 TABLET | Refills: 3 | Status: SHIPPED | OUTPATIENT
Start: 2023-06-30

## 2023-06-30 RX ORDER — LABETALOL HYDROCHLORIDE 5 MG/ML
10 INJECTION, SOLUTION INTRAVENOUS
Status: DISCONTINUED | OUTPATIENT
Start: 2023-06-30 | End: 2023-06-30 | Stop reason: HOSPADM

## 2023-06-30 RX ORDER — SODIUM CHLORIDE 0.9 % (FLUSH) 0.9 %
5-40 SYRINGE (ML) INJECTION PRN
Status: DISCONTINUED | OUTPATIENT
Start: 2023-06-30 | End: 2023-06-30 | Stop reason: SDUPTHER

## 2023-06-30 RX ORDER — SODIUM CHLORIDE 9 MG/ML
INJECTION, SOLUTION INTRAVENOUS PRN
Status: DISCONTINUED | OUTPATIENT
Start: 2023-06-30 | End: 2023-06-30 | Stop reason: SDUPTHER

## 2023-06-30 RX ORDER — ONDANSETRON 2 MG/ML
INJECTION INTRAMUSCULAR; INTRAVENOUS PRN
Status: DISCONTINUED | OUTPATIENT
Start: 2023-06-30 | End: 2023-06-30 | Stop reason: SDUPTHER

## 2023-06-30 RX ORDER — FAMOTIDINE 10 MG/ML
20 INJECTION, SOLUTION INTRAVENOUS ONCE
Status: COMPLETED | OUTPATIENT
Start: 2023-06-30 | End: 2023-06-30

## 2023-06-30 RX ORDER — OXYCODONE HYDROCHLORIDE 5 MG/1
5 TABLET ORAL PRN
Status: DISCONTINUED | OUTPATIENT
Start: 2023-06-30 | End: 2023-06-30 | Stop reason: HOSPADM

## 2023-06-30 RX ORDER — ONDANSETRON 2 MG/ML
4 INJECTION INTRAMUSCULAR; INTRAVENOUS
Status: DISCONTINUED | OUTPATIENT
Start: 2023-06-30 | End: 2023-06-30 | Stop reason: HOSPADM

## 2023-06-30 RX ORDER — SODIUM CHLORIDE 0.9 % (FLUSH) 0.9 %
5-40 SYRINGE (ML) INJECTION PRN
Status: DISCONTINUED | OUTPATIENT
Start: 2023-06-30 | End: 2023-06-30 | Stop reason: HOSPADM

## 2023-06-30 RX ORDER — LIDOCAINE HYDROCHLORIDE AND EPINEPHRINE 10; 10 MG/ML; UG/ML
INJECTION, SOLUTION INFILTRATION; PERINEURAL PRN
Status: DISCONTINUED | OUTPATIENT
Start: 2023-06-30 | End: 2023-06-30 | Stop reason: ALTCHOICE

## 2023-06-30 RX ORDER — MEPERIDINE HYDROCHLORIDE 25 MG/ML
12.5 INJECTION INTRAMUSCULAR; INTRAVENOUS; SUBCUTANEOUS EVERY 5 MIN PRN
Status: DISCONTINUED | OUTPATIENT
Start: 2023-06-30 | End: 2023-06-30 | Stop reason: HOSPADM

## 2023-06-30 RX ORDER — IBUPROFEN 600 MG/1
600 TABLET ORAL EVERY 6 HOURS PRN
Qty: 120 TABLET | Refills: 3 | Status: SHIPPED | OUTPATIENT
Start: 2023-06-30

## 2023-06-30 RX ORDER — KETOROLAC TROMETHAMINE 30 MG/ML
INJECTION, SOLUTION INTRAMUSCULAR; INTRAVENOUS PRN
Status: DISCONTINUED | OUTPATIENT
Start: 2023-06-30 | End: 2023-06-30 | Stop reason: SDUPTHER

## 2023-06-30 RX ORDER — OXYCODONE HYDROCHLORIDE 5 MG/1
10 TABLET ORAL PRN
Status: DISCONTINUED | OUTPATIENT
Start: 2023-06-30 | End: 2023-06-30 | Stop reason: HOSPADM

## 2023-06-30 RX ORDER — DIPHENHYDRAMINE HYDROCHLORIDE 50 MG/ML
12.5 INJECTION INTRAMUSCULAR; INTRAVENOUS
Status: DISCONTINUED | OUTPATIENT
Start: 2023-06-30 | End: 2023-06-30 | Stop reason: HOSPADM

## 2023-06-30 RX ORDER — OXYCODONE HYDROCHLORIDE 5 MG/1
5 TABLET ORAL EVERY 6 HOURS PRN
Qty: 3 TABLET | Refills: 0 | Status: SHIPPED | OUTPATIENT
Start: 2023-06-30 | End: 2023-07-07

## 2023-06-30 RX ORDER — SODIUM CHLORIDE 0.9 % (FLUSH) 0.9 %
5-40 SYRINGE (ML) INJECTION EVERY 12 HOURS SCHEDULED
Status: DISCONTINUED | OUTPATIENT
Start: 2023-06-30 | End: 2023-06-30 | Stop reason: HOSPADM

## 2023-06-30 RX ORDER — SODIUM CHLORIDE 9 MG/ML
INJECTION, SOLUTION INTRAVENOUS PRN
Status: DISCONTINUED | OUTPATIENT
Start: 2023-06-30 | End: 2023-06-30 | Stop reason: HOSPADM

## 2023-06-30 RX ORDER — PROPOFOL 10 MG/ML
INJECTION, EMULSION INTRAVENOUS PRN
Status: DISCONTINUED | OUTPATIENT
Start: 2023-06-30 | End: 2023-06-30 | Stop reason: SDUPTHER

## 2023-06-30 RX ORDER — SODIUM CHLORIDE, SODIUM LACTATE, POTASSIUM CHLORIDE, CALCIUM CHLORIDE 600; 310; 30; 20 MG/100ML; MG/100ML; MG/100ML; MG/100ML
INJECTION, SOLUTION INTRAVENOUS CONTINUOUS
Status: DISCONTINUED | OUTPATIENT
Start: 2023-06-30 | End: 2023-06-30 | Stop reason: HOSPADM

## 2023-06-30 RX ORDER — LIDOCAINE HYDROCHLORIDE 20 MG/ML
INJECTION, SOLUTION INFILTRATION; PERINEURAL PRN
Status: DISCONTINUED | OUTPATIENT
Start: 2023-06-30 | End: 2023-06-30 | Stop reason: SDUPTHER

## 2023-06-30 RX ORDER — SODIUM CHLORIDE 0.9 % (FLUSH) 0.9 %
5-40 SYRINGE (ML) INJECTION EVERY 12 HOURS SCHEDULED
Status: DISCONTINUED | OUTPATIENT
Start: 2023-06-30 | End: 2023-06-30 | Stop reason: SDUPTHER

## 2023-06-30 RX ADMIN — FENTANYL CITRATE 50 MCG: 50 INJECTION INTRAMUSCULAR; INTRAVENOUS at 09:38

## 2023-06-30 RX ADMIN — FENTANYL CITRATE 50 MCG: 50 INJECTION INTRAMUSCULAR; INTRAVENOUS at 09:27

## 2023-06-30 RX ADMIN — DEXAMETHASONE SODIUM PHOSPHATE 8 MG: 4 INJECTION, SOLUTION INTRAMUSCULAR; INTRAVENOUS at 09:36

## 2023-06-30 RX ADMIN — KETOROLAC TROMETHAMINE 30 MG: 30 INJECTION, SOLUTION INTRAMUSCULAR at 09:52

## 2023-06-30 RX ADMIN — PROPOFOL 50 MG: 10 INJECTION, EMULSION INTRAVENOUS at 09:38

## 2023-06-30 RX ADMIN — SODIUM CHLORIDE, POTASSIUM CHLORIDE, SODIUM LACTATE AND CALCIUM CHLORIDE: 600; 310; 30; 20 INJECTION, SOLUTION INTRAVENOUS at 08:53

## 2023-06-30 RX ADMIN — MEPERIDINE HYDROCHLORIDE 12.5 MG: 25 INJECTION, SOLUTION INTRAMUSCULAR; INTRAVENOUS; SUBCUTANEOUS at 10:27

## 2023-06-30 RX ADMIN — PROPOFOL 200 MG: 10 INJECTION, EMULSION INTRAVENOUS at 09:26

## 2023-06-30 RX ADMIN — ONDANSETRON HYDROCHLORIDE 4 MG: 2 INJECTION, SOLUTION INTRAMUSCULAR; INTRAVENOUS at 09:45

## 2023-06-30 RX ADMIN — FAMOTIDINE 20 MG: 10 INJECTION INTRAVENOUS at 08:53

## 2023-06-30 RX ADMIN — LIDOCAINE HYDROCHLORIDE 60 MG: 20 INJECTION, SOLUTION INFILTRATION; PERINEURAL at 09:26

## 2023-06-30 ASSESSMENT — PAIN - FUNCTIONAL ASSESSMENT: PAIN_FUNCTIONAL_ASSESSMENT: 0-10

## 2023-06-30 ASSESSMENT — LIFESTYLE VARIABLES: SMOKING_STATUS: 1

## 2023-10-11 ENCOUNTER — HOSPITAL ENCOUNTER (EMERGENCY)
Age: 23
Discharge: HOME | End: 2023-10-11

## 2023-10-11 VITALS
OXYGEN SATURATION: 99 % | DIASTOLIC BLOOD PRESSURE: 62 MMHG | SYSTOLIC BLOOD PRESSURE: 91 MMHG | HEART RATE: 78 BPM | RESPIRATION RATE: 20 BRPM | TEMPERATURE: 98.2 F | HEIGHT: 60 IN | WEIGHT: 170 LBS | BODY MASS INDEX: 33.38 KG/M2

## 2023-10-11 DIAGNOSIS — O21.0 MILD HYPEREMESIS GRAVIDARUM, ANTEPARTUM: Primary | ICD-10-CM

## 2023-10-11 LAB
ALBUMIN SERPL-MCNC: 4.7 G/DL (ref 3.4–5)
ALBUMIN/GLOB SERPL: 1.6 {RATIO} (ref 1.1–2.2)
ALP SERPL-CCNC: 62 U/L (ref 40–129)
ALT SERPL-CCNC: 23 U/L (ref 10–40)
ANION GAP SERPL CALCULATED.3IONS-SCNC: 13 MMOL/L (ref 3–16)
AST SERPL-CCNC: 18 U/L (ref 15–37)
B-HCG SERPL EIA 3RD IS-ACNC: NORMAL MIU/ML
BACTERIA URNS QL MICRO: ABNORMAL /HPF
BASOPHILS # BLD: 0 K/UL (ref 0–0.2)
BASOPHILS NFR BLD: 0.2 %
BILIRUB SERPL-MCNC: 1 MG/DL (ref 0–1)
BILIRUB UR QL STRIP.AUTO: NEGATIVE
BUN SERPL-MCNC: 6 MG/DL (ref 7–20)
CALCIUM SERPL-MCNC: 9.5 MG/DL (ref 8.3–10.6)
CHLORIDE SERPL-SCNC: 101 MMOL/L (ref 99–110)
CLARITY UR: CLEAR
CO2 SERPL-SCNC: 22 MMOL/L (ref 21–32)
COLOR UR: YELLOW
CREAT SERPL-MCNC: <0.5 MG/DL (ref 0.6–1.1)
DEPRECATED RDW RBC AUTO: 12.5 % (ref 12.4–15.4)
EOSINOPHIL # BLD: 0.1 K/UL (ref 0–0.6)
EOSINOPHIL NFR BLD: 0.9 %
EPI CELLS #/AREA URNS HPF: ABNORMAL /HPF (ref 0–5)
GFR SERPLBLD CREATININE-BSD FMLA CKD-EPI: >60 ML/MIN/{1.73_M2}
GLUCOSE SERPL-MCNC: 96 MG/DL (ref 70–99)
GLUCOSE UR STRIP.AUTO-MCNC: NEGATIVE MG/DL
HCT VFR BLD AUTO: 44.4 % (ref 36–48)
HGB BLD-MCNC: 15.1 G/DL (ref 12–16)
HGB UR QL STRIP.AUTO: NEGATIVE
KETONES UR STRIP.AUTO-MCNC: 15 MG/DL
LEUKOCYTE ESTERASE UR QL STRIP.AUTO: ABNORMAL
LIPASE SERPL-CCNC: 17 U/L (ref 13–60)
LYMPHOCYTES # BLD: 1.4 K/UL (ref 1–5.1)
LYMPHOCYTES NFR BLD: 11.5 %
MCH RBC QN AUTO: 30.7 PG (ref 26–34)
MCHC RBC AUTO-ENTMCNC: 33.9 G/DL (ref 31–36)
MCV RBC AUTO: 90.4 FL (ref 80–100)
MONOCYTES # BLD: 0.6 K/UL (ref 0–1.3)
MONOCYTES NFR BLD: 4.5 %
NEUTROPHILS # BLD: 10.4 K/UL (ref 1.7–7.7)
NEUTROPHILS NFR BLD: 82.9 %
NITRITE UR QL STRIP.AUTO: NEGATIVE
PH UR STRIP.AUTO: 6.5 [PH] (ref 5–8)
PLATELET # BLD AUTO: 282 K/UL (ref 135–450)
PMV BLD AUTO: 7 FL (ref 5–10.5)
POTASSIUM SERPL-SCNC: 4 MMOL/L (ref 3.5–5.1)
PROT SERPL-MCNC: 7.6 G/DL (ref 6.4–8.2)
PROT UR STRIP.AUTO-MCNC: NEGATIVE MG/DL
RBC # BLD AUTO: 4.91 M/UL (ref 4–5.2)
RBC #/AREA URNS HPF: ABNORMAL /HPF (ref 0–4)
SODIUM SERPL-SCNC: 136 MMOL/L (ref 136–145)
SP GR UR STRIP.AUTO: 1.01 (ref 1–1.03)
UA COMPLETE W REFLEX CULTURE PNL UR: ABNORMAL
UA DIPSTICK W REFLEX MICRO PNL UR: YES
URN SPEC COLLECT METH UR: ABNORMAL
UROBILINOGEN UR STRIP-ACNC: 0.2 E.U./DL
WBC # BLD AUTO: 12.5 K/UL (ref 4–11)
WBC #/AREA URNS HPF: ABNORMAL /HPF (ref 0–5)

## 2023-10-11 PROCEDURE — 6360000002 HC RX W HCPCS: Performed by: PHYSICIAN ASSISTANT

## 2023-10-11 PROCEDURE — 80053 COMPREHEN METABOLIC PANEL: CPT

## 2023-10-11 PROCEDURE — 83690 ASSAY OF LIPASE: CPT

## 2023-10-11 PROCEDURE — 96375 TX/PRO/DX INJ NEW DRUG ADDON: CPT

## 2023-10-11 PROCEDURE — 96374 THER/PROPH/DIAG INJ IV PUSH: CPT

## 2023-10-11 PROCEDURE — 2580000003 HC RX 258: Performed by: PHYSICIAN ASSISTANT

## 2023-10-11 PROCEDURE — 81001 URINALYSIS AUTO W/SCOPE: CPT

## 2023-10-11 PROCEDURE — 96361 HYDRATE IV INFUSION ADD-ON: CPT

## 2023-10-11 PROCEDURE — 2500000003 HC RX 250 WO HCPCS: Performed by: PHYSICIAN ASSISTANT

## 2023-10-11 PROCEDURE — 99284 EMERGENCY DEPT VISIT MOD MDM: CPT

## 2023-10-11 PROCEDURE — 85025 COMPLETE CBC W/AUTO DIFF WBC: CPT

## 2023-10-11 PROCEDURE — 84702 CHORIONIC GONADOTROPIN TEST: CPT

## 2023-10-11 RX ORDER — DIPHENHYDRAMINE HYDROCHLORIDE 50 MG/ML
25 INJECTION INTRAMUSCULAR; INTRAVENOUS ONCE
Status: COMPLETED | OUTPATIENT
Start: 2023-10-11 | End: 2023-10-11

## 2023-10-11 RX ORDER — METOCLOPRAMIDE 10 MG/1
10 TABLET ORAL 3 TIMES DAILY PRN
Qty: 30 TABLET | Refills: 0 | Status: SHIPPED | OUTPATIENT
Start: 2023-10-11

## 2023-10-11 RX ORDER — 0.9 % SODIUM CHLORIDE 0.9 %
2000 INTRAVENOUS SOLUTION INTRAVENOUS ONCE
Status: COMPLETED | OUTPATIENT
Start: 2023-10-11 | End: 2023-10-11

## 2023-10-11 RX ORDER — FAMOTIDINE 10 MG/ML
20 INJECTION, SOLUTION INTRAVENOUS ONCE
Status: COMPLETED | OUTPATIENT
Start: 2023-10-11 | End: 2023-10-11

## 2023-10-11 RX ORDER — METOCLOPRAMIDE HYDROCHLORIDE 5 MG/ML
10 INJECTION INTRAMUSCULAR; INTRAVENOUS ONCE
Status: COMPLETED | OUTPATIENT
Start: 2023-10-11 | End: 2023-10-11

## 2023-10-11 RX ADMIN — METOCLOPRAMIDE HYDROCHLORIDE 10 MG: 5 INJECTION INTRAMUSCULAR; INTRAVENOUS at 11:03

## 2023-10-11 RX ADMIN — PYRIDOXINE HYDROCHLORIDE 50 MG: 100 INJECTION, SOLUTION INTRAMUSCULAR; INTRAVENOUS at 12:49

## 2023-10-11 RX ADMIN — FAMOTIDINE 20 MG: 10 INJECTION, SOLUTION INTRAVENOUS at 11:03

## 2023-10-11 RX ADMIN — SODIUM CHLORIDE 2000 ML: 9 INJECTION, SOLUTION INTRAVENOUS at 10:59

## 2023-10-11 RX ADMIN — DIPHENHYDRAMINE HYDROCHLORIDE 25 MG: 50 INJECTION INTRAMUSCULAR; INTRAVENOUS at 11:03

## 2023-10-11 ASSESSMENT — PAIN DESCRIPTION - PAIN TYPE: TYPE: ACUTE PAIN

## 2023-10-11 ASSESSMENT — PAIN DESCRIPTION - ONSET: ONSET: PROGRESSIVE

## 2023-10-11 ASSESSMENT — PAIN DESCRIPTION - DESCRIPTORS: DESCRIPTORS: DISCOMFORT;SHARP

## 2023-10-11 ASSESSMENT — PAIN - FUNCTIONAL ASSESSMENT: PAIN_FUNCTIONAL_ASSESSMENT: 0-10

## 2023-10-11 ASSESSMENT — PAIN SCALES - GENERAL: PAINLEVEL_OUTOF10: 7

## 2023-10-11 ASSESSMENT — PAIN DESCRIPTION - LOCATION: LOCATION: HEAD

## 2023-10-11 ASSESSMENT — PAIN DESCRIPTION - FREQUENCY: FREQUENCY: INTERMITTENT

## 2023-10-11 NOTE — ED PROVIDER NOTES
[address] [place] [address]  ED  EMERGENCY DEPARTMENT ENCOUNTER        Pt Name: Justice Cruz  MRN: [id number]  9352 [facility] 2000  Date of evaluation: 10/11/2023  Provider: Miguel Mclaughlin PA-C  PCP: DEEP Zazueta NP  Note Started: 11:03 AM EDT 10/11/23      SAMMY. I have evaluated this patient. CHIEF COMPLAINT       Chief Complaint   Patient presents with    Morning Sickness     Patient reports + pregnancy 1.5 month JERZY , n/v x24 hours unable to keep water meds and food down. HISTORY OF PRESENT ILLNESS: 1 or more Elements     History From: patient  Limitations to history : None    Justice Cruz is a 20 y.o. female who presents to the emergency department with a chief complaint of nausea and vomiting. She has been having issues with nausea in this pregnancy worsening over the past 4 hours and has been unable to hold down any foods or liquids. Denies vaginal bleeding, vaginal discharge, dysuria pain, shortness of breath, fevers or any pain at all. She is G4, P1 with history of 2 miscarriages. Last normal menstrual cycle was sometime in the middle of August.    Nursing Notes were all reviewed and agreed with or any disagreements were addressed in the HPI. REVIEW OF SYSTEMS :      Review of Systems    Positives and Pertinent negatives as per HPI.      SURGICAL HISTORY     Past Surgical History:   Procedure Laterality Date     SECTION N/A 3/10/2020     SECTION performed by Lebron Awad MD at [facility]    LEEP N/A 2023    LOOP ELECTROSURGICAL EXCISIONAL PROCEDURE performed by Jason Tolentino DO at [address] [place] [address]  2019    left side of throat     WISDOM TOOTH EXTRACTION         CURRENTMEDICATIONS       Previous Medications    ACETAMINOPHEN (TYLENOL) 500 MG TABLET    Take 1 tablet by mouth every 6 hours as needed for Pain    FLUOXETINE (PROZAC) 20 MG CAPSULE    Take 1 capsule by mouth daily    HYDROXYZINE HCL (ATARAX) 25 MG

## 2023-10-19 LAB
ABO, EXTERNAL RESULT: NORMAL
HEP B, EXTERNAL RESULT: NEGATIVE
HIV, EXTERNAL RESULT: NEGATIVE
RH FACTOR, EXTERNAL RESULT: POSITIVE
RPR, EXTERNAL RESULT: NONREACTIVE
RUBELLA TITER, EXTERNAL RESULT: NORMAL

## 2023-10-27 ENCOUNTER — APPOINTMENT (OUTPATIENT)
Dept: ULTRASOUND IMAGING | Age: 23
End: 2023-10-27

## 2023-10-27 ENCOUNTER — HOSPITAL ENCOUNTER (EMERGENCY)
Age: 23
Discharge: HOME | End: 2023-10-27
Attending: STUDENT IN AN ORGANIZED HEALTH CARE EDUCATION/TRAINING PROGRAM

## 2023-10-27 VITALS
SYSTOLIC BLOOD PRESSURE: 110 MMHG | HEART RATE: 71 BPM | BODY MASS INDEX: 33.38 KG/M2 | DIASTOLIC BLOOD PRESSURE: 75 MMHG | HEIGHT: 60 IN | WEIGHT: 170 LBS | RESPIRATION RATE: 20 BRPM | TEMPERATURE: 98.4 F | OXYGEN SATURATION: 100 %

## 2023-10-27 DIAGNOSIS — O46.90 VAGINAL BLEEDING IN PREGNANCY: Primary | ICD-10-CM

## 2023-10-27 LAB
ABO + RH BLD: NORMAL
ALBUMIN SERPL-MCNC: 4.4 G/DL (ref 3.4–5)
ALBUMIN/GLOB SERPL: 1.5 {RATIO} (ref 1.1–2.2)
ALP SERPL-CCNC: 51 U/L (ref 40–129)
ALT SERPL-CCNC: 16 U/L (ref 10–40)
ANION GAP SERPL CALCULATED.3IONS-SCNC: 14 MMOL/L (ref 3–16)
AST SERPL-CCNC: 12 U/L (ref 15–37)
B-HCG SERPL EIA 3RD IS-ACNC: NORMAL MIU/ML
BACTERIA GENITAL QL WET PREP: NORMAL
BASOPHILS # BLD: 0 K/UL (ref 0–0.2)
BASOPHILS NFR BLD: 0.3 %
BILIRUB SERPL-MCNC: 0.4 MG/DL (ref 0–1)
BILIRUB UR QL STRIP.AUTO: NEGATIVE
BUN SERPL-MCNC: 5 MG/DL (ref 7–20)
CALCIUM SERPL-MCNC: 9.4 MG/DL (ref 8.3–10.6)
CHLORIDE SERPL-SCNC: 101 MMOL/L (ref 99–110)
CLARITY UR: CLEAR
CLUE CELLS SPEC QL WET PREP: NORMAL
CO2 SERPL-SCNC: 19 MMOL/L (ref 21–32)
COLOR UR: YELLOW
CREAT SERPL-MCNC: <0.5 MG/DL (ref 0.6–1.1)
DEPRECATED RDW RBC AUTO: 12.4 % (ref 12.4–15.4)
EOSINOPHIL # BLD: 0.1 K/UL (ref 0–0.6)
EOSINOPHIL NFR BLD: 1 %
EPI CELLS #/AREA URNS HPF: ABNORMAL /HPF (ref 0–5)
EPI CELLS SPEC QL WET PREP: NORMAL
GFR SERPLBLD CREATININE-BSD FMLA CKD-EPI: >60 ML/MIN/{1.73_M2}
GLUCOSE SERPL-MCNC: 118 MG/DL (ref 70–99)
GLUCOSE UR STRIP.AUTO-MCNC: NEGATIVE MG/DL
HCT VFR BLD AUTO: 39.9 % (ref 36–48)
HGB BLD-MCNC: 14.1 G/DL (ref 12–16)
HGB UR QL STRIP.AUTO: ABNORMAL
KETONES UR STRIP.AUTO-MCNC: NEGATIVE MG/DL
LEUKOCYTE ESTERASE UR QL STRIP.AUTO: NEGATIVE
LYMPHOCYTES # BLD: 1.9 K/UL (ref 1–5.1)
LYMPHOCYTES NFR BLD: 17.2 %
MAGNESIUM SERPL-MCNC: 1.9 MG/DL (ref 1.8–2.4)
MCH RBC QN AUTO: 31.5 PG (ref 26–34)
MCHC RBC AUTO-ENTMCNC: 35.4 G/DL (ref 31–36)
MCV RBC AUTO: 88.9 FL (ref 80–100)
MONOCYTES # BLD: 0.4 K/UL (ref 0–1.3)
MONOCYTES NFR BLD: 3.7 %
NEUTROPHILS # BLD: 8.7 K/UL (ref 1.7–7.7)
NEUTROPHILS NFR BLD: 77.8 %
NITRITE UR QL STRIP.AUTO: NEGATIVE
PH UR STRIP.AUTO: 7 [PH] (ref 5–8)
PLATELET # BLD AUTO: 310 K/UL (ref 135–450)
PMV BLD AUTO: 7.2 FL (ref 5–10.5)
POTASSIUM SERPL-SCNC: 3.5 MMOL/L (ref 3.5–5.1)
PROT SERPL-MCNC: 7.3 G/DL (ref 6.4–8.2)
PROT UR STRIP.AUTO-MCNC: NEGATIVE MG/DL
RBC # BLD AUTO: 4.48 M/UL (ref 4–5.2)
RBC #/AREA URNS HPF: >100 /HPF (ref 0–4)
RBC SPEC QL WET PREP: NORMAL
SODIUM SERPL-SCNC: 134 MMOL/L (ref 136–145)
SP GR UR STRIP.AUTO: 1.02 (ref 1–1.03)
SPECIMEN SOURCE FLD: NORMAL
T VAGINALIS GENITAL QL WET PREP: NORMAL
UA COMPLETE W REFLEX CULTURE PNL UR: ABNORMAL
UA DIPSTICK W REFLEX MICRO PNL UR: YES
URN SPEC COLLECT METH UR: ABNORMAL
UROBILINOGEN UR STRIP-ACNC: 0.2 E.U./DL
WBC # BLD AUTO: 11.2 K/UL (ref 4–11)
WBC #/AREA URNS HPF: ABNORMAL /HPF (ref 0–5)
WBC SPEC QL WET PREP: NORMAL
YEAST GENITAL QL WET PREP: NORMAL

## 2023-10-27 PROCEDURE — 87591 N.GONORRHOEAE DNA AMP PROB: CPT

## 2023-10-27 PROCEDURE — 87210 SMEAR WET MOUNT SALINE/INK: CPT

## 2023-10-27 PROCEDURE — 81001 URINALYSIS AUTO W/SCOPE: CPT

## 2023-10-27 PROCEDURE — 86901 BLOOD TYPING SEROLOGIC RH(D): CPT

## 2023-10-27 PROCEDURE — 85025 COMPLETE CBC W/AUTO DIFF WBC: CPT

## 2023-10-27 PROCEDURE — 76817 TRANSVAGINAL US OBSTETRIC: CPT

## 2023-10-27 PROCEDURE — 86900 BLOOD TYPING SEROLOGIC ABO: CPT

## 2023-10-27 PROCEDURE — 80053 COMPREHEN METABOLIC PANEL: CPT

## 2023-10-27 PROCEDURE — 76801 OB US < 14 WKS SINGLE FETUS: CPT

## 2023-10-27 PROCEDURE — 99284 EMERGENCY DEPT VISIT MOD MDM: CPT

## 2023-10-27 PROCEDURE — 83735 ASSAY OF MAGNESIUM: CPT

## 2023-10-27 PROCEDURE — 87491 CHLMYD TRACH DNA AMP PROBE: CPT

## 2023-10-27 PROCEDURE — 84702 CHORIONIC GONADOTROPIN TEST: CPT

## 2023-10-27 ASSESSMENT — ENCOUNTER SYMPTOMS
SHORTNESS OF BREATH: 0
NAUSEA: 0
DIARRHEA: 0
BACK PAIN: 0
CONSTIPATION: 0
ABDOMINAL PAIN: 1
RHINORRHEA: 0
COUGH: 0
VOMITING: 0
SORE THROAT: 0

## 2023-10-27 ASSESSMENT — PAIN - FUNCTIONAL ASSESSMENT
PAIN_FUNCTIONAL_ASSESSMENT: NONE - DENIES PAIN
PAIN_FUNCTIONAL_ASSESSMENT: NONE - DENIES PAIN

## 2023-10-27 NOTE — ED NOTES
@8721 called [facility] for consult per DEEP March CNP  RE: vaginal bleeding in pregnancy  @2398 Dr. Brittaney Graves called back and spoke with DEEP March CNP, Sherri  10/27/23 4823

## 2023-10-27 NOTE — ED PROVIDER NOTES
[address] [place] [address]  ED  EMERGENCY DEPARTMENT ENCOUNTER        Pt Name: Justice [facility] Nancy  MRN: [id number]  [address] Nune[address] 2000  Date of evaluation: 10/27/2023  Provider: DEEP Collier CNP  PCP: DEEP Adams NP    This patient was seen and evaluated by the attending physician Terri Layton MD.    I have evaluated this patient. My supervising physician was available for consultation. CHIEF COMPLAINT       Chief Complaint   Patient presents with    Vaginal Bleeding     Patient to the ED via [facility] for vaginal bleeding. Patient states she is 10 weeks pregnant and starting bleeding today after she woke up about 0700. States she went through 2 pads since waking up. Patient reports bright red blood, patient reports some lower abdominal cramping. HISTORY OF PRESENT ILLNESS   (Location/Symptom, Timing/Onset, Context/Setting, Quality, Duration, Modifying Factors, Severity)  Note limiting factors. History from : Patient  Justice Cruz is a 20 y.o. female who presents via ems for  vaginal bleeding in pregnancy. Onset was this morning. Duration has been since the onset. Context includes patient presents to the emergency department today via EMS with reports of vaginal bleeding. She does state that she is approximately 10 weeks pregnant G3, P1 with 1 miscarriage. She is followed by OB and recently had an ultrasound on Monday where they saw cardiac activity and confirmed IUP. Patient states that she is currently being treated for a yeast infection and was using over-the-counter Monistat 7 for the last 3 days. This morning she states that she felt what she thought was vaginal discharge and thought it was the \"medicine coming out\" but states when she went to the bathroom she noticed bright red blood. She also states that she had some lower abdominal cramping associated with this bleeding. She denies chest pain, palpitations. She denies dizziness lightheadedness.

## 2023-10-27 NOTE — ED NOTES
Discharge instructions reviewed without questions. No further needs voiced at this time. Ambulatory from department in stable condition.          Cherelle Basilio RN  10/27/23 0286

## 2023-10-28 LAB
C TRACH DNA CVX QL NAA+PROBE: NEGATIVE
N GONORRHOEA DNA CERV MUCUS QL NAA+PROBE: NEGATIVE

## 2023-11-13 LAB
C. TRACHOMATIS, EXTERNAL RESULT: NEGATIVE
N. GONORRHOEAE, EXTERNAL RESULT: NEGATIVE

## 2024-03-26 ENCOUNTER — HOSPITAL ENCOUNTER (OUTPATIENT)
Dept: PHYSICAL THERAPY | Age: 24
Setting detail: THERAPIES SERIES
Discharge: HOME | End: 2024-03-26
Payer: MEDICAID

## 2024-03-26 PROCEDURE — 97140 MANUAL THERAPY 1/> REGIONS: CPT

## 2024-03-26 PROCEDURE — 97110 THERAPEUTIC EXERCISES: CPT

## 2024-03-26 PROCEDURE — 97530 THERAPEUTIC ACTIVITIES: CPT

## 2024-03-26 PROCEDURE — 97161 PT EVAL LOW COMPLEX 20 MIN: CPT

## 2024-03-26 NOTE — THERAPY EVALUATION
[facility] - Outpatient Rehabilitation and Therapy, Critical access hospital  [address] [place] [address], [address]  [place]  [address]  Phone: [phone]  Fax [phone]                                                        Physical Therapy Certification    Dear Marie Barrett DO,    We had the pleasure of evaluating the following patient for physical therapy services at [facility].  A summary of our findings can be found in the initial assessment below.  This includes our plan of care.  If you have any questions or concerns regarding these findings, please do not hesitate to contact me at the office phone number checked above.  Thank you for the referral.       Physician Signature:_______________________________Date:__________________  By signing above (or electronic signature), therapist's plan is approved by physician      Patient: Ana Laura HEMPHILL Yung   : 2000   MRN: [id number]  Referring Physician: Marie Barrett DO      Evaluation Date: 3/26/2024      Medical Diagnosis Information:  Diagnosis: O99.891, M79.18 (ICD-10-CM) - Pain in symphysis pubis during pregnancy      Treatment Diagnosis:   pelvic pain and LE weakness                                       Insurance information: PT Insurance Information: [facility]    Chaperone offered for pelvic examination?  [] No    [x] Yes  Chaperone requested for examination:  [x] No    [] Yes   If yes, who was present:    Precautions/ Contra-indications: 31 weeks pregnant    Latex Allergy:  [x]NO      []YES  Allergies: Patient has no known allergies.     Preferred Language for Healthcare:   [x]English       []Other:    C-SSRS Triggered by Intake questionnaire (Past 2 wk assessment ):   [x] No, Questionnaire did not trigger screening.   [] Yes, Patient intake triggered C-SSRS Screening     [] Completed, no further action required.   [] Completed, PCP notified via Epic    Functional Outcome:   PFDI-20: 69/300 (Lower score

## 2024-03-26 NOTE — PROGRESS NOTES
[facility] - Outpatient Rehabilitation and Therapy, [address], [address]  [place]  [address]  Phone: [phone]  Fax [phone]      Physical Therapy Daily Treatment Note    Date:  3/26/2024    Patient Name:  Ana Laura Barragan    :  2000  MRN: [id number]    Restrictions/Precautions:  universal   Allergies: Patient has no known allergies.   Preferred Language for Healthcare:   [x] English       [] other:    Medical Diagnosis Information:  Diagnosis: O99.891, M79.18 (ICD-10-CM) - Pain in symphysis pubis during pregnancy  Treatment Diagnosis:pelvic pain and LE weakness         Insurance/Certification information:  PT Insurance Information: [employer]  Physician Information:  Marie Barrett DO  Plan of care signed (Y/N):  N    Visit# / total visits:         Functional Outcome (if applicable):         PFDI-20: 69/300 (Lower score = Better function)          Time in:   8:00am      Timed Treatment: 45min Total Treatment Time:  60min  ________________________________________________________________________________________    Pain Level:    /10  SUBJECTIVE:  see eval    OBJECTIVE:     Exercise (TE) Resistance/Repetitions Other comments            PF strengthening                                  PF relaxation                 Self SI mobilization    x10            HEP instruction: Written HEP instructions provided and reviewed  Access Code: [email or web address]  URL: [email or web address]  Date: 2024  Prepared by: Etelvina Bolton     Exercises  - Supine Lower Trunk Rotation  - 1 x daily - 7 x weekly - 1 sets - 10 reps  - Supine Figure 4 Piriformis Stretch  - 1 x daily - 7 x weekly - 1 sets - 4 reps - 15-20sec. hold  - Child's Pose Stretch  - 1 x daily - 7 x weekly - 1 sets - 4 reps - 15-20sec. Hold      Other Treatment:   TA:  Bladder re-training/education:     Bladder Diary: patient educated on importance of filling out bladder diary at home, complete with

## 2024-04-02 ENCOUNTER — APPOINTMENT (OUTPATIENT)
Dept: PHYSICAL THERAPY | Age: 24
End: 2024-04-02
Payer: MEDICAID

## 2024-04-03 ENCOUNTER — HOSPITAL ENCOUNTER (OUTPATIENT)
Dept: PHYSICAL THERAPY | Age: 24
Setting detail: THERAPIES SERIES
Discharge: HOME | End: 2024-04-03
Payer: MEDICAID

## 2024-04-03 PROCEDURE — 97530 THERAPEUTIC ACTIVITIES: CPT

## 2024-04-03 PROCEDURE — 97110 THERAPEUTIC EXERCISES: CPT

## 2024-04-03 PROCEDURE — 97140 MANUAL THERAPY 1/> REGIONS: CPT

## 2024-04-03 NOTE — PROGRESS NOTES
[facility] - Outpatient Rehabilitation and Therapy, [address], [address]  [place]  [address]  Phone: [phone]  Fax [phone]      Physical Therapy Daily Treatment Note    Date:  4/3/2024    Patient Name:  Ana Laura Barragan    :  2000  MRN: [id number]    Restrictions/Precautions:  32 weeks pregnant, no pelvic exams  Allergies: Patient has no known allergies.   Preferred Language for Healthcare:   [x] English       [] other:    Medical Diagnosis Information:  Diagnosis: O99.891, M79.18 (ICD-10-CM) - Pain in symphysis pubis during pregnancy  Treatment Diagnosis:pelvic pain and LE weakness         Insurance/Certification information:  PT Insurance Information: [employer]  Physician Information:  Marie Barrett DO  Plan of care signed (Y/N):  N    Visit# / total visits:   exp. 24       Functional Outcome (if applicable):         PFDI-20: 69/300 (Lower score = Better function)          Time in:  4:15pm      Timed Treatment: 40min Total Treatment Time:  40min  ________________________________________________________________________________________    Pain Level:    0/10  SUBJECTIVE:  Patient reports no new complaints.  Reports she continues with pelvic rest orders per MD.      OBJECTIVE:     Exercise (TE) Resistance/Repetitions Other comments            PF strengthening                         Hook-lying SKC x10    Gastroc stretch x4B    HS stretch seated x4B    Hip Flexor stretch w/ SB x4B         Self SI mobilization    x10            HEP instruction: Written HEP instructions provided and reviewed  Access Code: [email or web address]  URL: [email or web address]  Date: 2024  Prepared by: Etelvina Bolton    Exercises  - Supine Lower Trunk Rotation  - 1 x daily - 7 x weekly - 1 sets - 10 reps  - Supine Figure 4 Piriformis Stretch  - 1 x daily - 7 x weekly - 1 sets - 4 reps - 15-20sec. hold  - Child's Pose Stretch  - 1 x daily - 7 x weekly - 1 sets - 4

## 2024-04-19 ENCOUNTER — HOSPITAL ENCOUNTER (OUTPATIENT)
Dept: PHYSICAL THERAPY | Age: 24
Setting detail: THERAPIES SERIES
Discharge: HOME | End: 2024-04-19
Payer: MEDICAID

## 2024-04-19 PROCEDURE — 97110 THERAPEUTIC EXERCISES: CPT

## 2024-04-19 NOTE — PROGRESS NOTES
[facility] - Outpatient Rehabilitation and Therapy, [address], [address]  [place]  [address]  Phone: [phone]  Fax [phone]      Physical Therapy Daily Treatment Note    Date:  2024    Patient Name:  Ana Laura Barragan    :  2000  MRN: [id number]    Restrictions/Precautions:  34 weeks pregnant, no pelvic exams  Allergies: Patient has no known allergies.   Preferred Language for Healthcare:   [x] English       [] other:    Medical Diagnosis Information:  Diagnosis: O99.891, M79.18 (ICD-10-CM) - Pain in symphysis pubis during pregnancy  Treatment Diagnosis:pelvic pain and LE weakness         Insurance/Certification information:  PT Insurance Information: [employer]  Physician Information:  Marie Barrett DO  Plan of care signed (Y/N):  N    Visit# / total visits:  3/9 exp. 24       Functional Outcome (if applicable):         PFDI-20: 69/300 (Lower score = Better function)          Time in:  11:00am      Timed Treatment: 25min Total Treatment Time:  25min  ________________________________________________________________________________________    Pain Level:    0/10  SUBJECTIVE:  Patient reports no new complaints.  Reports \"I am doing pretty good\".  Reports she continues with pelvic rest orders per MD.      OBJECTIVE:     Exercise (TE) Resistance/Repetitions Other comments            PF strengthening                         Hook-lying SKC x10    Gastroc stretch x4B    HS stretch seated x4B    Hip Flexor stretch w/ SB x4B         Self SI mobilization    x10            See HEP below Bold added today         HEP instruction: Written HEP instructions provided and reviewed  Access Code: [email or web address]  URL: [email or web address]  Date: 2024  Prepared by: Etelvina Bolton    Exercises  - Supine Lower Trunk Rotation  - 1 x daily - 7 x weekly - 1 sets - 10 reps  - Supine Figure 4 Piriformis Stretch  - 1 x daily - 7 x weekly - 1 sets - 4 reps -

## 2024-04-24 ENCOUNTER — HOSPITAL ENCOUNTER (OUTPATIENT)
Dept: PHYSICAL THERAPY | Age: 24
Setting detail: THERAPIES SERIES
Discharge: HOME | End: 2024-04-24
Payer: MEDICAID

## 2024-04-24 PROCEDURE — 97530 THERAPEUTIC ACTIVITIES: CPT

## 2024-04-24 PROCEDURE — 97110 THERAPEUTIC EXERCISES: CPT

## 2024-04-24 NOTE — PROGRESS NOTES
[facility] - Outpatient Rehabilitation and Therapy, [address], [address]  [place]  [address]  Phone: [phone]  Fax [phone]      Physical Therapy Daily Treatment Note    Date:  2024    Patient Name:  Ana Laura Barragan    :  2000  MRN: [id number]    Restrictions/Precautions:  35 weeks pregnant, no pelvic exams  Allergies: Patient has no known allergies.   Preferred Language for Healthcare:   [x] English       [] other:    Medical Diagnosis Information:  Diagnosis: O99.891, M79.18 (ICD-10-CM) - Pain in symphysis pubis during pregnancy  Treatment Diagnosis:pelvic pain and LE weakness         Insurance/Certification information:  PT Insurance Information: [employer]  Physician Information:  Marie Barrett DO  Plan of care signed (Y/N):  4    Visit# / total visits:   exp. 24       Functional Outcome (if applicable):         PFDI-20: 69/300 (Lower score = Better function)          Time in:  1:30pm      Timed Treatment: 25min Total Treatment Time:  25min  ________________________________________________________________________________________    Pain Level:    0/10  SUBJECTIVE:  Patient reports \"I am feeling pretty good today, I did have some pain yesterday but I deep cleaned my kitchen yesterday so that could be it\".  Reports she continues with pelvic rest orders per MD.      OBJECTIVE:     Exercise (TE) Resistance/Repetitions Other comments            PF strengthening                         Hook-lying SKC x10    Gastroc stretch x4B    HS stretch seated x4B    Hip Flexor stretch w/ SB x4B         Self SI mobilization    x10            See HEP below          HEP instruction: Written HEP instructions provided and reviewed  Access Code: [email or web address]  URL: [email or web address]  Date: 2024  Prepared by: Etelvina Bolton    Exercises  - Supine Lower Trunk Rotation  - 1 x daily - 7 x weekly - 1 sets - 10 reps  - Supine Figure 4 Piriformis 
- - -

## 2024-05-01 ENCOUNTER — HOSPITAL ENCOUNTER (OUTPATIENT)
Dept: PHYSICAL THERAPY | Age: 24
Setting detail: THERAPIES SERIES
Discharge: HOME | End: 2024-05-01
Payer: MEDICAID

## 2024-05-01 PROCEDURE — 97110 THERAPEUTIC EXERCISES: CPT

## 2024-05-01 PROCEDURE — 97530 THERAPEUTIC ACTIVITIES: CPT

## 2024-05-01 NOTE — PROGRESS NOTES
[facility] - Outpatient Rehabilitation and Therapy, [id number], [address]  [place]  [address]  Phone: [phone]  Fax [phone]      Physical Therapy Daily Treatment Note    Date:  2024    Patient Name:  Ana Laura Barragan    :  2000  MRN: [id number]    Restrictions/Precautions:  36 weeks pregnant, no pelvic exams  Allergies: Patient has no known allergies.   Preferred Language for Healthcare:   [x] English       [] other:    Medical Diagnosis Information:  Diagnosis: O99.891, M79.18 (ICD-10-CM) - Pain in symphysis pubis during pregnancy  Treatment Diagnosis:pelvic pain and LE weakness         Insurance/Certification information:  PT Insurance Information: [employer]  Physician Information:  Marie Barrett DO  Plan of care signed (Y/N):  4    Visit# / total visits:   exp. 24       Functional Outcome (if applicable):         PFDI-20: 38/300 (was 69/300) (Lower score = Better function)          Time in:  1:30pm      Timed Treatment: 27min Total Treatment Time:  27min  ________________________________________________________________________________________    Pain Level:    0/10  SUBJECTIVE:  Patient reports \"I am feeling pretty good today, I did have some pain yesterday but I deep cleaned my kitchen yesterday so that could be it\".  Reports she continues with pelvic rest orders per MD.      OBJECTIVE:     Exercise (TE) Resistance/Repetitions Other comments            PF strengthening                         Hook-lying SKC x10    Gastroc stretch x4B    HS stretch seated x4B    Hip Flexor stretch w/ SB x4B         Self SI mobilization    x10            See HEP below Bold added today         HEP instruction: Written HEP instructions provided and reviewed  Access Code: [email or web address]  URL: [email or web address]  Date: 2024  Prepared by: Etelvina Bolton    Exercises  - Supine Lower Trunk Rotation  - 1 x daily - 7 x weekly - 1 sets - 10 reps  -

## 2024-05-10 LAB — GBS, EXTERNAL RESULT: POSITIVE

## 2024-05-22 ENCOUNTER — HOSPITAL ENCOUNTER (OUTPATIENT)
Age: 24
Discharge: HOME | End: 2024-05-22
Attending: OBSTETRICS & GYNECOLOGY | Admitting: OBSTETRICS & GYNECOLOGY
Payer: MEDICAID

## 2024-05-22 VITALS
RESPIRATION RATE: 18 BRPM | DIASTOLIC BLOOD PRESSURE: 55 MMHG | BODY MASS INDEX: 34.17 KG/M2 | WEIGHT: 181 LBS | OXYGEN SATURATION: 97 % | HEART RATE: 93 BPM | SYSTOLIC BLOOD PRESSURE: 101 MMHG | HEIGHT: 61 IN | TEMPERATURE: 98.1 F

## 2024-05-22 LAB
ALBUMIN SERPL-MCNC: 3.5 G/DL (ref 3.4–5)
ALBUMIN/GLOB SERPL: 1.3 {RATIO} (ref 1.1–2.2)
ALP SERPL-CCNC: 146 U/L (ref 40–129)
ALT SERPL-CCNC: <5 U/L (ref 10–40)
ANION GAP SERPL CALCULATED.3IONS-SCNC: 13 MMOL/L (ref 3–16)
AST SERPL-CCNC: 9 U/L (ref 15–37)
BACTERIA URNS QL MICRO: ABNORMAL /HPF
BASOPHILS # BLD: 0 K/UL (ref 0–0.2)
BASOPHILS NFR BLD: 0.5 %
BILIRUB SERPL-MCNC: 0.5 MG/DL (ref 0–1)
BILIRUB UR QL STRIP.AUTO: NEGATIVE
BUN SERPL-MCNC: 6 MG/DL (ref 7–20)
CALCIUM SERPL-MCNC: 8.9 MG/DL (ref 8.3–10.6)
CHLORIDE SERPL-SCNC: 103 MMOL/L (ref 99–110)
CLARITY UR: ABNORMAL
CO2 SERPL-SCNC: 20 MMOL/L (ref 21–32)
COLOR UR: YELLOW
CREAT SERPL-MCNC: <0.5 MG/DL (ref 0.6–1.1)
CREAT UR-MCNC: 120.9 MG/DL (ref 28–259)
DEPRECATED RDW RBC AUTO: 13.5 % (ref 12.4–15.4)
EOSINOPHIL # BLD: 0.1 K/UL (ref 0–0.6)
EOSINOPHIL NFR BLD: 1.1 %
GFR SERPLBLD CREATININE-BSD FMLA CKD-EPI: >90 ML/MIN/{1.73_M2}
GLUCOSE SERPL-MCNC: 177 MG/DL (ref 70–99)
GLUCOSE UR STRIP.AUTO-MCNC: NEGATIVE MG/DL
HCT VFR BLD AUTO: 35 % (ref 36–48)
HGB BLD-MCNC: 12.3 G/DL (ref 12–16)
HGB UR QL STRIP.AUTO: NEGATIVE
KETONES UR STRIP.AUTO-MCNC: NEGATIVE MG/DL
LEUKOCYTE ESTERASE UR QL STRIP.AUTO: ABNORMAL
LYMPHOCYTES # BLD: 1.7 K/UL (ref 1–5.1)
LYMPHOCYTES NFR BLD: 15.4 %
MCH RBC QN AUTO: 31.3 PG (ref 26–34)
MCHC RBC AUTO-ENTMCNC: 35.1 G/DL (ref 31–36)
MCV RBC AUTO: 89.3 FL (ref 80–100)
MONOCYTES # BLD: 0.4 K/UL (ref 0–1.3)
MONOCYTES NFR BLD: 3.3 %
NEUTROPHILS # BLD: 8.7 K/UL (ref 1.7–7.7)
NEUTROPHILS NFR BLD: 79.7 %
NITRITE UR QL STRIP.AUTO: NEGATIVE
PH UR STRIP.AUTO: 7 [PH] (ref 5–8)
PLATELET # BLD AUTO: 261 K/UL (ref 135–450)
PMV BLD AUTO: 7.2 FL (ref 5–10.5)
POTASSIUM SERPL-SCNC: 3.7 MMOL/L (ref 3.5–5.1)
PROT SERPL-MCNC: 6.3 G/DL (ref 6.4–8.2)
PROT UR STRIP.AUTO-MCNC: NEGATIVE MG/DL
PROT UR-MCNC: 12 MG/DL
PROT/CREAT UR-RTO: 0.1 MG/DL
RBC # BLD AUTO: 3.92 M/UL (ref 4–5.2)
RBC #/AREA URNS HPF: ABNORMAL /HPF (ref 0–4)
RENAL EPI CELLS #/AREA UR COMP ASSIST: ABNORMAL /HPF (ref 0–1)
SODIUM SERPL-SCNC: 136 MMOL/L (ref 136–145)
SP GR UR STRIP.AUTO: 1.01 (ref 1–1.03)
UA DIPSTICK W REFLEX MICRO PNL UR: YES
URATE SERPL-MCNC: 5.1 MG/DL (ref 2.6–6)
URN SPEC COLLECT METH UR: ABNORMAL
UROBILINOGEN UR STRIP-ACNC: 0.2 E.U./DL
WBC # BLD AUTO: 10.9 K/UL (ref 4–11)
WBC #/AREA URNS HPF: ABNORMAL /HPF (ref 0–5)

## 2024-05-22 PROCEDURE — 81001 URINALYSIS AUTO W/SCOPE: CPT

## 2024-05-22 PROCEDURE — 2580000003 HC RX 258: Performed by: STUDENT IN AN ORGANIZED HEALTH CARE EDUCATION/TRAINING PROGRAM

## 2024-05-22 PROCEDURE — 84156 ASSAY OF PROTEIN URINE: CPT

## 2024-05-22 PROCEDURE — 82570 ASSAY OF URINE CREATININE: CPT

## 2024-05-22 PROCEDURE — 80053 COMPREHEN METABOLIC PANEL: CPT

## 2024-05-22 PROCEDURE — 85025 COMPLETE CBC W/AUTO DIFF WBC: CPT

## 2024-05-22 PROCEDURE — 84550 ASSAY OF BLOOD/URIC ACID: CPT

## 2024-05-22 PROCEDURE — 99213 OFFICE O/P EST LOW 20 MIN: CPT

## 2024-05-22 RX ORDER — SODIUM CHLORIDE, SODIUM LACTATE, POTASSIUM CHLORIDE, CALCIUM CHLORIDE 600; 310; 30; 20 MG/100ML; MG/100ML; MG/100ML; MG/100ML
INJECTION, SOLUTION INTRAVENOUS CONTINUOUS
Status: DISCONTINUED | OUTPATIENT
Start: 2024-05-22 | End: 2024-05-22 | Stop reason: HOSPADM

## 2024-05-22 RX ORDER — ALBUTEROL SULFATE 90 UG/1
2 AEROSOL, METERED RESPIRATORY (INHALATION)
COMMUNITY

## 2024-05-22 RX ORDER — LORATADINE 10 MG/1
10 TABLET ORAL DAILY
COMMUNITY
Start: 2024-02-16

## 2024-05-22 RX ORDER — ONDANSETRON 2 MG/ML
4 INJECTION INTRAMUSCULAR; INTRAVENOUS EVERY 6 HOURS PRN
Status: DISCONTINUED | OUTPATIENT
Start: 2024-05-22 | End: 2024-05-22 | Stop reason: HOSPADM

## 2024-05-22 RX ORDER — FERROUS SULFATE 325(65) MG
1 TABLET ORAL 2 TIMES DAILY
COMMUNITY
Start: 2024-05-14

## 2024-05-22 RX ADMIN — SODIUM CHLORIDE, POTASSIUM CHLORIDE, SODIUM LACTATE AND CALCIUM CHLORIDE: 600; 310; 30; 20 INJECTION, SOLUTION INTRAVENOUS at 13:22

## 2024-05-22 NOTE — PROGRESS NOTES
Pt presents to [facility] for headache, nausea, diarrhea x3, decreased fetal movement today, increased pelvic pressure and back back. Pt ambulated to triage room 1. Pt changed into gown and urine specimen obtained

## 2024-05-22 NOTE — PROGRESS NOTES
D/c instructions given. Pt verbalized understanding and denied questions. Pt left OB unit @ 1548 in stable condition, ambulatory and undelivered. Not in active labor.

## 2024-05-22 NOTE — H&P
Sexual Activity    Alcohol use: Yes     Alcohol/week: 1.0 standard drink of alcohol     Types: 1 Glasses of wine per week    Drug use: Not Currently     Types: Marijuana (Weed)     Comment: last used in Feb 2024    Sexual activity: Yes     Partners: Male   Other Topics Concern    Not on file   Social History Narrative    Not on file     Social Determinants of Health     Financial Resource Strain: Not on file   Food Insecurity: Not on file   Transportation Needs: Not on file   Physical Activity: Not on file   Stress: Not on file   Social Connections: Not on file   Intimate Partner Violence: Not on file   Housing Stability: Not on file     Family History:       Adopted: Yes     Medications Prior to Admission:  Medications Prior to Admission: loratadine (CLARITIN) 10 MG tablet, Take 1 tablet by mouth daily  FEROSUL 325 (65 Fe) MG tablet, Take 1 tablet by mouth 2 times daily  Acetaminophen-guaiFENesin 325-200 MG TABS,   albuterol sulfate HFA (PROVENTIL;VENTOLIN;PROAIR) 108 (90 Base) MCG/ACT inhaler, 2 puffs  Prenatal Multivit-Min-Fe-FA (PRENATAL 1 + IRON PO), Take 2 Doses by mouth daily  metoclopramide (REGLAN) 10 MG tablet, Take 1 tablet by mouth 3 times daily as needed (nausea) WARNING:  May cause drowsiness.  May impair ability to operate vehicles or machinery.  Do not use in combination with alcohol. (Patient not taking: Reported on 5/22/2024)  acetaminophen (TYLENOL) 500 MG tablet, Take 1 tablet by mouth every 6 hours as needed for Pain  ibuprofen (ADVIL;MOTRIN) 600 MG tablet, Take 1 tablet by mouth every 6 hours as needed for Pain (Patient not taking: Reported on 5/22/2024)  FLUoxetine (PROZAC) 20 MG capsule, Take 1 capsule by mouth daily (Patient not taking: Reported on 3/26/2024)  hydrOXYzine HCl (ATARAX) 25 MG tablet, Take 1 tablet by mouth in the morning and at bedtime (Patient not taking: Reported on 3/26/2024)    REVIEW OF SYSTEMS:    wnl    PHYSICAL EXAM:  Vitals:    05/22/24 1409 05/22/24 1418 05/22/24

## 2024-05-22 NOTE — PROGRESS NOTES
RN reviewed lab results with Dr. Bhagat, as well as FHR tracing, contraction pattern and pt complaints. Rn to recheck patient when iv fluids are completed and report findnings

## 2024-05-22 NOTE — PROGRESS NOTES
1310: Pt put on efm.  1320: Pt states she is not experiencing vaginal bleeding or leaking of amniotic fluid. Pt states she has had a dull headache fore the last few days. Pt denies visual changes, epigastric pain. PT complains of nausea and diarrhea today and decreased fetal movement, though pt has felt baby move since being here. IV started, labs drawn and iv fluids started per orders. Pt declines zofran at this time  1330: SVE done pt is 1cm, firm and posterior.

## 2024-05-23 ENCOUNTER — HOSPITAL ENCOUNTER (OUTPATIENT)
Age: 24
Discharge: HOME | End: 2024-05-24
Attending: STUDENT IN AN ORGANIZED HEALTH CARE EDUCATION/TRAINING PROGRAM | Admitting: STUDENT IN AN ORGANIZED HEALTH CARE EDUCATION/TRAINING PROGRAM
Payer: MEDICAID

## 2024-05-23 VITALS
HEART RATE: 83 BPM | OXYGEN SATURATION: 96 % | TEMPERATURE: 98 F | SYSTOLIC BLOOD PRESSURE: 97 MMHG | DIASTOLIC BLOOD PRESSURE: 53 MMHG | RESPIRATION RATE: 16 BRPM

## 2024-05-24 ENCOUNTER — ANESTHESIA EVENT (OUTPATIENT)
Dept: LABOR AND DELIVERY | Age: 24
DRG: 540 | End: 2024-05-24
Payer: MEDICAID

## 2024-05-24 PROCEDURE — 99211 OFF/OP EST MAY X REQ PHY/QHP: CPT

## 2024-05-24 NOTE — PROGRESS NOTES
Pt presents to triage with c/o SROM at 2000. Pt states has had several episodes of leaking clear fluid since that time. Pt denies any vaginal bleeding and reports good fetal movement. Pt taken to triage #2 and instructed to change into gown and collect urine specimen.

## 2024-05-24 NOTE — H&P
Department of Obstetrics and Gynecology  Labor and Delivery  Attending Triage Note      SUBJECTIVE:  Pt. 24 yo  at 39.0w px with complaints of small episodes of leakage of watery fluid since 8 pm. Reports she was in the bath and noticed multiple episodes following. Denies larges gushes or VB. Reports good fetal movement. Reports irregular ctx. Denies recent intercourse.     OBJECTIVE    Vitals:    CONSTITUTIONAL:  awake, alert, cooperative, no apparent distress, and appears stated age  LUNGS:  No increased work of breathing  CARDIOVASCULAR:  Normal rate  ABDOMEN: Gravid, no scars, normal bowel sounds, soft, non-distended, non-tender  EXT: No C/C/E  SPECULUM: no active pooling on valsalva, minimal thin white discharge, fern negative     Cervix:             Dilation:  1 cm         Effacement:  50%         Station:  -3 cm         Consistency:  medium         Position:  posterior    Fetal Position:  Cephalic    Membranes:  Intact    Fetal heart rate:         Baseline Heart Rate:  125        Accelerations:  present       Decelerations:  absent       Variability:  moderate    Contraction frequency: 1-10, irregular       ASSESSMENT & PLAN:      Ana Laura HEMPHILL Centers 24 yo  at 39w0d rule out rupture  - Negative pooling, negative fern  - cervix 1 cm, same as office check   - Fetal status- NST reactive  - Pt discharged in stable condition, scheduled for RLTCS

## 2024-05-24 NOTE — PROGRESS NOTES
Pt verbalized understanding of verbal and written discharge instructions and denies having questions at this time. Pt left OB unit at 0022 ambulatory, undelivered, and in stable condition, accompanied by self. Patient is not in active labor.

## 2024-05-27 ENCOUNTER — PREP FOR PROCEDURE (OUTPATIENT)
Dept: OBGYN | Age: 24
End: 2024-05-27

## 2024-05-27 RX ORDER — SODIUM CHLORIDE 0.9 % (FLUSH) 0.9 %
5-40 SYRINGE (ML) INJECTION EVERY 12 HOURS SCHEDULED
Status: CANCELLED | OUTPATIENT
Start: 2024-05-27

## 2024-05-27 RX ORDER — ONDANSETRON 2 MG/ML
4 INJECTION INTRAMUSCULAR; INTRAVENOUS EVERY 6 HOURS PRN
Status: CANCELLED | OUTPATIENT
Start: 2024-05-27

## 2024-05-27 RX ORDER — SODIUM CHLORIDE, SODIUM LACTATE, POTASSIUM CHLORIDE, CALCIUM CHLORIDE 600; 310; 30; 20 MG/100ML; MG/100ML; MG/100ML; MG/100ML
INJECTION, SOLUTION INTRAVENOUS CONTINUOUS
Status: CANCELLED | OUTPATIENT
Start: 2024-05-27

## 2024-05-27 RX ORDER — FAMOTIDINE 10 MG/ML
20 INJECTION, SOLUTION INTRAVENOUS ONCE
Status: CANCELLED | OUTPATIENT
Start: 2024-05-27 | End: 2024-05-27

## 2024-05-27 RX ORDER — SODIUM CHLORIDE 0.9 % (FLUSH) 0.9 %
10 SYRINGE (ML) INJECTION PRN
Status: CANCELLED | OUTPATIENT
Start: 2024-05-27

## 2024-05-27 RX ORDER — ACETAMINOPHEN 325 MG/1
975 TABLET ORAL ONCE
Status: CANCELLED | OUTPATIENT
Start: 2024-05-27 | End: 2024-05-27

## 2024-05-27 RX ORDER — METOCLOPRAMIDE HYDROCHLORIDE 5 MG/ML
10 INJECTION INTRAMUSCULAR; INTRAVENOUS ONCE
Status: CANCELLED | OUTPATIENT
Start: 2024-05-27 | End: 2024-05-27

## 2024-05-27 RX ORDER — SODIUM CHLORIDE, SODIUM LACTATE, POTASSIUM CHLORIDE, AND CALCIUM CHLORIDE .6; .31; .03; .02 G/100ML; G/100ML; G/100ML; G/100ML
1000 INJECTION, SOLUTION INTRAVENOUS ONCE
Status: CANCELLED | OUTPATIENT
Start: 2024-05-27 | End: 2024-05-27

## 2024-05-27 RX ORDER — CITRIC ACID/SODIUM CITRATE 334-500MG
30 SOLUTION, ORAL ORAL ONCE
Status: CANCELLED | OUTPATIENT
Start: 2024-05-27 | End: 2024-05-27

## 2024-05-27 RX ORDER — SODIUM CHLORIDE 9 MG/ML
INJECTION, SOLUTION INTRAVENOUS PRN
Status: CANCELLED | OUTPATIENT
Start: 2024-05-27

## 2024-05-28 ENCOUNTER — HOSPITAL ENCOUNTER (INPATIENT)
Age: 24
LOS: 2 days | Discharge: HOME | DRG: 540 | End: 2024-05-30
Attending: STUDENT IN AN ORGANIZED HEALTH CARE EDUCATION/TRAINING PROGRAM | Admitting: OBSTETRICS & GYNECOLOGY
Payer: MEDICAID

## 2024-05-28 ENCOUNTER — ANESTHESIA (OUTPATIENT)
Dept: LABOR AND DELIVERY | Age: 24
DRG: 540 | End: 2024-05-28
Payer: MEDICAID

## 2024-05-28 DIAGNOSIS — G89.18 POST-OPERATIVE PAIN: Primary | ICD-10-CM

## 2024-05-28 PROBLEM — F41.8 DEPRESSION WITH ANXIETY: Status: ACTIVE | Noted: 2024-05-28

## 2024-05-28 PROBLEM — Z98.891 H/O CESAREAN SECTION: Status: ACTIVE | Noted: 2024-05-28

## 2024-05-28 PROBLEM — Z98.891 S/P CESAREAN SECTION: Status: ACTIVE | Noted: 2024-05-28

## 2024-05-28 PROBLEM — O99.213 OBESITY AFFECTING PREGNANCY IN THIRD TRIMESTER: Status: ACTIVE | Noted: 2024-05-28

## 2024-05-28 LAB
ABO + RH BLD: NORMAL
AMPHETAMINES UR QL SCN>1000 NG/ML: NORMAL
BARBITURATES UR QL SCN>200 NG/ML: NORMAL
BENZODIAZ UR QL SCN>200 NG/ML: NORMAL
BLD GP AB SCN SERPL QL: NORMAL
BUPRENORPHINE+NOR UR QL SCN: NORMAL
CANNABINOIDS UR QL SCN>50 NG/ML: NORMAL
COCAINE UR QL SCN: NORMAL
DEPRECATED RDW RBC AUTO: 14 % (ref 12.4–15.4)
DRUG SCREEN COMMENT UR-IMP: NORMAL
FENTANYL SCREEN, URINE: NORMAL
HCT VFR BLD AUTO: 34.9 % (ref 36–48)
HGB BLD-MCNC: 12.3 G/DL (ref 12–16)
MCH RBC QN AUTO: 31.4 PG (ref 26–34)
MCHC RBC AUTO-ENTMCNC: 35.3 G/DL (ref 31–36)
MCV RBC AUTO: 88.8 FL (ref 80–100)
METHADONE UR QL SCN>300 NG/ML: NORMAL
OPIATES UR QL SCN>300 NG/ML: NORMAL
OXYCODONE UR QL SCN: NORMAL
PCP UR QL SCN>25 NG/ML: NORMAL
PH UR STRIP: 6 [PH]
PLATELET # BLD AUTO: 240 K/UL (ref 135–450)
PMV BLD AUTO: 7.4 FL (ref 5–10.5)
RBC # BLD AUTO: 3.93 M/UL (ref 4–5.2)
REAGIN+T PALLIDUM IGG+IGM SERPL-IMP: NORMAL
WBC # BLD AUTO: 10.7 K/UL (ref 4–11)

## 2024-05-28 PROCEDURE — 3700000000 HC ANESTHESIA ATTENDED CARE: Performed by: OBSTETRICS & GYNECOLOGY

## 2024-05-28 PROCEDURE — 2709999900 HC NON-CHARGEABLE SUPPLY: Performed by: OBSTETRICS & GYNECOLOGY

## 2024-05-28 PROCEDURE — 2500000003 HC RX 250 WO HCPCS: Performed by: NURSE ANESTHETIST, CERTIFIED REGISTERED

## 2024-05-28 PROCEDURE — 3700000001 HC ADD 15 MINUTES (ANESTHESIA): Performed by: OBSTETRICS & GYNECOLOGY

## 2024-05-28 PROCEDURE — 6360000002 HC RX W HCPCS: Performed by: NURSE ANESTHETIST, CERTIFIED REGISTERED

## 2024-05-28 PROCEDURE — 7100000001 HC PACU RECOVERY - ADDTL 15 MIN: Performed by: OBSTETRICS & GYNECOLOGY

## 2024-05-28 PROCEDURE — 6360000002 HC RX W HCPCS

## 2024-05-28 PROCEDURE — 6360000002 HC RX W HCPCS: Performed by: OBSTETRICS & GYNECOLOGY

## 2024-05-28 PROCEDURE — 86780 TREPONEMA PALLIDUM: CPT

## 2024-05-28 PROCEDURE — 86901 BLOOD TYPING SEROLOGIC RH(D): CPT

## 2024-05-28 PROCEDURE — 1220000000 HC SEMI PRIVATE OB R&B

## 2024-05-28 PROCEDURE — 86850 RBC ANTIBODY SCREEN: CPT

## 2024-05-28 PROCEDURE — 6370000000 HC RX 637 (ALT 250 FOR IP): Performed by: OBSTETRICS & GYNECOLOGY

## 2024-05-28 PROCEDURE — 80307 DRUG TEST PRSMV CHEM ANLYZR: CPT

## 2024-05-28 PROCEDURE — 85027 COMPLETE CBC AUTOMATED: CPT

## 2024-05-28 PROCEDURE — 2580000003 HC RX 258: Performed by: OBSTETRICS & GYNECOLOGY

## 2024-05-28 PROCEDURE — 7100000000 HC PACU RECOVERY - FIRST 15 MIN: Performed by: OBSTETRICS & GYNECOLOGY

## 2024-05-28 PROCEDURE — 86900 BLOOD TYPING SEROLOGIC ABO: CPT

## 2024-05-28 PROCEDURE — 3609079900 HC CESAREAN SECTION: Performed by: OBSTETRICS & GYNECOLOGY

## 2024-05-28 RX ORDER — KETOROLAC TROMETHAMINE 30 MG/ML
INJECTION, SOLUTION INTRAMUSCULAR; INTRAVENOUS
Status: COMPLETED
Start: 2024-05-28 | End: 2024-05-28

## 2024-05-28 RX ORDER — LANOLIN 100 %
OINTMENT (GRAM) TOPICAL
Status: DISCONTINUED | OUTPATIENT
Start: 2024-05-28 | End: 2024-05-30 | Stop reason: HOSPADM

## 2024-05-28 RX ORDER — SODIUM CHLORIDE 0.9 % (FLUSH) 0.9 %
5-40 SYRINGE (ML) INJECTION EVERY 12 HOURS SCHEDULED
Status: DISCONTINUED | OUTPATIENT
Start: 2024-05-28 | End: 2024-05-30 | Stop reason: HOSPADM

## 2024-05-28 RX ORDER — ONDANSETRON 4 MG/1
4 TABLET, ORALLY DISINTEGRATING ORAL EVERY 8 HOURS PRN
Status: DISCONTINUED | OUTPATIENT
Start: 2024-05-28 | End: 2024-05-30 | Stop reason: HOSPADM

## 2024-05-28 RX ORDER — SODIUM CHLORIDE, SODIUM LACTATE, POTASSIUM CHLORIDE, AND CALCIUM CHLORIDE .6; .31; .03; .02 G/100ML; G/100ML; G/100ML; G/100ML
1000 INJECTION, SOLUTION INTRAVENOUS ONCE
Status: COMPLETED | OUTPATIENT
Start: 2024-05-28 | End: 2024-05-28

## 2024-05-28 RX ORDER — SODIUM CHLORIDE 9 MG/ML
INJECTION, SOLUTION INTRAVENOUS PRN
Status: DISCONTINUED | OUTPATIENT
Start: 2024-05-28 | End: 2024-05-30 | Stop reason: HOSPADM

## 2024-05-28 RX ORDER — SODIUM CHLORIDE 0.9 % (FLUSH) 0.9 %
5-40 SYRINGE (ML) INJECTION PRN
Status: DISCONTINUED | OUTPATIENT
Start: 2024-05-28 | End: 2024-05-30 | Stop reason: HOSPADM

## 2024-05-28 RX ORDER — MORPHINE SULFATE 0.5 MG/ML
INJECTION, SOLUTION EPIDURAL; INTRATHECAL; INTRAVENOUS PRN
Status: DISCONTINUED | OUTPATIENT
Start: 2024-05-28 | End: 2024-05-28 | Stop reason: SDUPTHER

## 2024-05-28 RX ORDER — SODIUM CHLORIDE 0.9 % (FLUSH) 0.9 %
10 SYRINGE (ML) INJECTION PRN
Status: DISCONTINUED | OUTPATIENT
Start: 2024-05-28 | End: 2024-05-28

## 2024-05-28 RX ORDER — OXYCODONE HYDROCHLORIDE 5 MG/1
5 TABLET ORAL EVERY 4 HOURS PRN
Status: DISCONTINUED | OUTPATIENT
Start: 2024-05-28 | End: 2024-05-30 | Stop reason: HOSPADM

## 2024-05-28 RX ORDER — KETOROLAC TROMETHAMINE 30 MG/ML
30 INJECTION, SOLUTION INTRAMUSCULAR; INTRAVENOUS EVERY 6 HOURS
Status: COMPLETED | OUTPATIENT
Start: 2024-05-28 | End: 2024-05-28

## 2024-05-28 RX ORDER — SODIUM CHLORIDE, SODIUM LACTATE, POTASSIUM CHLORIDE, CALCIUM CHLORIDE 600; 310; 30; 20 MG/100ML; MG/100ML; MG/100ML; MG/100ML
INJECTION, SOLUTION INTRAVENOUS CONTINUOUS
Status: DISCONTINUED | OUTPATIENT
Start: 2024-05-28 | End: 2024-05-30 | Stop reason: HOSPADM

## 2024-05-28 RX ORDER — FERROUS SULFATE 325(65) MG
325 TABLET ORAL EVERY OTHER DAY
Status: DISCONTINUED | OUTPATIENT
Start: 2024-05-28 | End: 2024-05-29

## 2024-05-28 RX ORDER — DOCUSATE SODIUM 100 MG/1
100 CAPSULE, LIQUID FILLED ORAL 2 TIMES DAILY
Status: DISCONTINUED | OUTPATIENT
Start: 2024-05-28 | End: 2024-05-30 | Stop reason: HOSPADM

## 2024-05-28 RX ORDER — ACETAMINOPHEN 325 MG/1
975 TABLET ORAL ONCE
Status: DISCONTINUED | OUTPATIENT
Start: 2024-05-28 | End: 2024-05-28

## 2024-05-28 RX ORDER — FAMOTIDINE 10 MG/ML
20 INJECTION, SOLUTION INTRAVENOUS 2 TIMES DAILY
Status: DISCONTINUED | OUTPATIENT
Start: 2024-05-28 | End: 2024-05-30 | Stop reason: HOSPADM

## 2024-05-28 RX ORDER — ACETAMINOPHEN 500 MG
1000 TABLET ORAL EVERY 8 HOURS SCHEDULED
Status: DISCONTINUED | OUTPATIENT
Start: 2024-05-28 | End: 2024-05-30 | Stop reason: HOSPADM

## 2024-05-28 RX ORDER — CEFAZOLIN SODIUM IN 0.9 % NACL 2 G/100 ML
2000 PLASTIC BAG, INJECTION (ML) INTRAVENOUS ONCE
Status: DISCONTINUED | OUTPATIENT
Start: 2024-05-28 | End: 2024-05-28

## 2024-05-28 RX ORDER — SODIUM CHLORIDE, SODIUM LACTATE, POTASSIUM CHLORIDE, CALCIUM CHLORIDE 600; 310; 30; 20 MG/100ML; MG/100ML; MG/100ML; MG/100ML
INJECTION, SOLUTION INTRAVENOUS CONTINUOUS
Status: DISCONTINUED | OUTPATIENT
Start: 2024-05-28 | End: 2024-05-28

## 2024-05-28 RX ORDER — BUPIVACAINE HYDROCHLORIDE 7.5 MG/ML
INJECTION, SOLUTION INTRASPINAL PRN
Status: DISCONTINUED | OUTPATIENT
Start: 2024-05-28 | End: 2024-05-28 | Stop reason: SDUPTHER

## 2024-05-28 RX ORDER — FAMOTIDINE 10 MG/ML
20 INJECTION, SOLUTION INTRAVENOUS ONCE
Status: DISCONTINUED | OUTPATIENT
Start: 2024-05-28 | End: 2024-05-28

## 2024-05-28 RX ORDER — FAMOTIDINE 10 MG/ML
INJECTION, SOLUTION INTRAVENOUS PRN
Status: DISCONTINUED | OUTPATIENT
Start: 2024-05-28 | End: 2024-05-28 | Stop reason: SDUPTHER

## 2024-05-28 RX ORDER — CITRIC ACID/SODIUM CITRATE 334-500MG
30 SOLUTION, ORAL ORAL ONCE
Status: DISCONTINUED | OUTPATIENT
Start: 2024-05-28 | End: 2024-05-28

## 2024-05-28 RX ORDER — SODIUM CHLORIDE 9 MG/ML
INJECTION, SOLUTION INTRAVENOUS PRN
Status: DISCONTINUED | OUTPATIENT
Start: 2024-05-28 | End: 2024-05-28

## 2024-05-28 RX ORDER — OXYCODONE HYDROCHLORIDE 5 MG/1
10 TABLET ORAL EVERY 4 HOURS PRN
Status: DISCONTINUED | OUTPATIENT
Start: 2024-05-28 | End: 2024-05-30 | Stop reason: HOSPADM

## 2024-05-28 RX ORDER — EPHEDRINE SULFATE 50 MG/ML
INJECTION INTRAVENOUS PRN
Status: DISCONTINUED | OUTPATIENT
Start: 2024-05-28 | End: 2024-05-28 | Stop reason: SDUPTHER

## 2024-05-28 RX ORDER — MIDAZOLAM HYDROCHLORIDE 5 MG/ML
INJECTION INTRAMUSCULAR; INTRAVENOUS PRN
Status: DISCONTINUED | OUTPATIENT
Start: 2024-05-28 | End: 2024-05-28 | Stop reason: SDUPTHER

## 2024-05-28 RX ORDER — FAMOTIDINE 20 MG/1
20 TABLET, FILM COATED ORAL 2 TIMES DAILY
Status: DISCONTINUED | OUTPATIENT
Start: 2024-05-28 | End: 2024-05-30 | Stop reason: HOSPADM

## 2024-05-28 RX ORDER — ONDANSETRON 2 MG/ML
4 INJECTION INTRAMUSCULAR; INTRAVENOUS EVERY 6 HOURS PRN
Status: DISCONTINUED | OUTPATIENT
Start: 2024-05-28 | End: 2024-05-30 | Stop reason: HOSPADM

## 2024-05-28 RX ORDER — METOCLOPRAMIDE HYDROCHLORIDE 5 MG/ML
10 INJECTION INTRAMUSCULAR; INTRAVENOUS ONCE
Status: DISCONTINUED | OUTPATIENT
Start: 2024-05-28 | End: 2024-05-28

## 2024-05-28 RX ORDER — DIPHENHYDRAMINE HCL 25 MG
25 TABLET ORAL EVERY 6 HOURS PRN
Status: DISCONTINUED | OUTPATIENT
Start: 2024-05-28 | End: 2024-05-30 | Stop reason: HOSPADM

## 2024-05-28 RX ORDER — TRANEXAMIC ACID 10 MG/ML
1000 INJECTION, SOLUTION INTRAVENOUS
Status: DISPENSED | OUTPATIENT
Start: 2024-05-28 | End: 2024-05-29

## 2024-05-28 RX ORDER — OXYTOCIN 10 [USP'U]/ML
INJECTION, SOLUTION INTRAMUSCULAR; INTRAVENOUS PRN
Status: DISCONTINUED | OUTPATIENT
Start: 2024-05-28 | End: 2024-05-28 | Stop reason: SDUPTHER

## 2024-05-28 RX ORDER — MISOPROSTOL 100 UG/1
800 TABLET ORAL PRN
Status: COMPLETED | OUTPATIENT
Start: 2024-05-28 | End: 2024-05-28

## 2024-05-28 RX ORDER — FENTANYL CITRATE 50 UG/ML
INJECTION, SOLUTION INTRAMUSCULAR; INTRAVENOUS PRN
Status: DISCONTINUED | OUTPATIENT
Start: 2024-05-28 | End: 2024-05-28 | Stop reason: SDUPTHER

## 2024-05-28 RX ORDER — IBUPROFEN 800 MG/1
800 TABLET ORAL EVERY 8 HOURS
Status: DISCONTINUED | OUTPATIENT
Start: 2024-05-29 | End: 2024-05-30 | Stop reason: HOSPADM

## 2024-05-28 RX ORDER — ONDANSETRON 2 MG/ML
INJECTION INTRAMUSCULAR; INTRAVENOUS PRN
Status: DISCONTINUED | OUTPATIENT
Start: 2024-05-28 | End: 2024-05-28 | Stop reason: SDUPTHER

## 2024-05-28 RX ORDER — METHYLERGONOVINE MALEATE 0.2 MG/ML
200 INJECTION INTRAVENOUS PRN
Status: DISCONTINUED | OUTPATIENT
Start: 2024-05-28 | End: 2024-05-30 | Stop reason: HOSPADM

## 2024-05-28 RX ORDER — DIPHENHYDRAMINE HYDROCHLORIDE 50 MG/ML
25 INJECTION INTRAMUSCULAR; INTRAVENOUS EVERY 6 HOURS PRN
Status: DISCONTINUED | OUTPATIENT
Start: 2024-05-28 | End: 2024-05-30 | Stop reason: HOSPADM

## 2024-05-28 RX ORDER — PRENATAL WITH FERROUS FUM AND FOLIC ACID 3080; 920; 120; 400; 22; 1.84; 3; 20; 10; 1; 12; 200; 27; 25; 2 [IU]/1; [IU]/1; MG/1; [IU]/1; MG/1; MG/1; MG/1; MG/1; MG/1; MG/1; UG/1; MG/1; MG/1; MG/1; MG/1
1 TABLET ORAL DAILY
Status: DISCONTINUED | OUTPATIENT
Start: 2024-05-28 | End: 2024-05-30 | Stop reason: HOSPADM

## 2024-05-28 RX ORDER — ONDANSETRON 2 MG/ML
4 INJECTION INTRAMUSCULAR; INTRAVENOUS EVERY 6 HOURS PRN
Status: DISCONTINUED | OUTPATIENT
Start: 2024-05-28 | End: 2024-05-28

## 2024-05-28 RX ORDER — SODIUM CHLORIDE 0.9 % (FLUSH) 0.9 %
5-40 SYRINGE (ML) INJECTION EVERY 12 HOURS SCHEDULED
Status: DISCONTINUED | OUTPATIENT
Start: 2024-05-28 | End: 2024-05-28

## 2024-05-28 RX ADMIN — SODIUM CHLORIDE, SODIUM LACTATE, POTASSIUM CHLORIDE, AND CALCIUM CHLORIDE: .6; .31; .03; .02 INJECTION, SOLUTION INTRAVENOUS at 08:28

## 2024-05-28 RX ADMIN — SODIUM CHLORIDE, SODIUM LACTATE, POTASSIUM CHLORIDE, AND CALCIUM CHLORIDE: .6; .31; .03; .02 INJECTION, SOLUTION INTRAVENOUS at 07:56

## 2024-05-28 RX ADMIN — ACETAMINOPHEN 1000 MG: 500 TABLET ORAL at 21:16

## 2024-05-28 RX ADMIN — ONDANSETRON 4 MG: 2 INJECTION INTRAMUSCULAR; INTRAVENOUS at 14:56

## 2024-05-28 RX ADMIN — MORPHINE SULFATE 2 MG: 0.5 INJECTION EPIDURAL; INTRATHECAL; INTRAVENOUS at 08:32

## 2024-05-28 RX ADMIN — MIDAZOLAM HYDROCHLORIDE 2 MG: 5 INJECTION, SOLUTION INTRAMUSCULAR; INTRAVENOUS at 08:41

## 2024-05-28 RX ADMIN — SODIUM CHLORIDE, POTASSIUM CHLORIDE, SODIUM LACTATE AND CALCIUM CHLORIDE 1000 ML: 600; 310; 30; 20 INJECTION, SOLUTION INTRAVENOUS at 06:33

## 2024-05-28 RX ADMIN — MIDAZOLAM HYDROCHLORIDE 3 MG: 5 INJECTION, SOLUTION INTRAMUSCULAR; INTRAVENOUS at 08:39

## 2024-05-28 RX ADMIN — KETOROLAC TROMETHAMINE 30 MG: 30 INJECTION, SOLUTION INTRAMUSCULAR at 10:12

## 2024-05-28 RX ADMIN — OXYTOCIN 20 UNITS: 10 INJECTION, SOLUTION INTRAMUSCULAR; INTRAVENOUS at 08:29

## 2024-05-28 RX ADMIN — FENTANYL CITRATE 100 MCG: 50 INJECTION, SOLUTION INTRAMUSCULAR; INTRAVENOUS at 08:31

## 2024-05-28 RX ADMIN — BUPIVACAINE HYDROCHLORIDE 1.6 ML: 7.5 INJECTION, SOLUTION SUBARACHNOID at 07:58

## 2024-05-28 RX ADMIN — EPHEDRINE SULFATE 10 MG: 50 INJECTION INTRAVENOUS at 08:11

## 2024-05-28 RX ADMIN — EPHEDRINE SULFATE 10 MG: 50 INJECTION INTRAVENOUS at 08:23

## 2024-05-28 RX ADMIN — EPHEDRINE SULFATE 10 MG: 50 INJECTION INTRAVENOUS at 08:19

## 2024-05-28 RX ADMIN — Medication 87.3 MILLI-UNITS/MIN: at 12:08

## 2024-05-28 RX ADMIN — ONDANSETRON 4 MG: 2 INJECTION INTRAMUSCULAR; INTRAVENOUS at 21:16

## 2024-05-28 RX ADMIN — DOCUSATE SODIUM 100 MG: 100 CAPSULE, LIQUID FILLED ORAL at 21:16

## 2024-05-28 RX ADMIN — SODIUM CHLORIDE, SODIUM LACTATE, POTASSIUM CHLORIDE, AND CALCIUM CHLORIDE: .6; .31; .03; .02 INJECTION, SOLUTION INTRAVENOUS at 08:34

## 2024-05-28 RX ADMIN — MORPHINE SULFATE 0.15 MG: 0.5 INJECTION, SOLUTION EPIDURAL; INTRATHECAL; INTRAVENOUS at 07:58

## 2024-05-28 RX ADMIN — OXYTOCIN 20 UNITS: 10 INJECTION, SOLUTION INTRAMUSCULAR; INTRAVENOUS at 08:35

## 2024-05-28 RX ADMIN — FAMOTIDINE 20 MG: 10 INJECTION, SOLUTION INTRAVENOUS at 07:45

## 2024-05-28 RX ADMIN — Medication 10 ML: at 21:17

## 2024-05-28 RX ADMIN — METHYLERGONOVINE MALEATE 200 MCG: 0.2 INJECTION, SOLUTION INTRAMUSCULAR; INTRAVENOUS at 12:16

## 2024-05-28 RX ADMIN — EPHEDRINE SULFATE 10 MG: 50 INJECTION INTRAVENOUS at 08:15

## 2024-05-28 RX ADMIN — SODIUM CHLORIDE, POTASSIUM CHLORIDE, SODIUM LACTATE AND CALCIUM CHLORIDE: 600; 310; 30; 20 INJECTION, SOLUTION INTRAVENOUS at 12:09

## 2024-05-28 RX ADMIN — KETOROLAC TROMETHAMINE 30 MG: 30 INJECTION, SOLUTION INTRAMUSCULAR at 19:05

## 2024-05-28 RX ADMIN — MISOPROSTOL 800 MCG: 100 TABLET ORAL at 12:25

## 2024-05-28 RX ADMIN — ONDANSETRON 4 MG: 2 INJECTION INTRAMUSCULAR; INTRAVENOUS at 07:45

## 2024-05-28 RX ADMIN — MORPHINE SULFATE 1 MG: 0.5 INJECTION EPIDURAL; INTRATHECAL; INTRAVENOUS at 08:41

## 2024-05-28 RX ADMIN — SODIUM CHLORIDE, SODIUM LACTATE, POTASSIUM CHLORIDE, AND CALCIUM CHLORIDE: .6; .31; .03; .02 INJECTION, SOLUTION INTRAVENOUS at 07:45

## 2024-05-28 ASSESSMENT — PAIN - FUNCTIONAL ASSESSMENT: PAIN_FUNCTIONAL_ASSESSMENT: ACTIVITIES ARE NOT PREVENTED

## 2024-05-28 ASSESSMENT — PAIN DESCRIPTION - LOCATION: LOCATION: ABDOMEN

## 2024-05-28 ASSESSMENT — PAIN SCALES - GENERAL
PAINLEVEL_OUTOF10: 7
PAINLEVEL_OUTOF10: 0

## 2024-05-28 ASSESSMENT — PAIN DESCRIPTION - ORIENTATION: ORIENTATION: LOWER;MID

## 2024-05-28 ASSESSMENT — LIFESTYLE VARIABLES: SMOKING_STATUS: 1

## 2024-05-28 ASSESSMENT — PAIN DESCRIPTION - DESCRIPTORS: DESCRIPTORS: ACHING;SORE

## 2024-05-28 NOTE — ANESTHESIA PROCEDURE NOTES
Spinal Block    Patient location during procedure: OR  End time: 5/28/2024 7:58 AM  Reason for block: primary anesthetic  Staffing  Resident/CRNA: Edgardo Hussein APRN - CRNA  Other anesthesia staff: Lianna Neumann RN  Performed by: Edgardo Hussein APRN - CRNA  Authorized by: Marcell Cardoza MD    Spinal Block  Patient position: sitting  Prep: ChloraPrep  Patient monitoring: cardiac monitor, continuous pulse ox and frequent blood pressure checks  Approach: midline  Location: L3/L4  Provider prep: mask  Needle  Needle type: Pencan   Needle gauge: 24 G  Needle length: 4 in  Assessment  Sensory level: T4  Swirl obtained: Yes  CSF: clear  Attempts: 2  Hemodynamics: stable  Preanesthetic Checklist  Completed: patient identified, IV checked, site marked, risks and benefits discussed, surgical/procedural consents, equipment checked, pre-op evaluation, timeout performed, anesthesia consent given, oxygen available, monitors applied/VS acknowledged, fire risk safety assessment completed and verbalized and blood product R/B/A discussed and consented

## 2024-05-28 NOTE — PROGRESS NOTES
1154-Dr. Johnson called for total QBL of 980. Pt had two moderate size gushes of blood in recovery with a few small clots. Fundus has remained firm u/u. RN planning to hang second bag of pit at this time. Pt VSS. Asymptomatic to blood loss at this time. RN planning to do additional fundal assessment in 15 minutes.     1213-Pt continuing to have steady trickle of bleeding noted with fundal check even though fundus remains firm u/u. Dr. Alvarado notified and received order to administer IM methergine and rectal cytotec. See MAR.

## 2024-05-28 NOTE — L&D DELIVERY NOTE
Department of Obstetrics and Gynecology   Section Note    Date:24     Preoperative Diagnosis: 25 y/o  @ 39.5 wks. Gestation,   h/o  section, depression/anxiety, asthma, obesity, GBS positive     Post-operative Diagnosis: RLTCS, viable male infant    Surgeon: Dona Alvarado D.O.     Assistants: Surgery [facility]    Anesthesia: Spinal  QBL: 521 ml  Drains: 350 ml clear urine at end of procedure  Findings:  Viable Male Infant- 3520g (7 lbs.12.2 oz)   Apgars 8/9 @ 1 & 5 minutes respectively   Normal tubes and ovaries bilaterally.       Complications: none     Procedure Details   The patient was seen in the Holding Room. The risks, benefits, complications, treatment options, and expected outcomes were discussed with the patient.  The patient concurred with the proposed plan, giving informed consent.  The site of surgery properly noted/marked. The patient was taken to Operating Room , identified,  and the procedure verified as  Delivery. A Time Out was held and the above information confirmed.    After induction of anesthesia, the patient was draped and prepped in the usual sterile manner. A Pfannenstiel incision was made and carried down through the subcutaneous tissue to the fascia. Fascial incision was made and extended transversely. The fascia was  from the underlying rectus tissue superiorly and inferiorly. The peritoneum was identified and entered. Peritoneal incision was extended longitudinally. An SHARONDA retractor was placed without difficulty. The utero-vesical peritoneal reflection was incised transversely and the bladder flap was bluntly freed from the lower uterine segment. A low transverse uterine incision was made. An anterior placenta was noted. Delivered from vertex presentation was a male infant.. After the umbilical cord was clamped and cut cord , the placenta was removed intact and appeared normal. The uterine outline, tubes and ovaries appeared

## 2024-05-28 NOTE — H&P
Department of Obstetrics and Gynecology  Attending History and Physical       CHIEF COMPLAINT:  Here for elective Repeat Low Transverse  Section    HISTORY OF PRESENT ILLNESS:      The patient is a 24 y.o. y/o    @  39w5d weeks by P c/w 8.5 wk , final EDC 24 presents ro elective repeat low transverse  section. Pt denies LOF or VB. Reports occ. Contractions. +FM.     Preg c/b   Depression/PTSD, asthma, obesity, h/o LEEP 23, MJ usage, abnl early 1hr GCT w/ normal 3 hr GTT, GBS positive.      PNL  T&S - B positive  Rubella - Immune  RPR NR  UA Cx - Neg  HBsAg - Neg  HIV Neg  HCV Neg  GC/CT/Trich - Neg  1 hr GCT -124  RPR  - NR  GBS -Positive          Past Medical History:        Diagnosis Date    Abnormal Pap smear of cervix     ADHD (attention deficit hyperactivity disorder)     Anxiety     Asthma     Chlamydia infection 2019    Depression     Influenza B 2020    PTSD (post-traumatic stress disorder)     family issue     Past Surgical History:        Procedure Laterality Date     SECTION N/A 3/10/2020     SECTION performed by Pa Waters MD at [facility] L&D OR    LEEP N/A 2023    LOOP ELECTROSURGICAL EXCISIONAL PROCEDURE [place] Barrett [place] [facility] [place]    TUMOR REMOVAL  2019    left side of throat     WISDOM TOOTH EXTRACTION       Social History:    TOBACCO:   reports that she has been smoking cigarettes. She has never used smokeless tobacco.  ETOH:   reports current alcohol use of about 1.0 standard drink of alcohol per week.  DRUGS:   reports that she does not currently use drugs after having used the following drugs: Marijuana (Weed).  MARITAL STATUS:    Family History:       Adopted: Yes     Medications Prior to Admission:PNV    Allergies: nkda          PHYSICAL EXAM:  Vitals:    24 0619   BP: 103/62   Pulse: 99   Resp: 18   Temp: 98 °F (36.7 °C)   TempSrc: Oral   SpO2: 96%   Weight: 82.1 kg (181 lb)

## 2024-05-28 NOTE — ANESTHESIA PRE PROCEDURE
Department of Anesthesiology  Preprocedure Note       Name:  Ana Laura HEMPHILL [facility]   Age:  24 y.o.  :  2000                                          MRN:  [id number]         Date:  2024      Surgeon: Surgeon(s):  Dona Alvarado DO    Procedure: Procedure(s):   SECTION    Medications prior to admission:   Prior to Admission medications    Medication Sig Start Date End Date Taking? Authorizing Provider   albuterol sulfate HFA (PROVENTIL;VENTOLIN;PROAIR) 108 (90 Base) MCG/ACT inhaler 2 puffs    ProviderStoney MD   Prenatal Multivit-Min-Fe-FA (PRENATAL 1 + IRON PO) Take 2 Doses by mouth daily    ProviderStoney MD   loratadine (CLARITIN) 10 MG tablet Take 1 tablet by mouth daily 24   Stoney Cruz MD   FEROSUL 325 (65 Fe) MG tablet Take 1 tablet by mouth 2 times daily 24   Stoney Cruz MD       Current medications:    Current Facility-Administered Medications   Medication Dose Route Frequency Provider Last Rate Last Admin    lactated ringers IV soln infusion   IntraVENous Continuous Dona Alvarado DO   New Bag at 24 0834    sodium chloride flush 0.9 % injection 5-40 mL  5-40 mL IntraVENous 2 times per day Dona Alvarado DO        sodium chloride flush 0.9 % injection 10 mL  10 mL IntraVENous PRN Dona Alvarado DO        0.9 % sodium chloride infusion   IntraVENous PRN Dona Alvarado DO        citric acid-sodium citrate (BICITRA) solution 30 mL  30 mL Oral Once Dona Alvarado DO        famotidine (PEPCID) injection 20 mg  20 mg IntraVENous Once Dona Alvarado DO        metoclopramide (REGLAN) injection 10 mg  10 mg IntraVENous Once Dona Alvarado DO        acetaminophen (TYLENOL) tablet 975 mg  975 mg Oral Once Dona Alvarado DO        oxytocin (PITOCIN) 30 units in 500 mL infusion  87.3 amirah-units/min IntraVENous Continuous PRN Dona Alvarado DO        And    oxytocin (PITOCIN) 10 unit bolus from the bag  10 Units

## 2024-05-29 LAB
DEPRECATED RDW RBC AUTO: 14 % (ref 12.4–15.4)
HCT VFR BLD AUTO: 26.3 % (ref 36–48)
HGB BLD-MCNC: 9.1 G/DL (ref 12–16)
MCH RBC QN AUTO: 31 PG (ref 26–34)
MCHC RBC AUTO-ENTMCNC: 34.7 G/DL (ref 31–36)
MCV RBC AUTO: 89.5 FL (ref 80–100)
PLATELET # BLD AUTO: 166 K/UL (ref 135–450)
PMV BLD AUTO: 7.3 FL (ref 5–10.5)
RBC # BLD AUTO: 2.94 M/UL (ref 4–5.2)
WBC # BLD AUTO: 11.2 K/UL (ref 4–11)

## 2024-05-29 PROCEDURE — 85027 COMPLETE CBC AUTOMATED: CPT

## 2024-05-29 PROCEDURE — 6370000000 HC RX 637 (ALT 250 FOR IP): Performed by: OBSTETRICS & GYNECOLOGY

## 2024-05-29 PROCEDURE — 2580000003 HC RX 258: Performed by: OBSTETRICS & GYNECOLOGY

## 2024-05-29 PROCEDURE — 36415 COLL VENOUS BLD VENIPUNCTURE: CPT

## 2024-05-29 PROCEDURE — 1220000000 HC SEMI PRIVATE OB R&B

## 2024-05-29 RX ORDER — FERROUS SULFATE 325(65) MG
325 TABLET ORAL 2 TIMES DAILY WITH MEALS
Status: DISCONTINUED | OUTPATIENT
Start: 2024-05-29 | End: 2024-05-30 | Stop reason: HOSPADM

## 2024-05-29 RX ADMIN — ACETAMINOPHEN 1000 MG: 500 TABLET ORAL at 05:12

## 2024-05-29 RX ADMIN — Medication 10 ML: at 08:17

## 2024-05-29 RX ADMIN — OXYCODONE HYDROCHLORIDE 10 MG: 5 TABLET ORAL at 22:59

## 2024-05-29 RX ADMIN — IBUPROFEN 800 MG: 800 TABLET, FILM COATED ORAL at 14:24

## 2024-05-29 RX ADMIN — ACETAMINOPHEN 1000 MG: 500 TABLET ORAL at 19:51

## 2024-05-29 RX ADMIN — FERROUS SULFATE TAB 325 MG (65 MG ELEMENTAL FE) 325 MG: 325 (65 FE) TAB at 08:16

## 2024-05-29 RX ADMIN — DOCUSATE SODIUM 100 MG: 100 CAPSULE, LIQUID FILLED ORAL at 19:51

## 2024-05-29 RX ADMIN — PRENATAL WITH FERROUS FUM AND FOLIC ACID 1 TABLET: 3080; 920; 120; 400; 22; 1.84; 3; 20; 10; 1; 12; 200; 27; 25; 2 TABLET ORAL at 08:16

## 2024-05-29 RX ADMIN — ACETAMINOPHEN 1000 MG: 500 TABLET ORAL at 12:52

## 2024-05-29 RX ADMIN — FERROUS SULFATE TAB 325 MG (65 MG ELEMENTAL FE) 325 MG: 325 (65 FE) TAB at 18:31

## 2024-05-29 RX ADMIN — OXYCODONE HYDROCHLORIDE 5 MG: 5 TABLET ORAL at 11:56

## 2024-05-29 RX ADMIN — OXYCODONE HYDROCHLORIDE 10 MG: 5 TABLET ORAL at 16:03

## 2024-05-29 RX ADMIN — IBUPROFEN 800 MG: 800 TABLET, FILM COATED ORAL at 03:11

## 2024-05-29 RX ADMIN — DOCUSATE SODIUM 100 MG: 100 CAPSULE, LIQUID FILLED ORAL at 08:16

## 2024-05-29 ASSESSMENT — PAIN SCALES - GENERAL
PAINLEVEL_OUTOF10: 7
PAINLEVEL_OUTOF10: 6
PAINLEVEL_OUTOF10: 3
PAINLEVEL_OUTOF10: 7
PAINLEVEL_OUTOF10: 7

## 2024-05-29 ASSESSMENT — PAIN DESCRIPTION - LOCATION
LOCATION: ABDOMEN
LOCATION: ABDOMEN;VAGINA

## 2024-05-29 ASSESSMENT — PAIN DESCRIPTION - DESCRIPTORS
DESCRIPTORS: BURNING
DESCRIPTORS: BURNING
DESCRIPTORS: CRAMPING;BURNING
DESCRIPTORS: CRAMPING
DESCRIPTORS: ACHING

## 2024-05-29 ASSESSMENT — PAIN - FUNCTIONAL ASSESSMENT
PAIN_FUNCTIONAL_ASSESSMENT: ACTIVITIES ARE NOT PREVENTED

## 2024-05-29 ASSESSMENT — PAIN DESCRIPTION - ORIENTATION
ORIENTATION: LEFT
ORIENTATION: LEFT

## 2024-05-29 NOTE — PROGRESS NOTES
Assisted patient to bathroom for tri care and bed change. Patient ambulated well. Tri care taught and patient verbalized understanding.

## 2024-05-29 NOTE — ANESTHESIA POSTPROCEDURE EVALUATION
Department of Anesthesiology  Postprocedure Note    Patient: Ana Laura Barragan  MRN: [id number]  YOB: 2000  Date of evaluation: 2024    Procedure Summary       Date: 24 Room / Location: [facility] [address] [place] [address]    Anesthesia Start: 739 Anesthesia Stop: 921    Procedure:  SECTION (Abdomen) Diagnosis:       Previous  section      (Previous  section [Z98.891])    Surgeons: Dona Alvarado DO Responsible Provider: Marcell Cardoza MD    Anesthesia Type: Spinal ASA Status: 2 - Emergent            Anesthesia Type: Spinal    Rosa Maria Phase I: Rosa Maria Score: 9    Rosa Maria Phase II: Rosa Maria Score: 9    Anesthesia Post Evaluation    Patient location during evaluation: bedside  Patient participation: complete - patient participated  Level of consciousness: awake and alert  Airway patency: patent  Nausea & Vomiting: no nausea and no vomiting  Cardiovascular status: hemodynamically stable  Respiratory status: room air and spontaneous ventilation  Hydration status: stable  Multimodal analgesia pain management approach  Pain management: adequate and satisfactory to patient        No notable events documented.

## 2024-05-29 NOTE — PROGRESS NOTES
[facility] Attending Progress Note  POD#1 s/p scheduled elective RLTCD    mL    S: No complaints, tolerating po intake, pain controlled by meds, + ambulation, lochia decresing similar to menses, voiding without difficulty.     O: BP (!) 100/56   Pulse 80   Temp 98.3 °F (36.8 °C) (Oral)   Resp 18   Ht 1.524 m (5')   Wt 82.1 kg (181 lb)   LMP 08/24/2023   SpO2 99%   Breastfeeding Unknown   BMI 35.35 kg/m²   Abd - soft, fundus firm below umbilicus, incision c/d/i  Ext warm, well perfused    WBC/Hgb/Hct/Plts:  11.2/9.1/26.3/166 (05/29 0607)    A/P:   POD #1   Rh positive  Rubella immune    1. Recovering well  2. Meeting postpartum milestones  3. Cont routine pp care  4. Male infant - desires elective circumcision prior to discharge  5. Anticipate discharge home day 2-3    DO MANNY Ferguson

## 2024-05-30 VITALS
BODY MASS INDEX: 35.53 KG/M2 | TEMPERATURE: 97.7 F | OXYGEN SATURATION: 96 % | DIASTOLIC BLOOD PRESSURE: 55 MMHG | WEIGHT: 181 LBS | SYSTOLIC BLOOD PRESSURE: 103 MMHG | HEIGHT: 60 IN | RESPIRATION RATE: 16 BRPM | HEART RATE: 95 BPM

## 2024-05-30 PROCEDURE — 6370000000 HC RX 637 (ALT 250 FOR IP): Performed by: OBSTETRICS & GYNECOLOGY

## 2024-05-30 RX ORDER — FERROUS SULFATE 325(65) MG
325 TABLET ORAL
Qty: 30 TABLET | Refills: 0 | Status: SHIPPED | OUTPATIENT
Start: 2024-05-30 | End: 2024-06-29

## 2024-05-30 RX ORDER — IBUPROFEN 800 MG/1
800 TABLET ORAL EVERY 6 HOURS
Qty: 56 TABLET | Refills: 0 | Status: SHIPPED | OUTPATIENT
Start: 2024-05-30 | End: 2024-06-13

## 2024-05-30 RX ORDER — ACETAMINOPHEN 500 MG
1000 TABLET ORAL EVERY 6 HOURS
Qty: 112 TABLET | Refills: 0 | Status: SHIPPED | OUTPATIENT
Start: 2024-05-30 | End: 2024-06-13

## 2024-05-30 RX ORDER — OXYCODONE HYDROCHLORIDE 5 MG/1
5 TABLET ORAL EVERY 6 HOURS PRN
Qty: 15 TABLET | Refills: 0 | Status: SHIPPED | OUTPATIENT
Start: 2024-05-30 | End: 2024-06-06

## 2024-05-30 RX ORDER — PSEUDOEPHEDRINE HCL 30 MG
100 TABLET ORAL 2 TIMES DAILY PRN
Qty: 60 CAPSULE | Refills: 0 | Status: SHIPPED | OUTPATIENT
Start: 2024-05-30 | End: 2024-06-29

## 2024-05-30 RX ADMIN — IBUPROFEN 800 MG: 800 TABLET, FILM COATED ORAL at 04:26

## 2024-05-30 RX ADMIN — FERROUS SULFATE TAB 325 MG (65 MG ELEMENTAL FE) 325 MG: 325 (65 FE) TAB at 09:32

## 2024-05-30 RX ADMIN — ACETAMINOPHEN 1000 MG: 500 TABLET ORAL at 09:32

## 2024-05-30 RX ADMIN — OXYCODONE HYDROCHLORIDE 10 MG: 5 TABLET ORAL at 13:13

## 2024-05-30 RX ADMIN — IBUPROFEN 800 MG: 800 TABLET, FILM COATED ORAL at 13:13

## 2024-05-30 RX ADMIN — DOCUSATE SODIUM 100 MG: 100 CAPSULE, LIQUID FILLED ORAL at 09:32

## 2024-05-30 RX ADMIN — PRENATAL WITH FERROUS FUM AND FOLIC ACID 1 TABLET: 3080; 920; 120; 400; 22; 1.84; 3; 20; 10; 1; 12; 200; 27; 25; 2 TABLET ORAL at 09:32

## 2024-05-30 ASSESSMENT — PAIN SCALES - GENERAL
PAINLEVEL_OUTOF10: 3
PAINLEVEL_OUTOF10: 7
PAINLEVEL_OUTOF10: 6

## 2024-05-30 ASSESSMENT — PAIN DESCRIPTION - DESCRIPTORS
DESCRIPTORS: ACHING;SORE
DESCRIPTORS: PRESSURE;SORE

## 2024-05-30 ASSESSMENT — PAIN DESCRIPTION - LOCATION
LOCATION: ABDOMEN;INCISION
LOCATION: INCISION

## 2024-05-30 NOTE — PROGRESS NOTES
Discharge Phone Call    Patient Name: Ana Laura HEMPHILL [facility]     OB Care Provider: Dona Alvarado DO Discharge Date: 2024    Disposition of baby:    Phone Number: [phone] (home)     Attempts to Contact:  Date:    Caller  Date:    Caller  Date:    Caller    Information for the patient's :  Blas Barragan [[id number]]   Delivery Method: , Low Transverse     1.  Now that you are at home is your pain being well controlled?   Y/N   If no, instruct to call       provider.      2. Are you breastfeeding?    Y/N    Do you need any extra support from our lactation staff?      Y/N    If yes, provide number for lactation.  3. Have you made or already had your first appointment with the baby's doctor? Y/N   If no, do      you know when to schedule it?  Y/N    4. Have you scheduled your follow-up appointment?  Y/N  If no, do you know when to schedule       it?    Y/N   If no, they can find it on printed discharge instructions.  5. Did staff discuss safe sleep during your stay? Y/N   6. Did we explain things in a way you could understand?  Y/N  7. Were we respectful of your preferences for labor and birth and include you in the plan of       care?  Y/N  If no, please explain _______________________________________________  8. Is there anyone in particular you would like to mention who provided care for you? _______      _________________________________________________________________________     9. Were you given a Post-Birth Warning Signs handout?  Y/N  Do you have it somewhere      easily accessible?  Y/N  If no, please send them a copy and ask them to put it somewhere      easily found.  10. Have you been crying excessively, having anger or mood swings that feel out of control, or       feel like you can't cope with caring for yourself or baby? Y/N   If yes, they may be showing       signs of postpartum depression and should call provider. There is also a        depression test on page C5

## 2024-05-30 NOTE — DISCHARGE SUMMARY
Obstetric Discharge Summary    Admitting Diagnosis  IUP at 39.5w  History of  section  Depression  Asthma  Obesity  H/o LEEP  H/o Marijuana use  Abnormal O'Cifuentes antepartum test  GBS carrier    OB History          4    Para   2    Term   2            AB   2    Living   2         SAB   2    IAB        Ectopic        Molar        Multiple   0    Live Births   2                Reasons for Admission on 2024  6:00 AM  Previous  section [Z98.891]  H/O  section [Z98.891]  No comment available   Section (Repeat)    Prenatal Procedures  None    Intrapartum Procedures        Multiple birth?: No         Delivery: See Labor and Delivery Summary       Postpartum Procedures  None    Postpartum/Operative Complications        Data  Information for the patient's :  Blas Barragan [[id number]]   male   Birth Weight: 3.52 kg (7 lb 12.2 oz)   Discharge With Mother  Complications: No    Discharge Diagnosis   IUP at 39.5w  History of  section  Depression  Asthma  Obesity  H/o LEEP  H/o Marijuana use  Abnormal O'Cifuentes antepartum test  GBS carrier    Discharge Information  Current Discharge Medication List        START taking these medications    Details   oxyCODONE (ROXICODONE) 5 MG immediate release tablet Take 1 tablet by mouth every 6 hours as needed for Pain (uncontrolled with tylenol and ibuprofen) for up to 15 doses. Max Daily Amount: 20 mg  Qty: 15 tablet, Refills: 0    Comments: Reduce doses taken as pain becomes manageable  Associated Diagnoses: Post-operative pain      docusate sodium (COLACE, DULCOLAX) 100 MG CAPS Take 100 mg by mouth 2 times daily as needed for Constipation  Qty: 60 capsule, Refills: 0           CONTINUE these medications which have CHANGED    Details   acetaminophen (TYLENOL) 500 MG tablet Take 2 tablets by mouth every 6 hours for 14 days  Qty: 112 tablet, Refills: 0      ibuprofen (ADVIL;MOTRIN) 800 MG tablet Take 1

## 2024-05-30 NOTE — PROGRESS NOTES
Infant discharged to home with parents. Infant secured in car seat and secured in car by parent and grandparent. No distress noted.

## 2024-05-30 NOTE — LACTATION NOTE
This note was copied from a baby's chart.  LACTATION CONSULTATION      Follow-up Consult: Reason for Follow-up during lactation rounds to evaluate how feedings are going since frenotomy this AM and to review breastfeeding education.        Name: Blas Barragan       MRN: [id number]               YOB: 2026   Time of Birth: 8:28 AM   Gestational age: Gestational Age: 39w5d   Birth Weight: Birth Weight: 3.52 kg (7 lb 12.2 oz) Most Recent Weight: Weight: 3.456 kg (7 lb 9.9 oz)   Weight Change from Birth: -2%            Maternal Assessment:      Maternal Data:   Information for the patient's mother:  Ana Laura Barragan [[id number]]   26 y.o.    /Para:   Information for the patient's mother:  Ana Laura Barragan [[id number]]        Information for the patient's mother:  Ana Laura Barragan [[id number]]   39w5d          Breast Assessment  Right Breast: WDL and Large  Right Nipple: Everts well   Right Areola: WDL   Right Nipple Comfort: 1st minute or less of breastfeeding  and Pain improved with position and latch assistance  Right Nipple Integrity: Cracked  and Red     Left Breast: WDL and Large  Left Nipple: Everts well   Left Areola: WDL   Left Nipple Comfort:  Sore  Left Nipple Integrity: Cracked  and Red      Infant Assessment:    DOL:27 hours of life      Feeding: Breastfeeding  (Gave a 1x supplement overnight)     Use of Supplementation: Non- medical  due to: Mother gave a 1x supplement overnight, d/t Poor Feeding  and Latch Difficulties , infant was disorganized with tongue tie noted by LC,   Type of Supplementation: Similac Total Care 360   Method of Supplementation: Syringe    I&O adequacy:  Urine output: is established  Stool output: is established  Percent weight change from birthweight: -2%     Oral Assessment:   Palate:intact   Frenulum:Yes; frenotomy performed   Frenotomy Performed: 24       Intervention during consultation:     Interventions Performed:   Assisted with 
This note was copied from a baby's chart.  LACTATION CONSULTATION      Follow-up Consult: Reason for Follow-up patient request, latch assist. Discharge education and teaching.         Name: Blas Barragan       MRN: [id number]               YOB: 2026   Time of Birth: 8:28 AM   Gestational age: Gestational Age: 39w5d   Birth Weight: Birth Weight: 3.52 kg (7 lb 12.2 oz) Most Recent Weight: Weight: 3.304 kg (7 lb 4.5 oz)   Weight Change from Birth: -6%            Maternal Assessment:      Maternal Data:   Information for the patient's mother:  Ana Laura Barragan [[id number]]   26 y.o.    /Para:   Information for the patient's mother:  Ana Laura Barragan [[id number]]        Information for the patient's mother:  Ana Laura Barragan [[id number]]   39w5d          Breast Assessment  Right Breast: WDL and Large  Right Nipple: Everts well  and Short  Right Areola: WDL   Right Nipple Comfort: pain scale: 1/10  Right Nipple Integrity: Intact    Left Breast: WDL and Large  Left Nipple: Everts well  and Short  Left Areola: WDL   Left Nipple Comfort: pain scale: 1/10  Left Nipple Integrity: Intact     Infant Assessment:    DOL:Infant DOL 2      Feeding: Breastfeeding with supplement  and mob states small volume to assist and encourage latch.      Use of Supplementation: Non- medical  due to: Latch Difficulties   Type of Supplementation: Similac Total Care 360   Method of Supplementation: Syringe  Alternative feeding method offered:       Nipple Shield in Use: Yes- was able to latch infant to breast with this feeding without nipple shield use  Nipple Shield Size: Small 20mm      I&O adequacy:  Urine output: is established  Stool output: is established  Percent weight change from birthweight: -6%     Oral Assessment: s/p frenotomy 24  Infant latched at breast at this consult. Did not evaluate mouth     Birth Factors/Diagnosis that could create risk for breastfeeding:   Non Medical 
This note was copied from a baby's chart.  LACTATION CONSULTATION      Follow-up Consult: Reason for Follow-up: RN request latch assist, education       Name: Blas Barragan       MRN: [id number]               YOB: 2026   Time of Birth: 8:28 AM   Gestational age: Gestational Age: 39w5d   Birth Weight: Birth Weight: 3.52 kg (7 lb 12.2 oz) Most Recent Weight: Weight: 3.456 kg (7 lb 9.9 oz)   Weight Change from Birth: -2%            Maternal Assessment:      Maternal Data:   Information for the patient's mother:  Ana Laura Barragan [[id number]]   26 y.o.    /Para:   Information for the patient's mother:  Ana Laura Barragan [[id number]]        Information for the patient's mother:  Ana Laura Barragan [[id number]]   39w5d       Infant Assessment:    DOL:42 hours       Feeding: Breastfeeding , Breastfeeding with supplement , and Pumping      Use of Supplementation: Medical due to: Poor Feeding  and Latch Difficulties   Type of Supplementation: Similac Total Care 360   Method of Supplementation: SNS finger fed   Alternative feeding method offered: offered support and assist       Nipple Shield in Use: Yes  Nipple Shield Size: Small 20mm      I&O adequacy:  Urine output: is established  Stool output: is established  Percent weight change from birthweight: -2%     Intervention during consultation:     Interventions Performed:   Assisted with breastfeeding , Hand expression, Hydrogel pads, SNS provided and instructed on use , and Education     Latch & Positioning: RN called for assist with latching infant. Assisted MOB to position infant to the right breast in a cross cradle hold using 20mm nipple shield primed with 1ml of Similac and rolled blanket under breast for support. With previous techniques infant able to latch with on and off short suck bursts and intermittent long jaw motions with swallows. Infant on and off disorganized with suck and would get fussy however when calmed suck 
This note was copied from a baby's chart.  LACTATION CONSULTATION      Follow-up Consult: Reason for Follow-up: assess needs, education        Name: Blas Barragan       MRN: [id number]               YOB: 2026   Time of Birth: 8:28 AM   Gestational age: Gestational Age: 39w5d   Birth Weight: Birth Weight: 3.52 kg (7 lb 12.2 oz) Most Recent Weight: Weight: 3.456 kg (7 lb 9.9 oz)   Weight Change from Birth: -2%            Maternal Assessment:      Maternal Data:   Information for the patient's mother:  Ana Laura Barragan [[id number]]   26 y.o.    /Para:   Information for the patient's mother:  Ana Laura Barragan [[id number]]        Information for the patient's mother:  Ana Laura Barragan [[id number]]   39w5d          Breast Assessment  Right Breast: Breasts not assessed this encounter     Left Breast: Breasts not assessed this encounter      Infant Assessment:    DOL:36 hours       Feeding: Breastfeeding      I&O adequacy:  Urine output: is established  Stool output: is established  Percent weight change from birthweight: -2%     Oral Assessment:   Oral assessment not completed at this time.      Birth Factors/Diagnosis that could create risk for breastfeeding:     Glucose: No     Intervention during consultation:     Interventions Performed:   Education     Latch & Positioning: Encouraged to call for assist. Name and number on white board.     Manual Expression:  Not addressed at this time     Bedside Breast Pump:   Symphony     Breast Shield Size:   24mm    Amount of milk expressed:   N/A    Pump Arrangements:  Faxed to: [employer]    Pump Distributed: Yes and Derek DEL ROSARIO     Education:  Latch & positioning , Feeding frequency & feeding cues , and encouraged to call for lactation support.           Action/Plan:  Breastfeed on cue and at least 8 times/24 hours, unlimited timing. May not nurse this often in the first 24 hours. Wake baby and offer breastfeeding if it has been 3 hours 
This note was copied from a baby's chart.  LACTATION CONSULTATION  Initial Lactation Consult  Referred by: RN request    Name: Blas Barragan  MRN: [id number]  YOB: 2026 Time of Birth: 8:28 AM Gestational age: Gestational Age: 39w5d   Birth Weight: Birth Weight: 3.52 kg (7 lb 12.2 oz) Most Recent Weight: Weight: 3.52 kg (7 lb 12.2 oz) (Filed from Delivery Summary) Weight Change from Birth: 0%    Maternal Assessment:  Maternal Data:  Information for the patient's mother:  Ana Laura Barragan [[id number]]   26 y.o.   /Para:   Information for the patient's mother:  Ana Laura Barragan [[id number]]      Information for the patient's mother:  Ana Laura Barragan [[id number]]   39w5d     Prenatal Breastfeeding Education: Self Educations  and Prior Success in Breastfeeding     Prior Breastfeeding Experience:  other Children  and  for: 6 months, but reports struggled with latch.    Breastfeeding Goal: Exclusively Breastfeed     Breast Assessment  Right Breast: WDL and Large  Right Nipple: Everts well  and Short  Right Areola: small bruise noted above nipple around 1100 position. Reports infant latching in wrong place for few suck burst before readjustment after delivery  Right Nipple Comfort:  Did not observe feeding, but reports latch was comfortable with previous attempt.  Right Nipple Integrity: Intact    Left Breast: WDL and Large  Left Nipple: Everts well  and Short  Left Areola: WDL   Left Nipple Comfort:  did not observe feeding  Left Nipple Integrity: Intact    Medications of Concern:None    Maternal Toxicology:   Information for the patient's mother:  Ana Laura Barragan [[id number]]     Barbiturate Screen, Ur   Date Value Ref Range Status   2024 Neg Negative <200 ng/mL Final     Benzodiazepine Screen, Urine   Date Value Ref Range Status   2024 Neg Negative <200 ng/mL Final     Cannabinoid Scrn, Ur   Date Value Ref Range Status   2024 Neg Negative 
This note was copied from a baby's chart.  LC at bedside to provide f/u lactation consultation with multip breastfeeder, whose baby had frenotomy this AM.     Mother was tearful on consult and c/o incisional pain/burning. Offered to call RN to medicate, mother agreed. RN called and requested to come to bedside to medicate.  
This note was copied from a baby's chart.  Lactation Progress Note      Data:    F/U consult for multip on DOS with an infant born at 39.5 weeks gestation. MOB breast fed her first for 6 months but states it was a struggle.  MOB reports current infant has not been feeding well and has been unable to maintain latch & her nipples are already sore & red & she is really worried infant is not getting fed enough. Infant noted to have tongue tie. Output established.         Action:    Upon entry to room MOB states they have been practicing and things seem to be improving, infant is not coming off the breast when feeding as much like before. MOB was able to position & latch infant independently at left breast in cradle position. Latch was shallow, showed mother how to break suction & re-latch more deeply in cross cradle then move to cradle position.     This time infant was able to maintain latch & SRS noted. MOB states if feels much more comfortable now. Infant fed for 13 minutes only coming off the breast 1-2 times. Huge improvement from last consult.     Advised mother that if infant continues to breastfeed like this, she should skip pumping & supplementing. Praised mother for great improvement from earlier consult. All questions answered & MOB encouraged to call for LC assistance as needed.      Response:    MOB pleased with infant improvement, verbalized an understanding of education provided and will call for assistance as needed.   
This note was copied from a baby's chart.  Lactation Progress Note      Data:    F/U consult for multip on DOS with an infant born at 39.5 weeks gestation. MOB breast fed her first for 6 months but states she struggled. Reports current infant has been latching well but keeps coming off the breast. Output not established yet.     Action:    Introduced self to patient as lactation, name and phone number written on white board in room. Reviewed breastfeeding education with mother; what to expect with infant feedings, infant output & how to know infant is getting enough, how to break suction and try again if latch is shallow, normal  behavior, how to wake a sleepy infant to feed, how the breasts work to make milk, protecting milk supply, what to expect with cluster feeding, and educated & demonstrated about how to hand express colostrum.     Reviewed infant feeding cues and encouraged mother to allow infant to breast feed on demand anytime feeding cues are shown and if no feeding cues are shown to attempt to wake infant to feed every 2-3 hours. If infant is still too sleepy to latch to hand express colostrum into infants mouth for about ten minutes, then try again in 2-3 hours. After the first day of life to breast feed a minimum of 8-12 times a day per 24 hour period.     Encouraged good hydration, nutrition, and rest, and to keep taking prenatal or multivitamin while lactating. All questions answered & provided mother lanolin per her request. Mother encouraged to call lactation for F/U care as needed.     Response:    MOB verbalized an understanding of education provided and will call for assistance as needed.  
This note was copied from a baby's chart.  Lactation Progress Note      Data:    Follow up consult for multip on DOS with an infant born at 39.5 weeks gestation. MOB breast fed her first for 6 months but states it was a struggle. MOB reports current infant has not been feeding well and has been unable to maintain latch & her nipples are already sore & red & she is really worried infant is not getting fed enough.     RN calling for consult to try to help infant latch. At time of consult mother had infant at right breast in cradle position, on & off, no latch maintained.    Action:    Introduced self to patient as lactation, name and phone number written on white board in room. Educated mother about how to know if infant is getting enough at the breast & educated about soothing sore nipples & preventing further soreness with a CHELITA every time.     Educated mother about cross cradle & football positions, how to support infants head, how to support the breast, and steps for a CHELITA. Assisted mother position infant in football position at right breast & guided hands for a CHELITA, infant did 1-2 sucks then came off the breast. Tried several times with same result. Tried digital suck training & infant unable to suck effectively, chompy & tongue thrusting. MOB worried, states this is what he has been doing.     Checked infants mouth, tongue tie noted. Educated mother about tongue ties and suggested she speak with ped tomorrow to see if frenotomy is warranted.     Offered to help mother hand express or set her up with a breast pump. MOB desires to be set up with breast pump. Educated mother about how to use, what to expect, how often to use (q 2-3 hours, both breasts in Preemie Plus / Initiate mode), how to remove flanges w/o loosing any colostrum, when to expect mature breast milk production to begin, how to clean breast pump parts, how the breasts work to make milk, and protecting milk supply.     Advised mother that the goal of 
This note was copied from a baby's chart.  Mother up to the bathroom independently. LC remained at bedside to assist with breastfeeding when mother was finished per maternal request.   While waiting, mother called out requesting for help changing her tri pads, upon walking in room, mother states she felt she had passed numerous large clots. Assisted mother with changing her tri pad, lemon size clot noted in toilet, along with another strawberry sized clot. Jaquelin Cheng RN and Garima Diaz RN notified of large clots passed while up to the bathroom and called to bedside to assess bleeding.   
Pump:   Symphony     Breast Shield Size:   24mm    Amount of milk expressed:   N/A    Pump Arrangements:  Faxed to: [employer] and Breast pump requested: Zomee Z2    Pump Distributed: Zomee Z2     Education:  Electric breast pump  and Hand expression           Action/Plan:  Breastfeed on cue and at least 8 times/24 hours, unlimited timing. May not nurse this often in the first 24 hours. Wake baby and offer breastfeeding if it has been 3 hours since the beginning of last feeding. Place infant skin to skin if infant will not breastfeed at 3 hours.   Hand express prior to latch to adan nipple and entice infant to the breast.   It is important to use gentle stimulation during feeding to promote active eating. Offer both breasts at every feeding. Burp infant in between sides. Alternate which breast is used first.   Offer STS often while awake. Mother holding infant skin to skin between feedings will promote milk supply and allow infant to rest more deeply.   Maintain a feeding log until infant is gaining weight and seen by primary care physician.   Request breastfeeding assistance from LC or RN as needed.     Feeding Plan reviewed with: Mom    Response:   Verbalized understanding of education and instruction    
and Education     Latch & Positioning: RN called for assist with latching infant due to difficulty. Upon entry to room infant fussy. Assisted to position infant to the right breast in a football hold. Rolled blanket placed under breast for additional support. Reviewed position and hold. Instructed on the importance of maternal comfort and infant alignment to the breast for best latch. Reviewed hand expression prior to hold, nipple to nose, compression on the breast and bringing infant up and onto the breast. Multiple attempts to latch however infant unable to maintain. Infant remained fussy. Needed to calm with gloved finger. ESTEPHANIA Desai obtained 20mm nipple shield. Reviewed application and use. Reviewed hand expression prior to applying nipple shield. Once applied infant able to latch for few suck bursts however right nipple more tender for MOB so needed to remove nipple shield and attempted latch again. Infant able to maintain latch with active feeding for several minutes. Infant then removed from the right breast and then changed to the left breast in a cross cradle hold. Rolled blanket placed under left breast. Reviewed position and hold. Infant again with multiple attempts to latch and multiple drops expressed to lips yet latch not maintained. 20mm nipple shield applied again to the left breast. Once applied ~1ml of similac placed behind nipple shield. Infant able to latch well with short coordinated suck bursts, infant then calmed. Another 1ml placed behind nipple shield and again able to maintain latch. Infant with active feeding for about 20 minutes total combined between both breasts. Infant swaddled and in crib so MOB could rest. Encouraged to call for continued assist. Name and number on on white board.     Manual Expression:  Expresses easily, Drops obtained, MOB demonstrates well , and MOB states she understands     Bedside Breast Pump:   Symphony     Breast Shield Size:   N/A    Amount of milk expressed:

## 2024-05-30 NOTE — PLAN OF CARE
Problem: Postpartum  Goal: Experiences normal postpartum course  Description:  Postpartum OB-Pregnancy care plan goal which identifies if the mother is experiencing a normal postpartum course  Outcome: Progressing  Goal: Appropriate maternal -  bonding  Description:  Postpartum OB-Pregnancy care plan goal which identifies if the mother and  are bonding appropriately  Outcome: Progressing  Goal: Establishment of infant feeding pattern  Description:  Postpartum OB-Pregnancy care plan goal which identifies if the mother is establishing a feeding pattern with their   Outcome: Progressing  Goal: Incisions, wounds, or drain sites healing without S/S of infection  Outcome: Progressing     Problem: Pain  Goal: Verbalizes/displays adequate comfort level or baseline comfort level  Outcome: Progressing     Problem: Infection - Adult  Goal: Absence of infection at discharge  Outcome: Progressing  Goal: Absence of infection during hospitalization  Outcome: Progressing     Problem: Safety - Adult  Goal: Free from fall injury  Outcome: Progressing     Problem: Discharge Planning  Goal: Discharge to home or other facility with appropriate resources  Outcome: Progressing     Problem: Chronic Conditions and Co-morbidities  Goal: Patient's chronic conditions and co-morbidity symptoms are monitored and maintained or improved  Outcome: Progressing

## 2024-05-30 NOTE — PLAN OF CARE
Problem: Postpartum  Goal: Experiences normal postpartum course  Description:  Postpartum OB-Pregnancy care plan goal which identifies if the mother is experiencing a normal postpartum course  Outcome: Completed  Goal: Appropriate maternal -  bonding  Description:  Postpartum OB-Pregnancy care plan goal which identifies if the mother and  are bonding appropriately  Outcome: Completed  Goal: Establishment of infant feeding pattern  Description:  Postpartum OB-Pregnancy care plan goal which identifies if the mother is establishing a feeding pattern with their   Outcome: Completed  Goal: Incisions, wounds, or drain sites healing without S/S of infection  Outcome: Completed     Problem: Pain  Goal: Verbalizes/displays adequate comfort level or baseline comfort level  Outcome: Completed     Problem: Infection - Adult  Goal: Absence of infection at discharge  Outcome: Completed  Goal: Absence of infection during hospitalization  Outcome: Completed  Goal: Absence of fever/infection during anticipated neutropenic period  Outcome: Completed     Problem: Safety - Adult  Goal: Free from fall injury  Outcome: Completed     Problem: Discharge Planning  Goal: Discharge to home or other facility with appropriate resources  Outcome: Completed     Problem: Chronic Conditions and Co-morbidities  Goal: Patient's chronic conditions and co-morbidity symptoms are monitored and maintained or improved  Outcome: Completed

## 2024-05-30 NOTE — DISCHARGE INSTRUCTIONS
minutes at a time for comfort.  Do not express breast milk.  Avoid stimulation to your breasts. When showering, allow the water to strike only your back.  Wear a good fitting bra, such as a sports bra, until your milk dries up.     OTHER REASONS TO CALL YOUR DOCTOR    Your abdomen is tender to touch or you have pain that cannot be relieved.  Flu-like symptoms such as achy muscles and joints or you are experiencing extreme weakness or dizziness.  Persistent burning or increased urgency in urination.      LITERATURE PROVIDED    For Moms and Those who Care about Them.  Your [employer] Healthy Start, Grow Smart.  Breastfeeding Best for Baby, Best for Mom.  [facility] Parent Information about Universal Hearing Screening.  Controlling Pain.    I have received the educational material listed above,  [employer] has provided me with the opportunity to view instructional videos pertaining to infant care and the care of myself.  I verify that I have received the above information and have no further questions and feel confident to care for myself and my baby.    For more information about postpartum care or baby care and feeding, create a [facility] My Chart account, sign in and search using the magnifying glass and type in postpartum, breastfeeding or formula feeding. [email or web address]

## 2024-05-30 NOTE — PROGRESS NOTES
Pt discharged to home with infant. Pt taken to car with infant. No distress noted. ID bands matched and security band removed

## 2024-05-30 NOTE — PROGRESS NOTES
Discharge Phone Call    Patient Name: Ana Laura HEMPHILL [facility]     OB Care Provider: Dona Alvarado DO Discharge Date: 2024    Disposition of baby:    Phone Number: [phone] (home)     Attempts to Contact:  Date:    Caller  Date:    Caller  Date:    Caller    Information for the patient's :  Blas Barragan [[id number]]   Delivery Method: , Low Transverse     1.  Now that you are at home is your pain being well controlled?   Y/N   If no, instruct to call       provider.      2. Are you breastfeeding?    Y/N    Do you need any extra support from our lactation staff?      Y/N    If yes, provide number for lactation.  3. Have you made or already had your first appointment with the baby's doctor? Y/N   If no, do      you know when to schedule it?  Y/N    4. Have you scheduled your follow-up appointment?  Y/N  If no, do you know when to schedule       it?    Y/N   If no, they can find it on printed discharge instructions.  5. Did staff discuss safe sleep during your stay? Y/N   6. Did we explain things in a way you could understand?  Y/N  7. Were we respectful of your preferences for labor and birth and include you in the plan of       care?  Y/N  If no, please explain _______________________________________________  8. Is there anyone in particular you would like to mention who provided care for you? _______      _________________________________________________________________________     9. Were you given a Post-Birth Warning Signs handout?  Y/N  Do you have it somewhere      easily accessible?  Y/N  If no, please send them a copy and ask them to put it somewhere      easily found.  10. Have you been crying excessively, having anger or mood swings that feel out of control, or       feel like you can't cope with caring for yourself or baby? Y/N   If yes, they may be showing       signs of postpartum depression and should call provider. There is also a        depression test on page C5

## (undated) DEVICE — DRESSING COMP IS W4XL10IN PD W2XL8IN CNTCT LAYR ADH

## (undated) DEVICE — Z INACTIVE USE 2863041 SPONGE GZ W4XL4IN 100% COT 16 PLY RADPQ HIGHLY ABSRB

## (undated) DEVICE — SUTURE VICRYL SZ 0 L36IN ABSRB UD L48MM CTX 1/2 CIR J978H

## (undated) DEVICE — THERMOGARD PLUS ABC PEDIATRIC DUAL DISPERSIVE ELECTRODE: Brand: THERMOGARD PLUS ABC

## (undated) DEVICE — PAD,NON-ADHERENT,3X8,STERILE,LF,1/PK: Brand: MEDLINE

## (undated) DEVICE — SUTURE VCRL SZ 4-0 L27IN ABSRB UD L60MM KS STR REV CUT NDL J662H

## (undated) DEVICE — PACK,LAPAROSCOPY,PK II,SIRUS: Brand: MEDLINE

## (undated) DEVICE — PERI-PAD,MODERATE: Brand: CURITY

## (undated) DEVICE — COVER LT HNDL PLAS RIG 1 PER PK

## (undated) DEVICE — UNDERPAD INCONT 2XL FOR 38-58IN WAIST MESH PROTCT + DISP

## (undated) DEVICE — TRAY URIN CATH 16FR DRNGE BG STATLOK STBL DEV F SURSTP

## (undated) DEVICE — GARMENT COMPR L FOR 23IN CALF FLOTRN

## (undated) DEVICE — CIRCUIT ANES L72IN 3L BACT AND VIR FLTR EL CONN SGL LIMB

## (undated) DEVICE — CHLORAPREP 26ML ORANGE

## (undated) DEVICE — SURGICAL CAUTERY - FINE TIP/ADJUST-A-TEMP*: Brand: CARDINAL HEALTH

## (undated) DEVICE — SUTURE ABSORBABLE BRAIDED 2-0 CT-1 27 IN UD VICRYL J259H

## (undated) DEVICE — SUTURE MCRYL SZ 0 L36IN ABSRB VLT CT L40MM 1/2 CIR TAPR PNT Y358H

## (undated) DEVICE — CONTAINER,SPECIMEN,OR STERILE,4OZ: Brand: MEDLINE

## (undated) DEVICE — SOLUTION IV IRRIG POUR BRL 0.9% SODIUM CHL 2F7124

## (undated) DEVICE — LARGE, DISPOSABLE ALEXIS O C-SECTION PROTECTOR - RETRACTOR: Brand: ALEXIS ® O C-SECTION PROTECTOR - RETRACTOR

## (undated) DEVICE — LIGHT HANDLE: Brand: DEVON

## (undated) DEVICE — SUTURE VCRL SZ 0 L36IN ABSRB UD L36MM CT-1 1/2 CIR J946H

## (undated) DEVICE — AGENT HEMSTAT W2XL4IN OXIDIZED REGENERATED CELOS ABSRB

## (undated) DEVICE — CATHETER,URETHRAL,REDRUBBER,STRL,14FR: Brand: MEDLINE INDUSTRIES, INC.

## (undated) DEVICE — SYRINGE MED 10ML LUERLOCK TIP W/O SFTY DISP

## (undated) DEVICE — SUTURE PERMAHAND SZ 2-0 L18IN NONABSORBABLE BLK L26MM SH C012D

## (undated) DEVICE — TUBING, SUCTION, 3/16" X 10', STRAIGHT: Brand: MEDLINE

## (undated) DEVICE — SUTURE VICRYL SZ 0 L36IN ABSRB UD L36MM CT-1 1/2 CIR J946H

## (undated) DEVICE — SOLUTION IV IRRIG 500ML 0.9% SODIUM CHL 2F7123

## (undated) DEVICE — TUBING SET SGL PT TBNG OD1/2IN 3/8INX6FT

## (undated) DEVICE — S/USE RESUS KIT W/O MASK (10): Brand: FISHER & PAYKEL HEALTHCARE

## (undated) DEVICE — GLOVE ORANGE PI 7 1/2   MSG9075

## (undated) DEVICE — SUTURE VCRL SZ 3-0 L36IN ABSRB UD L36MM CT-1 1/2 CIR J944H

## (undated) DEVICE — 3M™ STERI-STRIP™ COMPOUND BENZOIN TINCTURE 40 BAGS/CARTON 4 CARTONS/CASE C1544: Brand: 3M™ STERI-STRIP™

## (undated) DEVICE — Device

## (undated) DEVICE — GLOVE SURG SZ 6 THK91MIL LTX FREE SYN POLYISOPRENE ANTI

## (undated) DEVICE — TELFA NON-ADHERENT CLEAR WOUND DRESSING: Brand: TELFA

## (undated) DEVICE — Z INACTIVE NO ACTIVE SUPPLIER APPLICATOR MEDICATED 26 CC TINT HI-LITE ORNG STRL CHLORAPREP

## (undated) DEVICE — GLOVE SURG SZ 55 THK91MIL ORANGE  LTX FREE SYN POLYISOPRENE

## (undated) DEVICE — DRAPE,UNDERBUTTOCKS,PCH,STERILE: Brand: MEDLINE

## (undated) DEVICE — SUTURE VICRYL SZ 3-0 L36IN ABSRB UD L36MM CT-1 1/2 CIR J944H

## (undated) DEVICE — SUTURE VICRYL SZ 0 L36IN ABSRB UD CT-1 L36MM 1/2 CIR TAPR PNT VCP946H